# Patient Record
Sex: FEMALE | Race: WHITE | Employment: FULL TIME | URBAN - METROPOLITAN AREA
[De-identification: names, ages, dates, MRNs, and addresses within clinical notes are randomized per-mention and may not be internally consistent; named-entity substitution may affect disease eponyms.]

---

## 2017-01-16 ENCOUNTER — OFFICE VISIT (OUTPATIENT)
Dept: ENDOCRINOLOGY | Age: 52
End: 2017-01-16

## 2017-01-16 VITALS
HEIGHT: 70 IN | OXYGEN SATURATION: 97 % | DIASTOLIC BLOOD PRESSURE: 60 MMHG | SYSTOLIC BLOOD PRESSURE: 101 MMHG | BODY MASS INDEX: 23.91 KG/M2 | HEART RATE: 83 BPM | RESPIRATION RATE: 16 BRPM | WEIGHT: 167 LBS

## 2017-01-16 DIAGNOSIS — E03.4 HYPOTHYROIDISM DUE TO ACQUIRED ATROPHY OF THYROID: Primary | ICD-10-CM

## 2017-01-16 DIAGNOSIS — R73.03 PREDIABETES: ICD-10-CM

## 2017-01-16 NOTE — PROGRESS NOTES
Endocrinology Visit    CC: hypothyroidism    History of present illness:  Patient is a pleasant 46 y.o. female here for f/u of hypothyroidism and prediabetes. She was previously followed by endocrinologist Dr Jona Lee, last visit was in August 2016. In the interim, she switched from Murray 60mg daily to Levoxyl 88 mcg daily at the end of September. She also reports developing hot flashes over the holidays. Labs done by her PCP last week were notable for [facility] of 49.9, LH of 21.2, estrogen of 73.5, prolactin of 14.5, CMP all within normal limits, and negative FARTUN screen. Reports a recent diagnosis of papillary thyroid cancer in her brother. She has not had a formal thyroid US although other radiology techs have practiced on her and said she had nodules. She was diagnosed with hypothyroidism over 15 years ago. She was initially treated with levothyroxine but later switched to Murray thyroid because it was more natural. She currently takes brand name Levoxyl (levothyroxine) 88 mcg daily. She  takes this correctly on an empty stomach in the morning. Since switching back to levothyroxine she reports feeling much better - hair loss seems to have resolved and her energy level is better. She lost about 10 lbs last year, was down to 157 lbs, then regained some weight over the holidays. She denies racing heart, tremor, palpitations, heat or cold intolerance, constipation, diarrhea or energy changes. Last thyroid labs were done in March 2016: TSH was 1.7. Levels have not been checked since she switched thyroid medications.      She is s/p DIANNA. Reports hot flashes that started two months ago, worse at night. Improved after starting black cohosh. Tried estrogen cream and vaginal pill but was unable to tolerate both. Previous evaluations were not consistent with menopause - [facility] checked in August 2016 was nonelevated, however most recent [facility] as noted above was elevated in the menopause range.  She also has a history of one mildly elevated prolactin level of 28 but repeat level checked by Dr Reva Thorpe was normal at 6.     Weight is increased as above. She also reports more swelling in lower legs and feet, worse after standing at work all day. Somewhat improved with compression socks. She also has a history of prediabetes. Most recent labs showed A1c at 5.6% in July 2016. She does have a family history of diabetes. She has tried to reduce her bread intake. Only drinks water, eliminated sodas. Feels she ate too many sweets over the holidays. ROS: see HPI for pertinent positives and negatives, otherwise a 10 system review is negative    Problem list:  Patient Active Problem List   Diagnosis Code    Hypothyroidism E03.9    Allergic rhinitis J30.9    Microscopic hematuria R31.29    Dyslipidemia E78.5    Dysuria R30.0       Medical history:  Past Medical History   Diagnosis Date    Asthma      hx of asthma r/t allergies/emotional response    Hx of mammogram 10/2/12    Nephrolithiasis      right kidney stone    Other ill-defined conditions(799.89)      echocardiogram,12/2010,  has murmur       Past surgical history:  Past Surgical History   Procedure Laterality Date    Pr abdomen surgery proc unlisted       laparoscopy to check for endometriosis    Hx hysterectomy  2007     laparoscopic hysterecomy, rectocele repair, umbilical hernia repair, sling    Hx bunionectomy  2001     ,right bunion    Hx heent       tonsillectomy age 8 year    Hx urological  2007     urethral sling    Hx hernia repair       umbilical       Medications:    Current Outpatient Prescriptions:     LEVOXYL 88 mcg tablet, Take 1 Tab by mouth Daily (before breakfast). BRAND MEDICALLY NECESSARY, Disp: 30 Tab, Rfl: 10    Biotin 2,500 mcg cap, Take 1,500 mcg by mouth., Disp: , Rfl:     Juanita Garcia,Min-FA-Herbal No.157 400 mcg tab, Take  by mouth., Disp: , Rfl:     albuterol (PROVENTIL HFA, VENTOLIN HFA, PROAIR HFA) 90 mcg/actuation inhaler, Take  by inhalation. , Disp: , Rfl:     cholecalciferol, vitamin D3, 2,000 unit tab, Take 4,000 Units by mouth daily. , Disp: , Rfl:     magnesium oxide (MAG-OX) 400 mg tablet, Take 400 mg by mouth daily. , Disp: , Rfl:     VITAMIN E, DL,TOCOPHERYL ACET, (VITAMIN E ACETATE) 400 unit cap capsule, Take  by mouth daily. , Disp: , Rfl:     Allergies: Allergies   Allergen Reactions    Pcn [Penicillins] Nausea Only     Orally only,can take intravenously       Social History:  Social History     Social History    Marital status:      Spouse name:     Number of children: 3    Years of education: N/A     Occupational History    Mammographer-Kahlil Gonzalez     Social History Main Topics    Smoking status: Former Smoker     Packs/day: 1.00     Years: 10.00     Types: Cigarettes     Quit date: 7/13/1989    Smokeless tobacco: Never Used    Alcohol use 1.0 oz/week     2 Glasses of wine per week      Comment: occasional    Drug use: No    Sexual activity: Not Currently     Other Topics Concern    Exercise No     runs regularly     Social History Narrative    One son, two daughters       Family History:  Family History   Problem Relation Age of Onset    Thyroid Disease Father     Cancer Father      CML    Thyroid Disease Brother     Heart Disease Maternal Grandfather     Heart Disease Paternal Grandfather        Physical Exam:  Visit Vitals    /60    Pulse 83    Resp 16    Ht 5' 9.5\" (1.765 m)    Wt 167 lb (75.8 kg)    SpO2 97%    BMI 24.31 kg/m2     Gen: NAD  Heent: mucous membranes moist, no lid lag, stare or exophthalmos. No scleral or conjunctival injection. Extra ocular muscles intact . Thyroid: moves well with swallowing, normal size, normal texture, no masses appreciated  Heme/lymph: no cervical, supraclavicular or submandibular lymphadenopathy is appreciated.   Pulmonary: clear to auscultation bilaterally, no wheezes/rhonchi or rales  Cardiovascular: regular rate and rhythm, no murmurs, rubs or gallops, good distal pulses  Extremities: no clubbing, cyanosis or edema. No onocholysis   Neuro: no tremor of outstretched hands, reflexes are normal with normal relaxation phase. Normal gait, normal concentration  Skin: normal texture, warm and dry   Psyche: normal affect with good insight into her medical conditions    Pertinent lab review:    Lab Results   Component Value Date/Time    TSH 1.700 03/11/2016 08:01 AM    T4, Free 0.99 11/15/2012 01:01 PM     Lab Results   Component Value Date/Time    Hemoglobin A1c 5.8 03/11/2016 08:01 AM    Hemoglobin A1c, External 5.9 06/04/2015 10:41 AM     Labs transcribed in Dr Eli Lopez previous note:    7/2016 health screening labs  Fasting glucose 90, A1c 5.6  Total chol 192, HDL 39.5, ,   TSH 1.49 in 6/2016 8/19/2016 labs  Prolactin 28  DHEA-S - low at 28  Testosterone - low at < 3  ALT 37    8/31/2016 labs  Prolactin 6, LH 7.2, FSH 9.5, estradiol 294 - all within normal range    Assessment and plan:  Patient is a pleasant 46y.o. year old here for hypothyroidism and prediabetes. 1. Hypothyroidism:  Etiology unknown, suspect due to Hashimoto's - check thyroid antibodies today. She clinically appears euthyroid by exam on current dose of levothyroxine 88 mcg daily. She is taking her thyroid medication correctly. To further assess thyroid hormone economy, we will check a TSH, Free T4, and Free T3 today. Will adjust medication if needed to achieve TSH~ 1-2. Sx have improved after switching from [place] to levothyroxine - continue to monitor and consider checking ferritin at next visit if fatigue persists. Given her newly discovered family history of PTC, obtain thyroid US as well. 2. Prediabetes: A1c 5.3 yo 5.8% last year, will update today. Also discussed dietary carbohydrate reduction at length.       I spent 40 minutes with the patient today and > 50% of the time was spent counseling the patient about hypothyroidism: its etiology, clinical manifestations, diagnosis, and management. She will return in 3 months time. Thank you for the opportunity to partake in this patients care.   Isabel Ogden MD  [facility]  [facility]

## 2017-01-16 NOTE — PATIENT INSTRUCTIONS
I recommend researching a low-carbohydrate diet as this way of eating can help improve your blood sugars and can also help maintain a normal weight. Here are some resources you can use to educate yourself on the diet:  1. Diet Doctor Website:   [email or web address]   [email or web address]  2. A Low Carbohydrate, Ketogenic Diet Manual by Genet Dunne  3. New Atkins for a New You by Genet Dunne and Cyndy Vazquez  4. [email or web address]/blog/7-steps-to-healthy-low-carb-living/    ==================================================================     AIM FOR LESS THAN 20-30 GRAMS OF NET CARBS PER MEAL  Net carbs = total carbs (g) - fiber (g)  Read food labels! Avoid food with added sugar or anything with more than 5g sugar per serving. Focus on eating mostly protein (meat, poultry, fish, shellfish, eggs), healthy fats (avocados, nuts, cheese, olive or coconut oil) and non-starchy vegetables (greens, carrots, tomatoes, bell peppers, broccoli, brussels sprouts, green beans, etc). If you fill yourself up with these foods, you won't even want the carbs. Minimize your fruit intake as much as possible, no more than one serving per day.  When you do eat fruit, choose lower sugar options like fresh berries.     BREAKFAST: whole eggs, veggies, omelet, plain yogurt, watson, sausage   LUNCH: salad with meat/poultry, veggies, cheese, nuts; low carb wrap with veggies and meat  DINNER: any type of meat/poultry or seafood (without breading), non-starchy vegetables     GOOD LOW-CARB SNACK OPTIONS: string cheese, Babybel cheese, carrots and celery with Ranch dressing, nuts (almonds, pistachios, walnuts, etc), meat (snack size salami, ham, or turkey)    BEVERAGES: water, water, water  Other options: unsweet tea (okay to add a pack of Splenda if needed), sparkling water without calories (ex: [place], Andrew, etc), coffee (unsweeted creamer is fine)  Alcohol: (always in moderation) - lower carb options include red or white wine and champagne (the drier, the better); light beers like Michelob Ultra; some liquors (just avoid the sweet mixers like juices and sodas)

## 2017-01-16 NOTE — MR AVS SNAPSHOT
Visit Information Date & Time Provider Department Dept. Phone Encounter #  
 1/16/2017  2:50 PM Zeny Mae, [address] [place] [address] Diabetes and Endocrinology [phone] [id number] Follow-up Instructions Return in about 3 months (around 4/16/2017). Upcoming Health Maintenance Date Due Hepatitis C Screening 1965 Pneumococcal 19-64 Medium Risk (1 of 1 - PPSV23) 3/30/1982 PAP AKA CERVICAL CYTOLOGY 3/30/1984 FOBT Q 1 YEAR AGE 50-75 3/30/2013 INFLUENZA AGE 9 TO ADULT 8/1/2016 BREAST CANCER SCRN MAMMOGRAM 3/1/2018 DTaP/Tdap/Td series (2 - Td) 11/28/2026 Allergies as of 1/16/2017  Review Complete On: 1/16/2017 By: Zeny Mae MD  
  
 Severity Noted Reaction Type Reactions Pcn [Penicillins] Low 07/13/2011   Side Effect Nausea Only Orally only,can take intravenously Current Immunizations  Reviewed on 11/28/2016 Name Date Td 6/1/2011 Tdap 11/28/2016 Not reviewed this visit You Were Diagnosed With   
  
 Codes Comments Hypothyroidism due to acquired atrophy of thyroid    -  Primary ICD-10-CM: E03.4 ICD-9-CM: 244.8, 246.8 Prediabetes     ICD-10-CM: R73.03 
ICD-9-CM: 790.29 Vitals BP Pulse Resp Height(growth percentile) Weight(growth percentile) SpO2  
 101/60 83 16 5' 9.5\" (1.765 m) 167 lb (75.8 kg) 97% BMI OB Status Smoking Status 24.31 kg/m2 Hysterectomy Former Smoker Vitals History BMI and BSA Data Body Mass Index Body Surface Area  
 24.31 kg/m 2 1.93 m 2 Preferred Pharmacy Pharmacy Name Phone [id number] [place] [phone] [facility] [phone] Your Updated Medication List  
  
   
This list is accurate as of: 1/16/17  3:37 PM.  Always use your most recent med list.  
  
  
  
  
 albuterol 90 mcg/actuation inhaler Commonly known as:  PROVENTIL HFA, VENTOLIN HFA, PROAIR HFA Take  by inhalation. Biotin 2,500 mcg Cap Take 1,500 mcg by mouth. cholecalciferol (vitamin D3) 2,000 unit Tab Take 4,000 Units by mouth daily. LEVOXYL 88 mcg tablet Generic drug:  levothyroxine Take 1 Tab by mouth Daily (before breakfast). BRAND MEDICALLY NECESSARY  
  
 magnesium oxide 400 mg tablet Commonly known as:  MAG-OX Take 400 mg by mouth daily. Mv,Ca,Min-FA-Herbal No.157 400 mcg Tab Take  by mouth.  
  
 vitamin E acetate 400 unit Cap capsule Take  by mouth daily. We Performed the Following HEMOGLOBIN A1C WITH EAG [34648 CPT(R)] T3, FREE [id number] CPT(R)] THYROID ANTIBODY PANEL [93743 CPT(R)] TSH AND FREE T4 [10709 CPT(R)] Follow-up Instructions Return in about 3 months (around 4/16/2017). To-Do List   
 Around 01/16/2017 Imaging:  US THYROID/PARATHYROID/SOFT TISS Patient Instructions I recommend researching a low-carbohydrate diet as this way of eating can help improve your blood sugars and can also help maintain a normal weight. Here are some resources you can use to educate yourself on the diet: 1. Diet Doctor Website: 
 Home Comfort [email or web address] 
 https://Atlassian.org/ 2. A Low Carbohydrate, Ketogenic Diet Manual by Nick Dao 3. New Atkins for a New You by Nick Dao and Kevin Smith 4. http://luma-id.Outright/blog/7-steps-to-healthy-low-carb-living/ 
 
================================================================== 
  
AIM FOR LESS THAN 20-30 GRAMS OF NET CARBS PER MEAL Net carbs = total carbs (g) - fiber (g) Read food labels! Avoid food with added sugar or anything with more than 5g sugar per serving. Focus on eating mostly protein (meat, poultry, fish, shellfish, eggs), healthy fats (avocados, nuts, cheese, olive or coconut oil) and non-starchy vegetables (greens, carrots, tomatoes, bell peppers, broccoli, brussels sprouts, green beans, etc).  If you fill yourself up with these foods, you won't even want the carbs. Minimize your fruit intake as much as possible, no more than one serving per day. When you do eat fruit, choose lower sugar options like fresh berries. 
  
BREAKFAST: whole eggs, veggies, omelet, plain yogurt, watson, sausage LUNCH: salad with meat/poultry, veggies, cheese, nuts; low carb wrap with veggies and meat DINNER: any type of meat/poultry or seafood (without breading), non-starchy vegetables 
  
GOOD LOW-CARB SNACK OPTIONS: string cheese, Babybel cheese, carrots and celery with Ranch dressing, nuts (almonds, pistachios, walnuts, etc), meat (snack size salami, ham, or turkey) BEVERAGES: water, water, water Other options: unsweet tea (okay to add a pack of Splenda if needed), sparkling water without calories (ex: [place], Gian Snyder, etc), coffee (unsweeted creamer is fine) Alcohol: (always in moderation) - lower carb options include red or white wine and champagne (the drier, the better); light beers like Michelob Ultra; some liquors (just avoid the sweet mixers like juices and sodas) Introducing [facility]! Dear Dejuan Duarte: Thank you for requesting a [employer] account. Our records indicate that you already have an active [employer] account. You can access your account anytime at [email or web address] Did you know that you can access your hospital and ER discharge instructions at any time in [employer]? You can also review all of your test results from your hospital stay or ER visit. Additional Information If you have questions, please visit the Frequently Asked Questions section of the [employer] website at [email or web address] Remember, [employer] is NOT to be used for urgent needs. For medical emergencies, dial 911. Now available from your iPhone and Android! Please provide this summary of care documentation to your next provider. Your primary care clinician is listed as [facility].  If you have any questions after today's visit, please call [phone].

## 2017-01-20 ENCOUNTER — HOSPITAL ENCOUNTER (OUTPATIENT)
Dept: ULTRASOUND IMAGING | Age: 52
Discharge: HOME | End: 2017-01-20
Attending: INTERNAL MEDICINE
Payer: COMMERCIAL

## 2017-01-20 DIAGNOSIS — E03.4 HYPOTHYROIDISM DUE TO ACQUIRED ATROPHY OF THYROID: ICD-10-CM

## 2017-01-20 PROCEDURE — 76536 US EXAM OF HEAD AND NECK: CPT

## 2017-01-23 DIAGNOSIS — E04.1 THYROID NODULE: Primary | ICD-10-CM

## 2017-02-03 RX ORDER — BLACK COHOSH ROOT 540 MG
CAPSULE ORAL
COMMUNITY
End: 2018-02-06

## 2017-02-03 NOTE — PERIOP NOTES
[facility]  Preoperative Instructions        Surgery Date 2/9/17          Time of Arrival 0845    1. On the day of your surgery, please report to the Surgical Services Registration Desk and sign in at your designated time. The Surgery Center is located to the right of the Emergency Room. 2. You must have someone with you to drive you home. You should not drive a car for 24 hours following surgery. Please make arrangements for a friend or family member to stay with you for the first 24 hours after your surgery. 3. Do not have anything to eat or drink (including water, gum, mints, coffee, juice) after midnight 2/8/17              . This may not apply to medications prescribed by your physician. Please note special instructions, if applicable. If you are currently taking Plavix, Coumadin, or other blood-thinning agents, contact your surgeon for instructions. 4. We recommend you do not drink any alcoholic beverages for 24 hours before and after your surgery. 5. Have a list of all current medications, including vitamins, herbal supplements and any other over the counter medications. Stop all Aspirin and non-steroidal anti-inflammatory drugs (I.e. Advil, Aleve), as directed by your surgeon's office. Stop all vitamins and herbal supplements seven days prior to your surgery. 6. Wear comfortable clothes. Wear glasses instead of contacts. Do not bring any money or jewelry. Please bring picture ID, insurance card, and any prearranged co-payment or hospital payment. Do not wear make-up, particularly mascara the morning of your surgery. Do not wear nail polish, particularly if you are having foot /hand surgery. Wear your hair loose or down, no ponytails, buns, martha pins or clips. All body piercings must be removed.   Please shower with antibacterial soap for three consecutive days before and on the morning of surgery, but do not apply any lotions, powders or deodorants after the shower on the day of surgery. Please use a fresh towels after each shower. Please sleep in clean clothes and change bed linens the night before surgery. Please do not shave for 48 hours prior to surgery. Shaving of the face is acceptable. 7. You should understand that if you do not follow these instructions your surgery may be cancelled. If your physical condition changes (I.e. fever, cold or flu) please contact your surgeon as soon as possible. 8. It is important that you be on time. If a situation occurs where you may be late, please call [phone] (OR Holding Area). 9. If you have any questions and or problems, please call [phone] (Pre-admission Testing). 10. Your surgery time may be subject to change. You will receive a phone call the evening prior if your time changes. 11.  If having outpatient surgery, you must have someone to drive you here, stay with you during the duration of your stay, and to drive you home at time of discharge. Special Instructions:    MEDICATIONS TO TAKE THE MORNING OF SURGERY WITH A SIP OF WATER:may take Levoxyl      I understand a pre-operative phone call will be made to verify my surgery time. In the event that I am not available, I give permission for a message to be left on my answering service and/or with another person?   Yes @ [phone]         ___________________      __________   _________    (Signature of Patient)             (Witness)                (Date and Time)

## 2017-02-08 ENCOUNTER — ANESTHESIA EVENT (OUTPATIENT)
Dept: SURGERY | Age: 52
End: 2017-02-08
Payer: COMMERCIAL

## 2017-02-08 RX ORDER — CEFAZOLIN SODIUM IN 0.9 % NACL 2 G/100 ML
2 PLASTIC BAG, INJECTION (ML) INTRAVENOUS
Status: COMPLETED | OUTPATIENT
Start: 2017-02-08 | End: 2017-02-09

## 2017-02-09 ENCOUNTER — ANESTHESIA (OUTPATIENT)
Dept: SURGERY | Age: 52
End: 2017-02-09
Payer: COMMERCIAL

## 2017-02-09 ENCOUNTER — HOSPITAL ENCOUNTER (OUTPATIENT)
Age: 52
Setting detail: OUTPATIENT SURGERY
Discharge: HOME | End: 2017-02-09
Attending: ORTHOPAEDIC SURGERY | Admitting: ORTHOPAEDIC SURGERY
Payer: COMMERCIAL

## 2017-02-09 VITALS
HEIGHT: 69 IN | DIASTOLIC BLOOD PRESSURE: 68 MMHG | OXYGEN SATURATION: 100 % | HEART RATE: 61 BPM | WEIGHT: 163 LBS | SYSTOLIC BLOOD PRESSURE: 105 MMHG | TEMPERATURE: 97.5 F | RESPIRATION RATE: 14 BRPM | BODY MASS INDEX: 24.14 KG/M2

## 2017-02-09 PROCEDURE — 74011250636 HC RX REV CODE- 250/636: Performed by: ORTHOPAEDIC SURGERY

## 2017-02-09 PROCEDURE — 74011250636 HC RX REV CODE- 250/636

## 2017-02-09 PROCEDURE — 88304 TISSUE EXAM BY PATHOLOGIST: CPT | Performed by: ORTHOPAEDIC SURGERY

## 2017-02-09 PROCEDURE — 77030031139 HC SUT VCRL2 J&J -A: Performed by: ORTHOPAEDIC SURGERY

## 2017-02-09 PROCEDURE — 77030018836 HC SOL IRR NACL ICUM -A: Performed by: ORTHOPAEDIC SURGERY

## 2017-02-09 PROCEDURE — 77030020255 HC SOL INJ LR 1000ML BG: Performed by: ORTHOPAEDIC SURGERY

## 2017-02-09 PROCEDURE — 76030000000 HC AMB SURG OR TIME 0.5 TO 1: Performed by: ORTHOPAEDIC SURGERY

## 2017-02-09 PROCEDURE — 74011000250 HC RX REV CODE- 250: Performed by: ORTHOPAEDIC SURGERY

## 2017-02-09 PROCEDURE — 76210000040 HC AMBSU PH I REC FIRST 0.5 HR: Performed by: ORTHOPAEDIC SURGERY

## 2017-02-09 PROCEDURE — 74011000250 HC RX REV CODE- 250

## 2017-02-09 PROCEDURE — 76210000046 HC AMBSU PH II REC FIRST 0.5 HR: Performed by: ORTHOPAEDIC SURGERY

## 2017-02-09 PROCEDURE — 77030011640 HC PAD GRND REM COVD -A: Performed by: ORTHOPAEDIC SURGERY

## 2017-02-09 PROCEDURE — 77030002916 HC SUT ETHLN J&J -A: Performed by: ORTHOPAEDIC SURGERY

## 2017-02-09 PROCEDURE — 76060000061 HC AMB SURG ANES 0.5 TO 1 HR: Performed by: ORTHOPAEDIC SURGERY

## 2017-02-09 RX ORDER — SODIUM CHLORIDE 0.9 % (FLUSH) 0.9 %
5-10 SYRINGE (ML) INJECTION AS NEEDED
Status: DISCONTINUED | OUTPATIENT
Start: 2017-02-09 | End: 2017-02-09 | Stop reason: HOSPADM

## 2017-02-09 RX ORDER — LIDOCAINE HYDROCHLORIDE 10 MG/ML
20 INJECTION, SOLUTION EPIDURAL; INFILTRATION; INTRACAUDAL; PERINEURAL ONCE
Status: COMPLETED | OUTPATIENT
Start: 2017-02-09 | End: 2017-02-09

## 2017-02-09 RX ORDER — DIPHENHYDRAMINE HYDROCHLORIDE 50 MG/ML
12.5 INJECTION, SOLUTION INTRAMUSCULAR; INTRAVENOUS AS NEEDED
Status: DISCONTINUED | OUTPATIENT
Start: 2017-02-09 | End: 2017-02-09 | Stop reason: HOSPADM

## 2017-02-09 RX ORDER — CEFAZOLIN SODIUM IN 0.9 % NACL 2 G/100 ML
PLASTIC BAG, INJECTION (ML) INTRAVENOUS
Status: DISCONTINUED
Start: 2017-02-09 | End: 2017-02-09 | Stop reason: HOSPADM

## 2017-02-09 RX ORDER — LIDOCAINE HYDROCHLORIDE 10 MG/ML
0.1 INJECTION, SOLUTION EPIDURAL; INFILTRATION; INTRACAUDAL; PERINEURAL AS NEEDED
Status: DISCONTINUED | OUTPATIENT
Start: 2017-02-09 | End: 2017-02-09 | Stop reason: HOSPADM

## 2017-02-09 RX ORDER — HYDROMORPHONE HYDROCHLORIDE 1 MG/ML
0.2 INJECTION, SOLUTION INTRAMUSCULAR; INTRAVENOUS; SUBCUTANEOUS
Status: DISCONTINUED | OUTPATIENT
Start: 2017-02-09 | End: 2017-02-09 | Stop reason: HOSPADM

## 2017-02-09 RX ORDER — LIDOCAINE HYDROCHLORIDE 20 MG/ML
INJECTION, SOLUTION EPIDURAL; INFILTRATION; INTRACAUDAL; PERINEURAL AS NEEDED
Status: DISCONTINUED | OUTPATIENT
Start: 2017-02-09 | End: 2017-02-09 | Stop reason: HOSPADM

## 2017-02-09 RX ORDER — FENTANYL CITRATE 50 UG/ML
25 INJECTION, SOLUTION INTRAMUSCULAR; INTRAVENOUS
Status: DISCONTINUED | OUTPATIENT
Start: 2017-02-09 | End: 2017-02-09 | Stop reason: HOSPADM

## 2017-02-09 RX ORDER — PROPOFOL 10 MG/ML
INJECTION, EMULSION INTRAVENOUS
Status: DISCONTINUED | OUTPATIENT
Start: 2017-02-09 | End: 2017-02-09 | Stop reason: HOSPADM

## 2017-02-09 RX ORDER — FENTANYL CITRATE 50 UG/ML
INJECTION, SOLUTION INTRAMUSCULAR; INTRAVENOUS AS NEEDED
Status: DISCONTINUED | OUTPATIENT
Start: 2017-02-09 | End: 2017-02-09 | Stop reason: HOSPADM

## 2017-02-09 RX ORDER — SODIUM CHLORIDE, SODIUM LACTATE, POTASSIUM CHLORIDE, CALCIUM CHLORIDE 600; 310; 30; 20 MG/100ML; MG/100ML; MG/100ML; MG/100ML
25 INJECTION, SOLUTION INTRAVENOUS CONTINUOUS
Status: DISCONTINUED | OUTPATIENT
Start: 2017-02-09 | End: 2017-02-09 | Stop reason: HOSPADM

## 2017-02-09 RX ORDER — PROPOFOL 10 MG/ML
INJECTION, EMULSION INTRAVENOUS AS NEEDED
Status: DISCONTINUED | OUTPATIENT
Start: 2017-02-09 | End: 2017-02-09 | Stop reason: HOSPADM

## 2017-02-09 RX ORDER — OXYCODONE HYDROCHLORIDE 5 MG/1
5 TABLET ORAL
Qty: 40 TAB | Refills: 0 | Status: SHIPPED | OUTPATIENT
Start: 2017-02-09 | End: 2018-02-06

## 2017-02-09 RX ORDER — SODIUM CHLORIDE, SODIUM LACTATE, POTASSIUM CHLORIDE, CALCIUM CHLORIDE 600; 310; 30; 20 MG/100ML; MG/100ML; MG/100ML; MG/100ML
INJECTION, SOLUTION INTRAVENOUS
Status: DISCONTINUED | OUTPATIENT
Start: 2017-02-09 | End: 2017-02-09 | Stop reason: HOSPADM

## 2017-02-09 RX ORDER — SODIUM CHLORIDE 0.9 % (FLUSH) 0.9 %
5-10 SYRINGE (ML) INJECTION EVERY 8 HOURS
Status: DISCONTINUED | OUTPATIENT
Start: 2017-02-09 | End: 2017-02-09 | Stop reason: HOSPADM

## 2017-02-09 RX ORDER — MIDAZOLAM HYDROCHLORIDE 1 MG/ML
INJECTION, SOLUTION INTRAMUSCULAR; INTRAVENOUS AS NEEDED
Status: DISCONTINUED | OUTPATIENT
Start: 2017-02-09 | End: 2017-02-09 | Stop reason: HOSPADM

## 2017-02-09 RX ADMIN — PROPOFOL 140 MCG/KG/MIN: 10 INJECTION, EMULSION INTRAVENOUS at 11:20

## 2017-02-09 RX ADMIN — PROPOFOL 150 MG: 10 INJECTION, EMULSION INTRAVENOUS at 11:15

## 2017-02-09 RX ADMIN — MIDAZOLAM HYDROCHLORIDE 2 MG: 1 INJECTION, SOLUTION INTRAMUSCULAR; INTRAVENOUS at 11:10

## 2017-02-09 RX ADMIN — LIDOCAINE HYDROCHLORIDE 40 MG: 20 INJECTION, SOLUTION EPIDURAL; INFILTRATION; INTRACAUDAL; PERINEURAL at 11:15

## 2017-02-09 RX ADMIN — SODIUM CHLORIDE, SODIUM LACTATE, POTASSIUM CHLORIDE, CALCIUM CHLORIDE: 600; 310; 30; 20 INJECTION, SOLUTION INTRAVENOUS at 11:04

## 2017-02-09 RX ADMIN — FENTANYL CITRATE 50 MCG: 50 INJECTION, SOLUTION INTRAMUSCULAR; INTRAVENOUS at 11:15

## 2017-02-09 RX ADMIN — CEFAZOLIN 2 G: 10 INJECTION, POWDER, FOR SOLUTION INTRAVENOUS; PARENTERAL at 11:10

## 2017-02-09 NOTE — IP AVS SNAPSHOT
[address] [facility] 
[phone] Patient: Mp Cartwright MRN: [id number] ORV:9/81/[id number] You are allergic to the following Allergen Reactions Pcn (Penicillins) Nausea Only Orally only,can take intravenously Recent Documentation Height Weight BMI OB Status Smoking Status 1.753 m 73.9 kg 24.07 kg/m2 Hysterectomy Former Smoker Emergency Contacts Name Discharge Info Relation Home Work Mobile Jenny Fletcher DISCHARGE CAREGIVER [3] Child [2] [phone] Kb Cruz  Other Relative [6]   [phone] About your hospitalization You were admitted on:  February 9, 2017 You last received care in the:  [facility] PACU You were discharged on:  February 9, 2017 Unit phone number:  [phone] Why you were hospitalized Your primary diagnosis was:  Not on File Providers Seen During Your Hospitalizations Provider Role Specialty Primary office phone Alin Calderon MD Attending Provider Orthopedic Surgery [phone] Your Primary Care Physician (PCP) Primary Care Physician Office Phone Office Fax Brandi Hidden [phone] Follow-up Information Follow up With Details Comments Contact Info Jinny Mcghee MD   [place] [address] [address] [facility] 
[phone] Alin Calderon MD In 2 weeks For suture removal [address] [place] [address] [address] [facility] 
[phone] Current Discharge Medication List  
  
START taking these medications Dose & Instructions Dispensing Information Comments Morning Noon Evening Bedtime  
 oxyCODONE IR 5 mg immediate release tablet Commonly known as:  Florentino Fuelling Your next dose is: Today, Tomorrow Other:  _________ Dose:  5 mg Take 1 Tab by mouth every four (4) hours as needed for Pain. Max Daily Amount: 30 mg.  
 Quantity:  40 Tab Refills:  0 CONTINUE these medications which have CHANGED Dose & Instructions Dispensing Information Comments Morning Noon Evening Bedtime LEVOXYL 88 mcg tablet Generic drug:  levothyroxine What changed:  additional instructions Your next dose is: Today, Tomorrow Other:  _________ Dose:  88 mcg Take 1 Tab by mouth Daily (before breakfast). BRAND MEDICALLY NECESSARY Quantity:  30 Tab Refills:  10  
     
   
   
   
  
  
CONTINUE these medications which have NOT CHANGED Dose & Instructions Dispensing Information Comments Morning Noon Evening Bedtime Biotin 2,500 mcg Cap Your next dose is: Today, Tomorrow Other:  _________ Dose:  1500 mcg Take 1,500 mcg by mouth. Refills:  0  
     
   
   
   
  
 black cohosh 540 mg Cap Your next dose is: Today, Tomorrow Other:  _________ Take  by mouth. Refills:  0  
     
   
   
   
  
 cholecalciferol (vitamin D3) 2,000 unit Tab Your next dose is: Today, Tomorrow Other:  _________ Dose:  2000 Units Take 2,000 Units by mouth daily. Refills:  0  
     
   
   
   
  
 magnesium oxide 400 mg tablet Commonly known as:  MAG-OX Your next dose is: Today, Tomorrow Other:  _________ Dose:  400 mg Take 400 mg by mouth daily. Refills:  0 Mv,Ca,Min-FA-Herbal No.157 400 mcg Tab Your next dose is: Today, Tomorrow Other:  _________ Take  by mouth. Refills:  0  
     
   
   
   
  
 TURMERIC ROOT EXTRACT PO Your next dose is: Today, Tomorrow Other:  _________ Take  by mouth. Refills:  0  
     
   
   
   
  
 vitamin E acetate 400 unit Cap capsule Your next dose is: Today, Tomorrow Other:  _________ Take  by mouth daily. Refills:  0 Where to Get Your Medications Information on where to get these meds will be given to you by the nurse or doctor. ! Ask your nurse or doctor about these medications  
  oxyCODONE IR 5 mg immediate release tablet Discharge Instructions [facility] Melvina Alvarado MD 
Postoperative Instructions Left Lower Extremity: 
 ___Non Weight Bearing    _x__Postop Shoe 
 ___Heel touch weightbearing               ___CAM Boot 
 _x__Weightbearing as tolerated   ___Splint/Cast 
 
Dressing: 
 _x__Keep Dressing or Splint clean & dry 
 _x__Do Not remove dressing; Dressing will be changed at first postoperative visit. 
 ___Other:  
 
Destiny Guevara: ? You may shower 48 hours after your surgery. Do Not allow dressing or splint to get wet! Diet:  
? Advance diet as tolerated. You may experience nausea due to anesthesia, pain or pain medications. You should avoid spicy or heavy meals initially. Pain Management: 
? If you have received a block, the pain relief can last from 4-24 hours. This also means you may not have sensation or movement in your foot for the same amount of time. ? You will have pain after the block wears off. Anticipate this and start pain medications prior to the block wearing off. 
? Do Not drive while taking narcotic pain medications. Elevation: 
? Strict elevation at or just above the level of your heart for the first 14 days after surgery. Limit time that foot is in dependent position for trips to bathroom and kitchen as much as possible. Early control of swelling will improve future swelling. Ice:  
? __x___ You may ice your surgical extremity for no longer than 20 minutes at a time, 3-4 times per day. Do Not apply ice directly to the skin. ? _____ Do Not apply ice to surgical extremity. Aspirin therapy: None ? You may take NSAIDs (Ibuprofen, Advil, Motrin, or Aleve) as tolerated for pain Call our office at [phone] if the following occur: ? Severe swelling, profuse bleeding or severe pain which does not improve with pain medication. ? Fever greater than 101.0; fevers less than this are very common in the first few days after surgery and are unlikely to indicate infection. Follow up: 2 weeks, call to confirm appointment Additional Instructions: none [address]. THROMBOSIS AND PULMONARY EMBOLUS 
 
SURGICAL PATIENTS Surgical patients are the #1 risk for DVT and PE. WHAT IS DVT? WHAT IS PE? 
DVT is a serious condition where blood clots develop deep in the veins of the legs. PE occurs when a blood clot breaks loose from the wall of a vein and travels to the lungs blocking the pulmonary artery or one of its branches impairing blood flow from the heart, which could result in death. RISK FACTORS 
? Surgery lasting longer than 45 minutes ? History of inflammatory bowel disease 
? Oral contraceptive or hormone replacement therapy ? Immobilization ? Varicose veins / swollen legs ? Smoking  
? CHF / Acute MI / Irregular heart beat ? Family history of thrombosis ? General anesthesia greater than 2 hours ? Obesity ? Infection of less than one month ? Less than 1 month postpartum 
? COPD / Pneumonia ? Arthroscopic surgery ? Malignancy / cancer ? Spine surgery ? Blood abnormalities ? Stroke / Paralysis / Coma SIGNS AND SYMPTOMS OF DEEP VEIN TROMBOSIS Usually occurs in one leg, above or below the knee ? Swelling  one calf or thigh may be larger than the other ? Feeling increased warmth in the area of the leg that is swollen or painful ? Leg pain, which may increase when standing or walking ? Swelling along the vein of the leg ? When swollen areas is pressed with a finger, a depression may remain ? Tenderness of the leg that may be confined to one area ? Change in leg color (bluish or red) SIGNS AND SYMPTOMS OF PULMONARY EMBOLUS 
? Chest pain that gets worse with deep breath, coughing or chest movement ? Coughing up blood ? Sweating ? Shortness of breath or difficulty breathing ? Rapid heart beat ? Lightheadedness HOW TO REDUCE THE POSSIBLE RISK OF DVT ? Exercise  simple activities as rotating ankles and wrists, wiggling toes and fingers, tightening and relaxing muscles in calves and thighs promotes circulation while recovering from surgery. Please do these exercises every hour during waking hours ? STEVE hose  If you have been given white support hose while having surgery, wear them home. You may remove them for half and hour every 8-hour period and stop wearing them 48 hours after surgery or as prescribed by your physician. STEVE hose may be reused for air travel or extended car travel ? Take mediation as prescribed by your physician (Lovenox, Coumadin, Aspirin) ? Resume your normal activities as soon as your doctor advises you to do so. 
? Remember, when traveling, to [place] your legs frequently. Wear non skid shoes when STEVE hose are on. They are very slippery! PATIENTS WHO BELIEVE THEY MAY BE EXPERIENCING SIGNS AND SYMPTOMS OF DVT OR PE SHOULD SEEK MEDICAL HELP IMMEDIATELY 
 
 
 
 DO NOT TAKE TYLENOL/ACETAMINOPHEN WITH PERCOCET, [address], [place] OR V[place]OD[place]. TAKE NARCOTIC PAIN MEDICATIONS WITH FOOD Narcotics tend to be constipating, we suggest taking a stool softener such as Colace or Miralax (follow package instructions). DO NOT DRIVE WHILE TAKING NARCOTIC PAIN MEDICATIONS. DO NOT TAKE SLEEPING MEDICATIONS OR ANTIANXIETY MEDICATIONS WHILE TAKING NARCOTIC PAIN MEDICATIONS,  ESPECIALLY THE NIGHT OF ANESTHESIA. CPAP PATIENTS BE SURE TO WEAR MACHINE WHENEVER NAPPING OR SLEEPING. DISCHARGE SUMMARY from Nurse The following personal items collected during your admission are returned to you: Dental Appliance: Dental Appliances: None Vision: Visual Aid: Glasses (sister in law) Hearing Aid:   
Jewelry: Jewelry: None Clothing: Clothing: With patient Other Valuables: Other Valuables: Eyeglasses (given to family) Valuables sent to safe:   
 
 
PATIENT INSTRUCTIONS: 
 
 
F-face looks uneven A-arms unable to move or move even S-speech slurred or non-existent T-time-call 911 as soon as signs and symptoms begin-DO NOT go  
 Back to bed or wait to see if you get better-TIME IS BRAIN. If you have not received your influenza and/or pneumococcal vaccine, please follow up with your primary care physician. The discharge information has been reviewed with the patient and caregiver. The patient and caregiver verbalized understanding. Discharge Instructions Attachments/References OXYCODONE, SLOW RELEASE (BY MOUTH) (ENGLISH) Discharge Orders None Introducing [address] Dear [employer]: Thank you for requesting a [employer] account. Our records indicate that you already have an active Betterment account. You can access your account anytime at [email or web address] Did you know that you can access your hospital and ER discharge instructions at any time in [employer]? You can also review all of your test results from your hospital stay or ER visit. Additional Information If you have questions, please visit the Frequently Asked Questions section of the Betterment website at [email or web address] Remember, Betterment is NOT to be used for urgent needs. For medical emergencies, dial 911. Now available from your iPhone and Android! General Information Please provide this summary of care documentation to your next provider. Patient Signature:  ____________________________________________________________ Date:  ____________________________________________________________  
  
Leoncio Beatty Provider Signature:  ____________________________________________________________ Date:  ____________________________________________________________ More Information Oxycodone, Slow Release (By mouth) Oxycodone Hydrochloride (pb-f-DSD-done jessi-droe-KLOR-tati) Treats moderate to severe pain. This medicine is a narcotic pain reliever. Brand Name(s):OxyCONTIN There may be other brand names for this medicine. When This Medicine Should Not Be Used: This medicine is not right for everyone. Do not use it if you had an allergic reaction to oxycodone or other narcotic medicine, or if you have a stomach or bowel blockage. How to Use This Medicine:  
Long Acting Tablet · Take your medicine as directed. Your dose may need to be changed several times to find what works best for you. An overdose can be dangerous. Follow directions carefully so you do not get too much medicine at one time. · Swallow the extended-release tablet whole. Do not crush, break, or chew it. Do not take any tablet that is chipped or broken. · Do not soak, lick, or wet the tablet before you place it in your mouth. Take 1 tablet at a time with enough water to swallow it completely. · This medicine should come with a Medication Guide. Ask your pharmacist for a copy if you do not have one. · Missed dose: Take a dose as soon as you remember. If it is almost time for your next dose, wait until then and take a regular dose. Do not take extra medicine to make up for a missed dose. · Store the medicine in a closed container at room temperature, away from heat, moisture, and direct light. Store the medicine in a safe and secure place. Ask your pharmacist about the best way to dispose of medicine you do not use. Drugs and Foods to Avoid: Ask your doctor or pharmacist before using any other medicine, including over-the-counter medicines, vitamins, and herbal products. · Some foods and medicines can affect how oxycodone works. Tell your doctor if you are using any of the following: ¨ Carbamazepine, erythromycin, ketoconazole, phenytoin, rifampin, ritonavir ¨ Diuretic (water pill) ¨ Phenothiazine medicine (including chlorpromazine, perphenazine, promethazine, prochlorperazine, thioridazine) · Tell your doctor if you use anything else that makes you sleepy. Some examples are allergy medicine, narcotic pain medicine, and alcohol.  Tell your doctor if you are also using buprenorphine, butorphanol, nalbuphine, pentazocine, or a muscle relaxer. · Do not drink alcohol while you are using this medicine. Warnings While Using This Medicine: · Tell your doctor if you are pregnant or breastfeeding, or if you have kidney disease, liver disease, breathing problems or lung disease (such as asthma or COPD), low blood pressure, digestion problems, an enlarged prostate, gallbladder disease, pancreas disease, or trouble swallowing or urinating. Tell your doctor if you have a history of head injury, brain tumor, depression, seizures, or alcohol or drug addiction. · This medicine may cause the following problems: 
¨ High risk of overdose, which can lead to death ¨ Respiratory depression (serious breathing problem that can be life-threatening) · This medicine can be habit-forming. Do not use more than your prescribed dose. Call your doctor if you think your medicine is not working. · Do not stop using this medicine suddenly. Your doctor will need to slowly decrease your dose before you stop it completely. · This medicine may make you dizzy, drowsy, or faint. Do not drive or do anything else that could be dangerous until you know how this medicine affects you. Sit or lie down if you feel dizzy. Stand up carefully. · This medicine may cause constipation, especially with long-term use. Ask your doctor if you should use a laxative to prevent and treat constipation. · Keep all medicine out of the reach of children. Never share your medicine with anyone. Possible Side Effects While Using This Medicine:  
Call your doctor right away if you notice any of these side effects: · Allergic reaction: Itching or hives, swelling in your face or hands, swelling or tingling in your mouth or throat, chest tightness, trouble breathing · Blue lips, fingernails, or skin · Extreme dizziness or weakness, shallow breathing, slow or uneven heartbeat, sweating, cold or clammy skin, seizures · Lightheadedness, dizziness, fainting · Severe constipation, stomach pain, or vomiting · Trouble breathing or slow breathing If you notice these less serious side effects, talk with your doctor:  
· Headache · Mild constipation, nausea, or vomiting · Mild sleepiness or tiredness If you notice other side effects that you think are caused by this medicine, tell your doctor. Call your doctor for medical advice about side effects. You may report side effects to FDA at 6-770-BFB-8016 © 2016 [address] is for End User's use only and may not be sold, redistributed or otherwise used for commercial purposes. The above information is an  only. It is not intended as medical advice for individual conditions or treatments. Talk to your doctor, nurse or pharmacist before following any medical regimen to see if it is safe and effective for you.

## 2017-02-09 NOTE — BRIEF OP NOTE
BRIEF OPERATIVE NOTE    Date of Procedure: 2/9/2017   Preoperative Diagnosis: LEFT FOOT GANGLION CYST  Postoperative Diagnosis: LEFT FOOT GANGLION CYST    Procedure(s):  LEFT FOOT GANGLION CYST EXCISION  (LOCAL AND GENERAL)  Surgeon(s) and Role:     * Alin Blanco MD - Primary            Surgical Staff:  Circ-1: Omer Doss RN  Circ-Relief: Zandra Reza RN  Scrub Tech-1: Bill Anderson  Scrub Tech-2: Nikki Piper  Event Time In   Incision Start 1125   Incision Close 1146     Anesthesia: General   Estimated Blood Loss: < 10 cc  Specimens:   ID Type Source Tests Collected by Time Destination   1 : Left Foot Ganglion Cyst Preservative   Alin Blanco MD 2/9/2017 1126 Pathology      Findings: see full note   Complications: none  Implants: * No implants in log *

## 2017-02-09 NOTE — ANESTHESIA POSTPROCEDURE EVALUATION
Post-Anesthesia Evaluation and Assessment    Patient: Marlena Andrade MRN: [id number]  SSN: [id number]    YOB: 1965  Age: 46 y.o. Sex: female       Cardiovascular Function/Vital Signs  Visit Vitals    /68    Pulse 61    Temp 36.4 °C (97.5 °F)    Resp 14    Ht 5' 9\" (1.753 m)    Wt 73.9 kg (163 lb)    SpO2 100%    BMI 24.07 kg/m2       Patient is status post general, total IV anesthesia anesthesia for Procedure(s):  LEFT FOOT GANGLION CYST EXCISION  (LOCAL AND GENERAL). Nausea/Vomiting: None    Postoperative hydration reviewed and adequate. Pain:  Pain Scale 1: Numeric (0 - 10) (02/09/17 1222)  Pain Intensity 1: 0 (02/09/17 1222)   Managed    Neurological Status:   Neuro (WDL): Exceptions to WDL (02/09/17 1153)  Neuro  Neurologic State: Drowsy; Eyes open spontaneously (02/09/17 1153)  LUE Motor Response: Purposeful (02/09/17 1153)  LLE Motor Response: Purposeful (02/09/17 1153)  RUE Motor Response: Purposeful (02/09/17 1153)  RLE Motor Response: Purposeful (02/09/17 1153)   At baseline    Mental Status and Level of Consciousness: Arousable    Pulmonary Status:   O2 Device: Room air (02/09/17 1210)   Adequate oxygenation and airway patent    Complications related to anesthesia: None    Post-anesthesia assessment completed.  No concerns    Signed By: Nia Miranda MD     February 9, 2017

## 2017-02-09 NOTE — H&P
HPI   Chief complaint:  Left foot pain and mass     History of present illness:  52-year-old female with history of left foot pain and mass. She 1st reported it back in 2004. At that time she had a podiatrist aspirated it which caused her significant amount of pain and dysfunction. Eventually got better however and was doing well up until about 1 week ago. She had no new injuries. She reported that it acutely increased in size and increased in pain. She now has problems with shoe wear. She reports that she has always had the mass however since 2004. She reports her pain is currently 1/10, but it is 5/10 with shoe wear. It is worse with running. It is better when she is propped up. She has no fevers or chills. No skin changes. Review of systems:  Positive per HPI, otherwise negative. Smoking status:  Nonsmoker     Physical examination:  NAD, A&O x3  Trachea midline  NRRR  No audible wheezes  Abdomen NT/nondistended     Left Lower Extremity:  SILT DP/SP/MP/LP/Sural/Saphenous  +EHL/FHL/TA/GSC/PL/PB  Palpable DP  CR brisk  There is a palpable firm cystic structure overlying the cc joint region of the left foot. There are no skin changes present. No erythema or drainage. Imaging: Three weight-bearing views of the left foot demonstrates no fractures dislocations. Normal alignment. No degenerative changes. A/P:  Left foot ganglion cyst  - I have offered both  nonoperative and operative treatment for the patient. Nonoperative treatment would include doing nothing, activity modification, shoe modification, and aspiration. The patient has refused aspiration since she has had this in the past and it made her worse for a while and did not resolve her persist.  I also offered surgical excision of the ganglion cyst.  I believe that this is the best way to remove the cyst more permanently. Although I did discuss that ganglion cyst do have a tendency to recur.   The patient would like to pursue surgical excision of the cyst.  - - SURGICAL CONSENTING:  The patient was counseled on operative and non operative management. risks of surgical treatment include but are not limited to bleeding, infection, wound healing delay, nerve injury resulting in numbness or pain, need for revision surgery, blood clots and need for ampuation.   - The patient understands these risks and wishes to proceed. .  Current Meds   Vitamin E CAPS;; RPT  Vitamin D TABS;; RPT  Vitamin C TABS;; RPT  Vitamin B Complex TABS;; RPT  Levoxyl 88 MCG Oral Tablet;; RPT  Black Cohosh 40 MG Oral Capsule;; RPT  Turmeric Powder;; RPT. Allergies   Codeine Derivatives; Itching  Other  Penicillins; Nausea. Active Problems   Left hip pain   (M25.552)  Pain in knee joint  ; Left (M25.569)  Shoulder impingement, right   (M75.41)  Tendinopathy of right rotator cuff   (M67.911). PMH   Asthma (J45.909)  Bladder problem (N32.9)  Low blood pressure (I95.9)  Thyroid disease (E07.9). PSH   Hernia Repair  Oral Surgery Tooth Extraction  Tonsillectomy  Vaginal Hysterectomy. Family Hx   Arthritis: Mother,Sister (M19.90)  Cancer: Father (C80.1)  Heart disease: Other (I51.9). Personal Hx   Alcohol drinker (Z78.9); : 1  Former smoker (P72.431)  Living alone (Z60.2)  Number of children; : 3  Occupation; : Mammographer, bone density tech. Vital Signs    Recorded by [facility],Live on 18 Nov 2016 11:30 AM  Height: 5 ft 9.00 in, Weight: 160 lb 0.00 oz, BMI: 23.6 kg/m2,   BMI Calculated: 23.63 ,   BSA Calculated: 1.88. Signature   Electronically signed by : Rio Stewart MD; 01/23/2017 5:54 PM EST; Author    Date of Surgery Update:  Crow Schwartz was seen and examined. History and physical has been reviewed. The patient has been examined. There have been no significant clinical changes since the completion of the originally dated History and Physical.  Patient identified by surgeon; surgical site was confirmed by patient and surgeon.     Proceed with excision of painful ganglion cyst.    Signed By: Roberth Gore MD     February 9, 2017 10:57 AM

## 2017-02-09 NOTE — PERIOP NOTES
Suha Borjas  1965  [id number]    Situation:  Verbal report given from: LATOYA Vazquez RN and [address] [place] [address] CRNA  Procedure: Procedure(s):  LEFT FOOT GANGLION CYST EXCISION  (LOCAL AND GENERAL)    Background:    Preoperative diagnosis: LEFT FOOT GANGLION CYST    Postoperative diagnosis: LEFT FOOT GANGLION CYST    :  Dr. Toan Arteaga    Assistant(s): Circ-1: Thong Rodriguez RN  Circ-Relief: Janett Figueroa RN  Scrub Tech-1: Zonia Paitno  Scrub Tech-2: Nikki Piper    Specimens:   ID Type Source Tests Collected by Time Destination   1 : Left Foot Ganglion Cyst Preservative   Alin Arteaga MD 2/9/2017 1126 Pathology       Assessment:  Intra-procedure medications         Anesthesia gave intra-procedure sedation and medications, see anesthesia flow sheet     Intravenous fluids: LR@ KVO     Vital signs stable       Recommendation:    Permission to share finding with family or friend yes    [phone] D/C to home via w/c,accompanied to car per RN. No complaints of nausea/pain.

## 2017-02-09 NOTE — DISCHARGE INSTRUCTIONS
Madeline Reyna MD  Postoperative Instructions    Left Lower Extremity:   ___Non Weight Bearing    _x__Postop Shoe   ___Heel touch weightbearing               ___CAM Boot   _x__Weightbearing as tolerated   ___Splint/Cast    Dressing:   _x__Keep Dressing or Splint clean & dry   _x__Do Not remove dressing; Dressing will be changed at first postoperative visit.   ___Other:     Jhonny Shane:   You may shower 48 hours after your surgery. Do Not allow dressing or splint to get wet! Diet:    Advance diet as tolerated. You may experience nausea due to anesthesia, pain or pain medications. You should avoid spicy or heavy meals initially. Pain Management:   If you have received a block, the pain relief can last from 4-24 hours. This also means you may not have sensation or movement in your foot for the same amount of time.  You will have pain after the block wears off. Anticipate this and start pain medications prior to the block wearing off.  Do Not drive while taking narcotic pain medications. Elevation:   Strict elevation at or just above the level of your heart for the first 14 days after surgery. Limit time that foot is in dependent position for trips to bathroom and kitchen as much as possible. Early control of swelling will improve future swelling. Ice:    __x___ Francisco Javier Yap may ice your surgical extremity for no longer than 20 minutes at a time, 3-4 times per day. Do Not apply ice directly to the skin.  _____ Do Not apply ice to surgical extremity. Aspirin therapy: None   You may take NSAIDs (Ibuprofen, Advil, Motrin, or Aleve) as tolerated for pain    Call our office at [phone] if the following occur:   Severe swelling, profuse bleeding or severe pain which does not improve with pain medication.  Fever greater than 101.0; fevers less than this are very common in the first few days after surgery and are unlikely to indicate infection.     Follow up: 2 weeks, call to confirm appointment    Additional Instructions: none  [place] [address] [facility]  DEEP VEIN THROMBOSIS AND PULMONARY EMBOLUS    SURGICAL PATIENTS  Surgical patients are the #1 risk for DVT and PE. WHAT IS DVT? WHAT IS PE?  DVT is a serious condition where blood clots develop deep in the veins of the legs. PE occurs when a blood clot breaks loose from the wall of a vein and travels to the lungs blocking the pulmonary artery or one of its branches impairing blood flow from the heart, which could result in death.   RISK FACTORS   Surgery lasting longer than 45 minutes   History of inflammatory bowel disease   Oral contraceptive or hormone replacement therapy   Immobilization   Varicose veins / swollen legs   Smoking    CHF / Acute MI / Irregular heart beat   Family history of thrombosis   General anesthesia greater than 2 hours   Obesity   Infection of less than one month   Less than 1 month postpartum   COPD / Pneumonia   Arthroscopic surgery   Malignancy / cancer   Spine surgery   Blood abnormalities   Stroke / Paralysis / Coma    SIGNS AND SYMPTOMS OF DEEP VEIN TROMBOSIS  Usually occurs in one leg, above or below the knee   Swelling - one calf or thigh may be larger than the other   Feeling increased warmth in the area of the leg that is swollen or painful   Leg pain, which may increase when standing or walking   Swelling along the vein of the leg   When swollen areas is pressed with a finger, a depression may remain   Tenderness of the leg that may be confined to one area   Change in leg color (bluish or red)    SIGNS AND SYMPTOMS OF PULMONARY EMBOLUS   Chest pain that gets worse with deep breath, coughing or chest movement   Coughing up blood   Sweating   Shortness of breath or difficulty breathing   Rapid heart beat   Lightheadedness    HOW TO REDUCE THE POSSIBLE RISK OF DVT   Exercise - simple activities as rotating ankles and wrists, wiggling toes and fingers, tightening and relaxing muscles in calves and thighs promotes circulation while recovering from surgery. Please do these exercises every hour during waking hours   STEVE hose - If you have been given white support hose while having surgery, wear them home. You may remove them for half and hour every 8-hour period and stop wearing them 48 hours after surgery or as prescribed by your physician. STEVE hose may be reused for air travel or extended car travel   Take mediation as prescribed by your physician (Lovenox, Coumadin, Aspirin)   Resume your normal activities as soon as your doctor advises you to do so.  Remember, when traveling, to [place] your legs frequently. Wear non skid shoes when STEVE hose are on. They are very slippery! PATIENTS WHO BELIEVE THEY MAY BE EXPERIENCING SIGNS AND SYMPTOMS OF DVT OR PE SHOULD SEEK MEDICAL HELP IMMEDIATELY         DO NOT TAKE TYLENOL/ACETAMINOPHEN WITH PERCOCET, [address], [place] OR [place]. TAKE NARCOTIC PAIN MEDICATIONS WITH FOOD   Narcotics tend to be constipating, we suggest taking a stool softener such as Colace or Miralax (follow package instructions). DO NOT DRIVE WHILE TAKING NARCOTIC PAIN MEDICATIONS. DO NOT TAKE SLEEPING MEDICATIONS OR ANTIANXIETY MEDICATIONS WHILE TAKING NARCOTIC PAIN MEDICATIONS,  ESPECIALLY THE NIGHT OF ANESTHESIA. CPAP PATIENTS BE SURE TO WEAR MACHINE WHENEVER NAPPING OR SLEEPING. DISCHARGE SUMMARY from Nurse    The following personal items collected during your admission are returned to you:   Dental Appliance: Dental Appliances: None  Vision: Visual Aid: Glasses (sister in law)  Hearing Aid:    Jewelry: Jewelry: None  Clothing: Clothing: With patient  Other Valuables: Other Valuables: Eyeglasses (given to family)  Valuables sent to safe:        PATIENT INSTRUCTIONS:    After General Anesthesia or Intravenous Sedation, for 24 hours or while taking prescription Narcotics:        Someone should be with you for the next 24 hours.         For your own safety, a responsible adult must drive you home. · Limit your activities  · Recommended activity: Rest today, up with assistance today. Do not climb stairs or shower unattended for the next 24 hours. · Please start with a soft bland diet and advance as tolerated (no nausea) to regular diet. · If you have a sore throat you should try the following: fluids, warm salt water gargles, or throat lozenges. If it does not improve after several days please follow up with your primary physician. · Do not drive and operate hazardous machinery  · Do not make important personal or business decisions  · Do  not drink alcoholic beverages  · If you have not urinated within 8 hours after discharge, please contact your surgeon on call. Report the following to your surgeon:  · Excessive pain, swelling, redness or odor of or around the surgical area  · Temperature over 100.5  · Nausea and vomiting lasting longer than 4 hours or if unable to take medications  · Any signs of decreased circulation or nerve impairment to extremity: change in color, persistent  numbness, tingling, coldness or increase pain      · You will receive a Post Operative Call from one of the Recovery Room Nurses on the day after your surgery to check on you. It is very important for us to know how you are recovering after your surgery. If you have an issue or need to speak with someone, please call your surgeon, do not wait for the post operative call. · You may receive an e-mail or letter in the mail from [employer] regarding your experience with us in the Ambulatory Surgery Unit. Your feedback is valuable to us and we appreciate your participation in the survey. · If the above instructions are not adequate, please contact Emory Brizuela RN, Suzanne anesthesia Nurse Manager or our Anesthesiologist, at [phone]. If you are having problems after your surgery, call the physician at his office number.     · We wish you a speedy recovery ?        What to do at Home:      *  Please give a list of your current medications to your Primary Care Provider. *  Please update this list whenever your medications are discontinued, doses are      changed, or new medications (including over-the-counter products) are added. *  Please carry medication information at all times in case of emergency situations. These are general instructions for a healthy lifestyle:    No smoking/ No tobacco products/ Avoid exposure to second hand smoke    Surgeon General's Warning:  Quitting smoking now greatly reduces serious risk to your health. Obesity, smoking, and sedentary lifestyle greatly increases your risk for illness    A healthy diet, regular physical exercise & weight monitoring are important for maintaining a healthy lifestyle    You may be retaining fluid if you have a history of heart failure or if you experience any of the following symptoms:  Weight gain of 3 pounds or more overnight or 5 pounds in a week, increased swelling in our hands or feet or shortness of breath while lying flat in bed. Please call your doctor as soon as you notice any of these symptoms; do not wait until your next office visit. Recognize signs and symptoms of STROKE:    F-face looks uneven    A-arms unable to move or move even    S-speech slurred or non-existent    T-time-call 911 as soon as signs and symptoms begin-DO NOT go       Back to bed or wait to see if you get better-TIME IS BRAIN. If you have not received your influenza and/or pneumococcal vaccine, please follow up with your primary care physician. The discharge information has been reviewed with the patient and caregiver. The patient and caregiver verbalized understanding.

## 2017-02-09 NOTE — ANESTHESIA PREPROCEDURE EVALUATION
Anesthetic History     PONV     Comments: Emerges with anxiety and agitation/combative     Review of Systems / Medical History  Patient summary reviewed, nursing notes reviewed and pertinent labs reviewed    Pulmonary            Asthma        Neuro/Psych   Within defined limits           Cardiovascular  Within defined limits                Exercise tolerance: >4 METS     GI/Hepatic/Renal  Within defined limits              Endo/Other    Diabetes  Hypothyroidism       Other Findings              Physical Exam    Airway  Mallampati: II  TM Distance: 4 - 6 cm  Neck ROM: normal range of motion   Mouth opening: Normal     Cardiovascular  Regular rate and rhythm,  S1 and S2 normal,  no murmur, click, rub, or gallop             Dental  No notable dental hx       Pulmonary  Breath sounds clear to auscultation               Abdominal  GI exam deferred       Other Findings            Anesthetic Plan    ASA: 2  Anesthesia type: general and total IV anesthesia    Monitoring Plan: BIS      Induction: Intravenous  Anesthetic plan and risks discussed with: Patient

## 2017-02-13 NOTE — OP NOTES
Preoperative Diagnosis:  left lower extremity:  1. Ganglion Cyst, left foot    Postoperative Diagnosis:  Same    Procedure:  1. Excision of ganglion cyst, foot (56824)    Surgeon:   Donald Beltran MD    Anesthesia:  General  Local    EBL:  < 10 cc    Complications:  none    Specimen:  Ganglion Cyst    Implants:  none     HPI/Indication:  49-year-old female with history of ganglion cyst of the left foot. This is been present for years. She has failed conservative measures such as previous aspirations and shoe modification. She is undergoing significant dysfunction from the inability to wear normal shoes because of her pain. She is requesting surgical treatment to excise the ganglion cyst.  I discussed risks associated with the ganglion cyst excision which include,  but are not limited to wound healing problems, bleeding, infection, nerve injury. Patient understands these risks and wishes to proceed. Procedure: The patient arrived at the surgical center. Consent was obtained for the surgical procedure, as well as the Anesthetic. The correct surgical limb was identified and marked by the patient and myself. The patient was then brought to the Operating suite, where a time-out was performed which identified the patient's identity, the surgical site, and the surgical procedure. The patient was prepped and draped in standard, sterile fashion. Once completely draped, another time-out was performed identifying all pertinent information. The ganglion cyst was palpated. 1% plain lidocaine was injected in the subcutaneous tissue. An incision was then made centered over the ganglion cyst.  Care was taken to avoid cutaneous sensory nerves. The ganglion cyst was in countered and excised. Attempt was made at removing the cyst en bloc, but the close association with the extensor tendons made this impossible. The ganglion cyst was traced to its stalk and excised. The cyst was sent to pathology.     The wounds were irrigated and closed in a layered fashion. A sterile dressing of xeroform, 4x4s and sterile webril was applied. Drapes were then removed and a ACE wrap was applied.       Plan:  - Weightbearing: WBAT in post op shoe  - Keep dressing clean and dry  - Elevate extremity just above level of heart  - Pain control  - DVT ppx - ambulation  - Dispo: DC to home

## 2017-04-13 ENCOUNTER — HOSPITAL ENCOUNTER (OUTPATIENT)
Dept: MAMMOGRAPHY | Age: 52
Discharge: HOME | End: 2017-04-13
Attending: OBSTETRICS & GYNECOLOGY
Payer: COMMERCIAL

## 2017-04-13 DIAGNOSIS — Z12.31 VISIT FOR SCREENING MAMMOGRAM: ICD-10-CM

## 2017-04-13 PROCEDURE — 77067 SCR MAMMO BI INCL CAD: CPT

## 2017-04-25 ENCOUNTER — OFFICE VISIT (OUTPATIENT)
Dept: INTERNAL MEDICINE CLINIC | Age: 52
End: 2017-04-25

## 2017-04-25 VITALS
OXYGEN SATURATION: 98 % | TEMPERATURE: 97.9 F | SYSTOLIC BLOOD PRESSURE: 99 MMHG | HEART RATE: 67 BPM | BODY MASS INDEX: 24.38 KG/M2 | WEIGHT: 164.6 LBS | DIASTOLIC BLOOD PRESSURE: 70 MMHG | RESPIRATION RATE: 19 BRPM | HEIGHT: 69 IN

## 2017-04-25 DIAGNOSIS — R73.03 PREDIABETES: ICD-10-CM

## 2017-04-25 DIAGNOSIS — E78.5 DYSLIPIDEMIA: ICD-10-CM

## 2017-04-25 DIAGNOSIS — Z12.11 COLON CANCER SCREENING: ICD-10-CM

## 2017-04-25 DIAGNOSIS — Z11.59 NEED FOR HEPATITIS C SCREENING TEST: ICD-10-CM

## 2017-04-25 DIAGNOSIS — E03.9 ACQUIRED HYPOTHYROIDISM: Primary | ICD-10-CM

## 2017-05-18 ENCOUNTER — OFFICE VISIT (OUTPATIENT)
Dept: ENDOCRINOLOGY | Age: 52
End: 2017-05-18

## 2017-05-18 VITALS
OXYGEN SATURATION: 98 % | RESPIRATION RATE: 16 BRPM | DIASTOLIC BLOOD PRESSURE: 63 MMHG | SYSTOLIC BLOOD PRESSURE: 98 MMHG | BODY MASS INDEX: 24.65 KG/M2 | HEART RATE: 73 BPM | WEIGHT: 166.4 LBS | HEIGHT: 69 IN

## 2017-05-18 DIAGNOSIS — E03.9 ACQUIRED HYPOTHYROIDISM: Primary | ICD-10-CM

## 2017-05-18 DIAGNOSIS — E04.1 THYROID NODULE: ICD-10-CM

## 2017-05-18 DIAGNOSIS — R73.03 PREDIABETES: ICD-10-CM

## 2017-05-18 DIAGNOSIS — E78.1 HYPERTRIGLYCERIDEMIA: ICD-10-CM

## 2017-05-18 RX ORDER — METFORMIN HYDROCHLORIDE 500 MG/1
500 TABLET, EXTENDED RELEASE ORAL
Qty: 90 TAB | Refills: 3 | Status: SHIPPED | OUTPATIENT
Start: 2017-05-18 | End: 2017-08-09 | Stop reason: SDUPTHER

## 2017-05-18 RX ORDER — LEVOTHYROXINE SODIUM 88 UG/1
88 TABLET ORAL
Qty: 90 TAB | Refills: 3 | Status: SHIPPED | OUTPATIENT
Start: 2017-05-18 | End: 2017-09-05 | Stop reason: SDUPTHER

## 2017-05-18 NOTE — PROGRESS NOTES
Endocrinology Visit    CC: hypothyroidism    History of present illness:  Denise Celeste is a pleasant 45 y.o. female here for f/u of hypothyroidism and prediabetes. She was previously followed by endocrinologist Dr Pranav Mckeon. I saw her in initial consultation in January at which time TSH was slightly low on Levoxyl 88 mcg daily so I advised her to continue this dose but take 1/2 tablet one day/week. I also ordered a thyroid ultrasound which showed a small 1.3cm isoechoic nodule. Due to her family history of PTC, I recommended that she have an FNA but we discussed this further and she decided to wait and do f/u imaging instead. In the interim, she reports difficulty losing weight despite increasing her exercise and reducing the carbs in her diet. TGs on recent labs returned very elevated >400. She is surprised by this because previous values have been in the mid 100s-200 range (197 in 2016). She is not taking a statin or other cholesterol medications. Reports being unable to tolerate both fish oil and flaxseed oil in the past. She is newly post-menopausal. Hot flashes are improved since starting black cohosh. Tried estrogen cream and vaginal pill but was unable to tolerate both. She feels menopause is contributing to her difficulty losing weight. She also has a history of prediabetes. Most recent labs showed A1c at 5.8% in Jan, up from 5.6% in July 2016. She does have a family history of diabetes. She has tried to reduce her bread and sweets intake. Only drinks water, eliminated sodas. She was diagnosed with hypothyroidism over 15 years ago. She was initially treated with levothyroxine but later switched to [place] thyroid because it was more natural. She currently takes brand name Levoxyl (levothyroxine) 88 mcg daily. She  takes this correctly on an empty stomach in the morning. Since switching back to levothyroxine she reports feeling much better - hair loss seems to have resolved and her energy level is better. She lost about 10 lbs last year, was down to 157 lbs, then regained some weight over the holidays which she has been unable to lose. She denies racing heart, tremor, palpitations, heat or cold intolerance, constipation, diarrhea or energy changes. ROS: see HPI for pertinent positives and negatives, otherwise a 10 system review is negative    Problem list:  Patient Active Problem List   Diagnosis Code    Hypothyroidism E03.9    Allergic rhinitis J30.9    Microscopic hematuria R31.29    Dyslipidemia E78.5    Dysuria R30.0    Prediabetes R73.03    Thyroid nodule E04.1       Medical history:  Past Medical History:   Diagnosis Date    Adverse effect of anesthesia     anxiety with waking up    Asthma     hx of asthma r/t allergies/emotional response    Diabetes ([facility] [address]     pre diabetic/ no meds    Hx of mammogram 10/2/12    Nausea & vomiting     Nephrolithiasis     right kidney stone    Other ill-defined conditions     echocardiogram,12/2010,  has murmur    Thyroid disease     enlarged & inflamed thryoid/ hypo        Past surgical history:  Past Surgical History:   Procedure Laterality Date    ABDOMEN SURGERY PROC UNLISTED      laparoscopy to check for endometriosis    HX BUNIONECTOMY  2001    ,right bunion    HX HERNIA REPAIR      umbilical    HX HYSTERECTOMY  2007    laparoscopic hysterecomy, rectocele repair, umbilical hernia repair, sling    HX ORTHOPAEDIC Right 2001    foot-bunion with hardware    HX RECTOCELE REPAIR  2007    HX TONSILLECTOMY  age 8   [de-identified] UROLOGICAL  2007    urethral sling       Medications:    Current Outpatient Prescriptions:     TURMERIC ROOT EXTRACT PO, Take  by mouth., Disp: , Rfl:     black cohosh 540 mg cap, Take  by mouth., Disp: , Rfl:     LEVOXYL 88 mcg tablet, Take 1 Tab by mouth Daily (before breakfast). BRAND MEDICALLY NECESSARY (Patient taking differently: Take 88 mcg by mouth Daily (before breakfast).  BRAND MEDICALLY NECESSARY   Indications: Sundays- takes 1.5 pill), Disp: 30 Tab, Rfl: 10    Biotin 2,500 mcg cap, Take 1,500 mcg by mouth., Disp: , Rfl:     Mv,Ca,Min-FA-Herbal No.157 400 mcg tab, Take  by mouth., Disp: , Rfl:     cholecalciferol, vitamin D3, 2,000 unit tab, Take 2,000 Units by mouth daily. , Disp: , Rfl:     magnesium oxide (MAG-OX) 400 mg tablet, Take 400 mg by mouth daily. , Disp: , Rfl:     VITAMIN E, DL,TOCOPHERYL ACET, (VITAMIN E ACETATE) 400 unit cap capsule, Take  by mouth daily. , Disp: , Rfl:     multivits,Stress Formula-Zinc tablet, Take 1 Tab by mouth daily. , Disp: , Rfl:     oxyCODONE IR (ROXICODONE) 5 mg immediate release tablet, Take 1 Tab by mouth every four (4) hours as needed for Pain. Max Daily Amount: 30 mg., Disp: 40 Tab, Rfl: 0    Allergies:   Allergies   Allergen Reactions    Pcn [Penicillins] Nausea Only     Orally only,can take intravenously       Social History:  Social History     Social History    Marital status:      Spouse name:     Number of children: 3    Years of education: N/A     Occupational History    Mammographer-Kahlil Gonzalez     Social History Main Topics    Smoking status: Former Smoker     Packs/day: 1.00     Years: 10.00     Types: Cigarettes     Quit date: 7/13/1989    Smokeless tobacco: Never Used    Alcohol use 1.0 oz/week     2 Glasses of wine per week      Comment: occasional    Drug use: No    Sexual activity: Not Currently     Other Topics Concern    Exercise No     runs regularly     Social History Narrative    One son, two daughters       Family History:  Family History   Problem Relation Age of Onset    Thyroid Disease Father     Cancer Father      CML    Thyroid Disease Brother     Thyroid Cancer Brother     Heart Disease Maternal Grandfather     Heart Disease Paternal Grandfather        Physical Exam:  Visit Vitals    BP 98/63    Pulse 73    Resp 16    Ht 5' 9\" (1.753 m)    Wt 166 lb 6.4 oz (75.5 kg)    SpO2 98%    BMI 24.57 kg/m2     Gen: NAD  Heent: mucous membranes moist, no lid lag, stare or exophthalmos. No scleral or conjunctival injection. Extra ocular muscles intact . Thyroid: moves well with swallowing, normal size, normal texture, soft and mobile 1cm nodule palpated in left upper lobe  Heme/lymph: no cervical, supraclavicular or submandibular lymphadenopathy is appreciated. Pulmonary: clear to auscultation bilaterally, no wheezes/rhonchi or rales  Cardiovascular: regular rate and rhythm, no murmurs, rubs or gallops, good distal pulses  Extremities: no clubbing, cyanosis or edema. No onocholysis   Neuro: no tremor of outstretched hands, reflexes are normal with normal relaxation phase. Normal gait, normal concentration  Skin: normal texture, warm and dry   Psyche: normal affect with good insight into her medical conditions    Pertinent lab review: Pertinent lab results from [email or web address] are transcribed in HPI and scanned into the medical record. January 2017 labs:  A1c 5.8%  TSH 0.37  FT4 1.4  FT3 2.7  Thyroid antibodies ordered but not done - [email or web address] did a \"thyroid panel\" instead    Labs transcribed in Dr Adela Jensen previous note:    7/2016 health screening labs  Fasting glucose 90, A1c 5.6  Total chol 192, HDL 39.5, ,   TSH 1.49 in 6/2016 8/19/2016 labs  Prolactin 28  DHEA-S - low at 28  Testosterone - low at < 3  ALT 37    8/31/2016 labs  Prolactin 6, LH 7.2, FSH 9.5, estradiol 294 - all within normal range    Ultrasound Jan 2017  INDICATION: hypothyroidism with family history of PTC in her brother, please  evaluate for thyroid nodules      Grayscale and color Doppler ultrasound of the thyroid.     There is no prior study for direct comparison.     The right thyroid lobe measures 5.6 cm x 2.7 cm x 2.4 cm. The left thyroid lobe  measures 5.9 cm x 2.4 cm x 1.9 cm. The thyroid isthmus measures 6 mm AP  dimension. The thyroid gland inhomogeneous and hypervascular.  There is a 1.5 cm  x 7 mm x 1.2 cm nodule within the superior pole of the left thyroid lobe.      IMPRESSION: Mildly enlarged, inhomogeneous, hypervascular thyroid gland. An 0.5  cm x 7 mm x 1.2 cm nodule within the superior pole of the left thyroid lobe. Assessment and plan:  Patient is a pleasant 45y.o. year old here for hypothyroidism and prediabetes. 1. Acquired hypothyroidism: Etiology unknown, suspect due to Hashimoto's given the appearance of her gland on US. She clinically appears euthyroid by exam on current dose of levothyroxine 88 mcg daily, except 44 mcg daily 1d/wk. She is taking her thyroid medication correctly. To further assess thyroid hormone economy, we will check a TSH and Free T4. Will adjust medication if needed to achieve TSH~ 1-2. Sx have improved after switching from [place] to levothyroxine so have advised her to continue with T4 thyroid supplementation. 2. Thyroid nodule: she has an isoechoic 1.3cm left sided nodule without suspicious features. We discussed possible FNA but have decided to do f/u ultrasonography instead given its benign appearance. Plan to repeat US in Jan 2018. 3. Prediabetes: A1c 5.8% in Jan, will repeat today. We discussed adding low dose metformin to help improve glycemic control and aid with weight loss. 4. Hypertriglyceridemia: etiology unknown, TGs have increased from 197 to >400 in the span of a year. She says the recent sample was fasting and denies excessive dietary carbohydrate intake. If anything, she has been eating fewer carbs. Checking A1c and starting metformin above. If TGs remain elevated despite this, we discussed possibly starting fenofibrate. I spent 25 minutes with the patient today and > 50% of the time was spent counseling the patient about hypothyroidism: its etiology, clinical manifestations, diagnosis, and management. She will return in 4 months time. Thank you for the opportunity to partake in this patients care.   Jovanna Yost MD  [facility]  [address] [place] [address]

## 2017-05-24 ENCOUNTER — OFFICE VISIT (OUTPATIENT)
Dept: INTERNAL MEDICINE CLINIC | Age: 52
End: 2017-05-24

## 2017-05-24 VITALS
HEART RATE: 70 BPM | SYSTOLIC BLOOD PRESSURE: 80 MMHG | TEMPERATURE: 98.2 F | OXYGEN SATURATION: 97 % | HEIGHT: 69 IN | DIASTOLIC BLOOD PRESSURE: 60 MMHG | BODY MASS INDEX: 24.79 KG/M2 | RESPIRATION RATE: 19 BRPM | WEIGHT: 167.4 LBS

## 2017-05-24 DIAGNOSIS — E78.1 HYPERTRIGLYCERIDEMIA: ICD-10-CM

## 2017-05-24 DIAGNOSIS — E03.9 ACQUIRED HYPOTHYROIDISM: ICD-10-CM

## 2017-05-24 DIAGNOSIS — Z00.00 WELL ADULT EXAM: Primary | ICD-10-CM

## 2017-05-24 NOTE — PATIENT INSTRUCTIONS

## 2017-05-24 NOTE — MR AVS SNAPSHOT
Visit Information Date & Time Provider Department Dept. Phone Encounter #  
 5/24/2017  3:00 PM [facility] [phone] [id number] Follow-up Instructions Return in about 6 months (around 11/24/2017) for Follow up. Your Appointments 9/25/2017  2:30 PM  
ROUTINE CARE with MD Kodi Christiansen [facility] Endocr[facility]) Appt Note: f/u diabetes cp0.00  
 [address]  [address]  
[address] [facility] [address] [address] Upcoming Health Maintenance Date Due Hepatitis C Screening 1965 FOBT Q 1 YEAR AGE 50-75 3/30/2013 INFLUENZA AGE 9 TO ADULT 8/1/2017 BREAST CANCER SCRN MAMMOGRAM 4/13/2019 PAP AKA CERVICAL CYTOLOGY 4/25/2022 DTaP/Tdap/Td series (2 - Td) 11/28/2026 Allergies as of 5/24/2017  Review Complete On: 5/24/2017 By: Daniela Miller MD  
  
 Severity Noted Reaction Type Reactions Pcn [Penicillins] Low 07/13/2011   Side Effect Nausea Only Orally only,can take intravenously Current Immunizations  Reviewed on 11/28/2016 Name Date Td 6/1/2011 Tdap 11/28/2016 Not reviewed this visit You Were Diagnosed With   
  
 Codes Comments Well adult exam    -  Primary ICD-10-CM: Z00.00 ICD-9-CM: V70.0 Hypertriglyceridemia     ICD-10-CM: E78.1 ICD-9-CM: 272.1 Acquired hypothyroidism     ICD-10-CM: E03.9 ICD-9-CM: 627. 9 Vitals BP Pulse Temp Resp Height(growth percentile) Weight(growth percentile) (!) 80/60 (BP 1 Location: Right arm, BP Patient Position: Sitting) 70 98.2 °F (36.8 °C) (Oral) 19 5' 9\" (1.753 m) 167 lb 6.4 oz (75.9 kg) SpO2 BMI OB Status Smoking Status 97% 24.72 kg/m2 Hysterectomy Former Smoker Vitals History BMI and BSA Data Body Mass Index Body Surface Area 24.72 kg/m 2 1.92 m 2 Preferred Pharmacy Pharmacy Name Phone [facility] [phone] Your Updated Medication List  
  
   
This list is accurate as of: 5/24/17  3:46 PM.  Always use your most recent med list.  
  
  
  
  
 Biotin 2,500 mcg Cap Take 1,500 mcg by mouth. black cohosh 540 mg Cap Take  by mouth. cholecalciferol (vitamin D3) 2,000 unit Tab Take 2,000 Units by mouth daily. LEVOXYL 88 mcg tablet Generic drug:  levothyroxine Take 1 Tab by mouth Daily (before breakfast). BRAND MEDICALLY NECESSARY  
  
 magnesium oxide 400 mg tablet Commonly known as:  MAG-OX Take 400 mg by mouth daily. metFORMIN  mg tablet Commonly known as:  GLUCOPHAGE XR Take 1 Tab by mouth daily (with dinner). multivits,Stress Formula-Zinc tablet Take 1 Tab by mouth daily. Mv,Ca,Min-FA-Herbal No.157 400 mcg Tab Take  by mouth. oxyCODONE IR 5 mg immediate release tablet Commonly known as:  Thressa Lacie Take 1 Tab by mouth every four (4) hours as needed for Pain. Max Daily Amount: 30 mg.  
  
 TURMERIC ROOT EXTRACT PO Take  by mouth.  
  
 vitamin E acetate 400 unit Cap capsule Take  by mouth daily. Follow-up Instructions Return in about 6 months (around 11/24/2017) for Follow up. Patient Instructions High Cholesterol: Care Instructions Your Care Instructions Cholesterol is a type of fat in your blood. It is needed for many body functions, such as making new cells. Cholesterol is made by your body. It also comes from food you eat. High cholesterol means that you have too much of the fat in your blood. This raises your risk of a heart attack and stroke. LDL and HDL are part of your total cholesterol. LDL is the \"bad\" cholesterol. High LDL can raise your risk for heart disease, heart attack, and stroke. HDL is the \"good\" cholesterol. It helps clear bad cholesterol from the body.  High HDL is linked with a lower risk of heart disease, heart attack, and stroke. Your cholesterol levels help your doctor find out your risk for having a heart attack or stroke. You and your doctor can talk about whether you need to lower your risk and what treatment is best for you. A heart-healthy lifestyle along with medicines can help lower your cholesterol and your risk. The way you choose to lower your risk will depend on how high your risk is for heart attack and stroke. It will also depend on how you feel about taking medicines. Follow-up care is a key part of your treatment and safety. Be sure to make and go to all appointments, and call your doctor if you are having problems. It's also a good idea to know your test results and keep a list of the medicines you take. How can you care for yourself at home? · Eat a variety of foods every day. Good choices include fruits, vegetables, whole grains (like oatmeal), dried beans and peas, nuts and seeds, soy products (like tofu), and fat-free or low-fat dairy products. · Replace butter, margarine, and hydrogenated or partially hydrogenated oils with olive and canola oils. (Canola oil margarine without trans fat is fine.) · Replace red meat with fish, poultry, and soy protein (like tofu). · Limit processed and packaged foods like chips, crackers, and cookies. · Bake, broil, or steam foods. Don't orozco them. · Be physically active. Get at least 30 minutes of exercise on most days of the week. Walking is a good choice. You also may want to do other activities, such as running, swimming, cycling, or playing tennis or team sports. · Stay at a healthy weight or lose weight by making the changes in eating and physical activity listed above. Losing just a small amount of weight, even 5 to 10 pounds, can reduce your risk for having a heart attack or stroke. · Do not smoke. When should you call for help? Watch closely for changes in your health, and be sure to contact your doctor if: · You need help making lifestyle changes. · You have questions about your medicine. Where can you learn more? Go to http://jessica-faustina.info/. Enter X324 in the search box to learn more about \"High Cholesterol: Care Instructions. \" Current as of: January 27, 2016 Content Version: 11.2 © 7985-4616 [employer]. Care instructions adapted under license by [employer] (which disclaims liability or warranty for this information). If you have questions about a medical condition or this instruction, always ask your healthcare professional. [address] any warranty or liability for your use of this information. Introducing [facility]! Dear Adilson Price: Thank you for requesting a [employer] account. Our records indicate that you already have an active [employer] account. You can access your account anytime at [email or web address] Did you know that you can access your hospital and ER discharge instructions at any time in [employer]? You can also review all of your test results from your hospital stay or ER visit. Additional Information If you have questions, please visit the Frequently Asked Questions section of the [employer] website at [email or web address] Remember, [employer] is NOT to be used for urgent needs. For medical emergencies, dial 911. Now available from your iPhone and Android! Please provide this summary of care documentation to your next provider. Your primary care clinician is listed as Chico Moy. If you have any questions after today's visit, please call [phone].

## 2017-05-24 NOTE — PROGRESS NOTES
Comprehensive Physical Examination    Shabbir Kaur is a 45 y.o. female. she presents for a comprehensive physical examination. Cardiovascular Review  The patient has hyperlipidemia. She reports taking medications as instructed, no medication side effects noted, no swelling of ankles, no palpitations, no muscle aches or pain. Diet and Lifestyle: generally follows a low fat low cholesterol diet, generally follows a low sodium diet, exercises sporadically. Lab review: no lab studies available for review at time of visit. Pressure is noted to be low--she remains on thyroid medication. Tolerating this. She has an upcoming appointment with endocrine. Patient Active Problem List    Diagnosis Date Noted    Hypertriglyceridemia 05/18/2017    Thyroid nodule 01/23/2017    Prediabetes 01/16/2017    Dysuria 11/05/2015    Dyslipidemia 03/23/2013    Hypothyroidism 03/12/2013    Allergic rhinitis 03/12/2013    Microscopic hematuria 03/12/2013     Current Outpatient Prescriptions   Medication Sig Dispense Refill    metFORMIN ER (GLUCOPHAGE XR) 500 mg tablet Take 1 Tab by mouth daily (with dinner). 90 Tab 3    LEVOXYL 88 mcg tablet Take 1 Tab by mouth Daily (before breakfast). BRAND MEDICALLY NECESSARY 90 Tab 3    multivits,Stress Formula-Zinc tablet Take 1 Tab by mouth daily.  TURMERIC ROOT EXTRACT PO Take  by mouth.  black cohosh 540 mg cap Take  by mouth.  Biotin 2,500 mcg cap Take 1,500 mcg by mouth.  Mv,Ca,Min-FA-Herbal No.157 400 mcg tab Take  by mouth.  cholecalciferol, vitamin D3, 2,000 unit tab Take 2,000 Units by mouth daily.  magnesium oxide (MAG-OX) 400 mg tablet Take 400 mg by mouth daily.  VITAMIN E, DL,TOCOPHERYL ACET, (VITAMIN E ACETATE) 400 unit cap capsule Take  by mouth daily.  oxyCODONE IR (ROXICODONE) 5 mg immediate release tablet Take 1 Tab by mouth every four (4) hours as needed for Pain.  Max Daily Amount: 30 mg. 40 Tab 0     Allergies Allergen Reactions    Pcn [Penicillins] Nausea Only     Orally only,can take intravenously     Past Medical History:   Diagnosis Date    Adverse effect of anesthesia     anxiety with waking up    Asthma     hx of asthma r/t allergies/emotional response    Diabetes ([address]     pre diabetic/ no meds    Hx of mammogram 10/2/12    Nausea & vomiting     Nephrolithiasis     right kidney stone    Other ill-defined conditions     echocardiogram,12/2010,  has murmur    Thyroid disease     enlarged & inflamed thryoid/ hypo      Past Surgical History:   Procedure Laterality Date    ABDOMEN SURGERY PROC UNLISTED      laparoscopy to check for endometriosis    HX BUNIONECTOMY  2001    ,right bunion    HX HERNIA REPAIR      umbilical    HX HYSTERECTOMY  2007    laparoscopic hysterecomy, rectocele repair, umbilical hernia repair, sling    HX ORTHOPAEDIC Right 2001    foot-bunion with hardware    HX RECTOCELE REPAIR  2007    HX TONSILLECTOMY  age 8    HX UROLOGICAL  2007    urethral sling     Family History   Problem Relation Age of Onset    Thyroid Disease Father     Cancer Father      CML    Thyroid Disease Brother     Thyroid Cancer Brother     Heart Disease Maternal Grandfather     Heart Disease Paternal Grandfather      Social History   Substance Use Topics    Smoking status: Former Smoker     Packs/day: 1.00     Years: 10.00     Types: Cigarettes     Quit date: 7/13/1989    Smokeless tobacco: Never Used    Alcohol use 1.0 oz/week     2 Glasses of wine per week      Comment: occasional        Health Maintenance   Topic Date Due    Hepatitis C Screening  1965    FOBT Q 1 YEAR AGE 50-75  03/30/2013    INFLUENZA AGE 9 TO ADULT  08/01/2017    BREAST CANCER SCRN MAMMOGRAM  04/13/2019    PAP AKA CERVICAL CYTOLOGY  04/25/2022    DTaP/Tdap/Td series (2 - Td) 11/28/2026         Review of Systems   Constitutional: Negative. Respiratory: Negative. Cardiovascular: Negative.     Gastrointestinal: Negative. Visit Vitals    BP (!) 80/60 (BP 1 Location: Right arm, BP Patient Position: Sitting)    Pulse 70    Temp 98.2 °F (36.8 °C) (Oral)    Resp 19    Ht 5' 9\" (1.753 m)    Wt 167 lb 6.4 oz (75.9 kg)    SpO2 97%    BMI 24.72 kg/m2       Physical Exam   Constitutional: She is oriented to person, place, and time and well-developed, well-nourished, and in no distress. No distress. Eyes: Pupils are equal, round, and reactive to light. Cardiovascular: Normal rate and regular rhythm. Pulmonary/Chest: Effort normal and breath sounds normal. No respiratory distress. Abdominal: Soft. Bowel sounds are normal.   Musculoskeletal: She exhibits no edema. Neurological: She is alert and oriented to person, place, and time. ASSESSMENT/PLAN  Noa Molina was seen today for complete physical.    Diagnoses and all orders for this visit:    Well adult exam    Hypertriglyceridemia    Acquired hypothyroidism        Follow-up Disposition:  Return in about 6 months (around 11/24/2017) for Follow up. Repeat lipids in 3 months.      Continue metformin

## 2017-08-02 ENCOUNTER — PATIENT MESSAGE (OUTPATIENT)
Dept: ENDOCRINOLOGY | Age: 52
End: 2017-08-02

## 2017-08-09 NOTE — TELEPHONE ENCOUNTER
From: Monse Saini  To: Rogelio Jett MD  Sent: 8/2/2017 3:26 PM EDT  Subject: Prescription Question    Dr Agata Samuels,     The Metformin continues to make my hair dry and brittle, plus makes my mouth really dry. Would there be a similar medication I could try or maybe brand instead of generic?     Thanks,  Shanice Blankenship

## 2017-09-01 RX ORDER — METFORMIN HYDROCHLORIDE 500 MG/1
500 TABLET ORAL 2 TIMES DAILY WITH MEALS
Qty: 60 TAB | Refills: 3 | Status: SHIPPED | OUTPATIENT
Start: 2017-09-01 | End: 2017-10-31 | Stop reason: SDUPTHER

## 2017-09-05 RX ORDER — LEVOTHYROXINE SODIUM 88 UG/1
88 TABLET ORAL
Qty: 90 TAB | Refills: 1 | Status: SHIPPED | OUTPATIENT
Start: 2017-09-05 | End: 2018-05-30 | Stop reason: SDUPTHER

## 2017-09-28 ENCOUNTER — OFFICE VISIT (OUTPATIENT)
Dept: ENDOCRINOLOGY | Age: 52
End: 2017-09-28

## 2017-09-28 VITALS
DIASTOLIC BLOOD PRESSURE: 62 MMHG | OXYGEN SATURATION: 98 % | SYSTOLIC BLOOD PRESSURE: 93 MMHG | RESPIRATION RATE: 16 BRPM | WEIGHT: 164 LBS | HEART RATE: 76 BPM | BODY MASS INDEX: 24.29 KG/M2 | HEIGHT: 69 IN

## 2017-09-28 DIAGNOSIS — E78.1 HYPERTRIGLYCERIDEMIA: ICD-10-CM

## 2017-09-28 DIAGNOSIS — E04.1 THYROID NODULE: ICD-10-CM

## 2017-09-28 DIAGNOSIS — E03.9 ACQUIRED HYPOTHYROIDISM: Primary | ICD-10-CM

## 2017-09-28 DIAGNOSIS — R73.03 PREDIABETES: ICD-10-CM

## 2017-09-28 DIAGNOSIS — G57.93 NEUROPATHIC PAIN OF BOTH LEGS: ICD-10-CM

## 2017-09-28 RX ORDER — INSULIN PUMP SYRINGE, 3 ML
EACH MISCELLANEOUS
Qty: 1 KIT | Refills: 0 | Status: SHIPPED | OUTPATIENT
Start: 2017-09-28 | End: 2018-02-06

## 2017-09-28 NOTE — PROGRESS NOTES
Endocrinology Visit    CC: hypothyroidism    History of present illness:  Fabi Jalloh is a pleasant 45 y.o. female here for f/u of hypothyroidism and prediabetes. She was previously followed by endocrinologist Dr Maximiliano Ham. I saw her in initial consultation in January at which time TSH was slightly low on Levoxyl 88 mcg daily so I advised her to continue this dose but take 1/2 tablet one day/week. I also ordered a thyroid ultrasound which showed a small 1.3cm isoechoic nodule. Due to her family history of PTC, I recommended that she have an FNA but we discussed this further and she decided to wait and do f/u imaging instead. At her last f/u visit in May, I started her on metformin due to h/o prediabetes and recent development of hypertriglyceridemia (TGs on recent labs returned very elevated >400, however repeat was 232 on 5/20/17). In the interim, she reports not being able to tolerate the metformin XR, but is doing well with regular metformin 500mg daily. Feels this has helped decrease the neuropathy symptoms and swelling in her feet. She wonders why she is developing neuropathy, denies lower back pain or sciatica. She is interested in checking her blood sugars to gather more data about the correlation between her symptoms and her BG. She has lost 3 lbs by increasing her exercise and reducing the carbs in her diet. She is not taking a statin or other cholesterol medications. Reports being unable to tolerate both fish oil and flaxseed oil in the past.     She is newly post-menopausal. Hot flashes are improved since starting black cohosh. Tried estrogen cream and vaginal pill but was unable to tolerate both. She feels menopause is contributing to her difficulty losing weight. She also has a history of prediabetes. Most recent labs showed A1c at 5.5%, down from 5.8% in Jan. She does have a family history of diabetes. She has tried to reduce her bread and sweets intake. Only drinks water, eliminated sodas.     She was diagnosed with hypothyroidism over 15 years ago. She was initially treated with levothyroxine but later switched to [place] thyroid because it was more natural. She currently takes brand name Levoxyl (levothyroxine) 88 mcg daily, except she takes 44 mcg daily 1d/wk. She  takes this correctly on an empty stomach in the morning. Since switching back to levothyroxine she reports feeling much better - hair loss seems to have resolved and her energy level is better. She denies racing heart, tremor, palpitations, heat or cold intolerance, constipation, diarrhea or energy changes. Had some swelling in her neck which she feels is resolved now.     ROS: see HPI for pertinent positives and negatives, otherwise a 10 system review is negative    Problem list:  Patient Active Problem List   Diagnosis Code    Hypothyroidism E03.9    Allergic rhinitis J30.9    Microscopic hematuria R31.29    Dyslipidemia E78.5    Dysuria R30.0    Prediabetes R73.03    Thyroid nodule E04.1    Hypertriglyceridemia E78.1    Neuropathic pain of both legs G57.91, G57.92       Medical history:  Past Medical History:   Diagnosis Date    Adverse effect of anesthesia     anxiety with waking up    Asthma     hx of asthma r/t allergies/emotional response    Diabetes [address] [address]     pre diabetic/ no meds    Hx of mammogram 10/2/12    Nausea & vomiting     Nephrolithiasis     right kidney stone    Other ill-defined conditions     echocardiogram,12/2010,  has murmur    Thyroid disease     enlarged & inflamed thryoid/ hypo        Past surgical history:  Past Surgical History:   Procedure Laterality Date    ABDOMEN SURGERY PROC UNLISTED      laparoscopy to check for endometriosis    HX BUNIONECTOMY  2001    ,right bunion    HX HERNIA REPAIR      umbilical    HX HYSTERECTOMY  2007    laparoscopic hysterecomy, rectocele repair, umbilical hernia repair, sling    HX ORTHOPAEDIC Right 2001    foot-bunion with hardware    HX RECTOCELE REPAIR  2007    HX TONSILLECTOMY  age 8    HX UROLOGICAL  2007    urethral sling       Medications:    Current Outpatient Prescriptions:     Blood-Glucose Meter monitoring kit, Check BG 2-3 times/day, Disp: 1 Kit, Rfl: 0    glucose blood VI test strips (BLOOD GLUCOSE TEST) strip, Check BG 2-3 times/day, Disp: 100 Strip, Rfl: 11    LEVOXYL 88 mcg tablet, Take 1 Tab by mouth Daily (before breakfast). BRAND MEDICALLY NECESSARY, Disp: 90 Tab, Rfl: 1    metFORMIN (GLUCOPHAGE) 500 mg tablet, Take 1 Tab by mouth two (2) times daily (with meals). , Disp: 60 Tab, Rfl: 3    multivits,Stress Formula-Zinc tablet, Take 1 Tab by mouth daily. , Disp: , Rfl:     TURMERIC ROOT EXTRACT PO, Take  by mouth., Disp: , Rfl:     black cohosh 540 mg cap, Take  by mouth., Disp: , Rfl:     Biotin 2,500 mcg cap, Take 1,500 mcg by mouth., Disp: , Rfl:     cholecalciferol, vitamin D3, 2,000 unit tab, Take 2,000 Units by mouth daily. , Disp: , Rfl:     magnesium oxide (MAG-OX) 400 mg tablet, Take 400 mg by mouth daily. , Disp: , Rfl:     VITAMIN E, DL,TOCOPHERYL ACET, (VITAMIN E ACETATE) 400 unit cap capsule, Take  by mouth daily. , Disp: , Rfl:     oxyCODONE IR (ROXICODONE) 5 mg immediate release tablet, Take 1 Tab by mouth every four (4) hours as needed for Pain. Max Daily Amount: 30 mg., Disp: 40 Tab, Rfl: 0    Mv,Ca,Min-FA-Herbal No.157 400 mcg tab, Take  by mouth., Disp: , Rfl:     Allergies:   Allergies   Allergen Reactions    Pcn [Penicillins] Nausea Only     Orally only,can take intravenously       Social History:  Social History     Social History    Marital status:      Spouse name:     Number of children: 3    Years of education: N/A     Occupational History    Mammographer-Kahlil Gonzalez     Social History Main Topics    Smoking status: Former Smoker     Packs/day: 1.00     Years: 10.00     Types: Cigarettes     Quit date: 7/13/1989    Smokeless tobacco: Never Used    Alcohol use 1.0 oz/week     2 Glasses of wine per week      Comment: occasional    Drug use: No    Sexual activity: Not Currently     Other Topics Concern    Exercise No     runs regularly     Social History Narrative    One son, two daughters       Family History:  Family History   Problem Relation Age of Onset    Thyroid Disease Father     Cancer Father      CML    Thyroid Disease Brother     Thyroid Cancer Brother     Heart Disease Maternal Grandfather     Heart Disease Paternal Grandfather        Physical Exam:  Visit Vitals    BP 93/62    Pulse 76    Resp 16    Ht 5' 9\" (1.753 m)    Wt 164 lb (74.4 kg)    SpO2 98%    BMI 24.22 kg/m2     Gen: NAD  Heent: mucous membranes moist, no lid lag, stare or exophthalmos. No scleral or conjunctival injection. Extra ocular muscles intact . Thyroid: moves well with swallowing, normal size, normal texture, soft and mobile 1cm nodule palpated in left upper lobe  Pulmonary: clear to auscultation bilaterally, no wheezes/rhonchi or rales  Cardiovascular: regular rate and rhythm, no murmurs, rubs or gallops, good distal pulses  Extremities: no clubbing, cyanosis or edema. No onocholysis   Neuro: no tremor of outstretched hands, reflexes are normal with normal relaxation phase. Normal gait, normal concentration  Skin: normal texture, warm and dry   Psyche: normal affect with good insight into her medical conditions    Diabetic foot exam:   Left: Filament test normal sensation with micro filament   Pulse DP: 2+ (normal)   Deformities: None  Right: Filament test normal sensation with micro filament   Pulse DP: 2+ (normal)   Deformities: None    Pertinent lab review: Pertinent lab results from [facility] are transcribed in HPI and scanned into the medical record.     Lipids 5/19/17: total chol 202, HDL 41, ,       January 2017  May 2017  A1c   5.8%   5.5%  TSH   0.37   0.43  FT4   1.4   1.1  FT3   2.7    Thyroid antibodies ordered but not done - [facility] did a \"thyroid panel\" instead    Labs transcribed in Dr Irene Mejias note:    7/2016 health screening labs  Fasting glucose 90, A1c 5.6  Total chol 192, HDL 39.5, ,   TSH 1.49 in 6/2016 8/19/2016 labs  Prolactin 28  DHEA-S - low at 28  Testosterone - low at < 3  ALT 37    8/31/2016 labs  Prolactin 6, LH 7.2, FSH 9.5, estradiol 294 - all within normal range    Ultrasound Jan 2017  INDICATION: hypothyroidism with family history of PTC in her brother, please  evaluate for thyroid nodules      Grayscale and color Doppler ultrasound of the thyroid.     There is no prior study for direct comparison.     The right thyroid lobe measures 5.6 cm x 2.7 cm x 2.4 cm. The left thyroid lobe  measures 5.9 cm x 2.4 cm x 1.9 cm. The thyroid isthmus measures 6 mm AP  dimension. The thyroid gland inhomogeneous and hypervascular. There is a 1.5 cm  x 7 mm x 1.2 cm nodule within the superior pole of the left thyroid lobe.      IMPRESSION: Mildly enlarged, inhomogeneous, hypervascular thyroid gland. An 0.5  cm x 7 mm x 1.2 cm nodule within the superior pole of the left thyroid lobe. Assessment and plan:  Patient is a pleasant 45 y.o. female here for hypothyroidism and prediabetes. She is also experiencing symptoms of neuropathy in her feet. 1. Acquired hypothyroidism: Etiology unknown, suspect due to Hashimoto's given the appearance of her gland on US. She clinically appears euthyroid by exam on current dose of levothyroxine 88 mcg daily, except 44 mcg daily 1d/wk. She is taking her thyroid medication correctly. To further assess thyroid hormone economy, we will check a TSH and Free T4. Will adjust medication if needed to achieve TSH~ 1-2. Sx have improved after switching from [place] to levothyroxine so have advised her to continue with T4 thyroid supplementation. 2. Prediabetes: A1c was 5.8% in January and decreased to 5.5% prior to initiating metformin therapy. Pt feels symptomatically improved on metformin so will continue this at low dose.  Recheck A1c today and sent in Rx for glucometer and test strips. 3. Neuropathic pain of both legs: unknown etiology. I doubt this is related to her glycemic control as A1c had decreased to 5.5% before starting metformin therapy. Will have pt start regular glucose checks to see if her symptoms correlate with higher BG readings. Also check B12 and folate today. Consider referral to neurology if these tests are unrevealing. Offered to prescribe a medication for neuropathic pain but pt would rather avoid additional Rx at this time. 4. Thyroid nodule: she has an isoechoic 1.3cm left sided nodule without suspicious features. We discussed possible FNA but have decided to do f/u ultrasonography instead given its benign appearance. Plan to repeat US in early 2018. 5. Hypertriglyceridemia: started metformin and advised a low-carb diet + exercise. Her PCP will be rechecking fasting lipids soon. If TGs remain elevated despite metformin and dietary changes, we discussed possibly starting fenofibrate. I spent 25 minutes with the patient today and > 50% of the time was spent counseling the patient about hypothyroidism: its etiology, clinical manifestations, diagnosis, and management. She will return in 4 months time. Thank you for the opportunity to partake in this patients care.   Samantha Vo MD  [facility] Endocrinology  [address] [facility]

## 2017-10-09 ENCOUNTER — TELEPHONE (OUTPATIENT)
Dept: INTERNAL MEDICINE CLINIC | Age: 52
End: 2017-10-09

## 2017-10-09 NOTE — TELEPHONE ENCOUNTER
Patient calls states she had her biometric screening done by Ramona Bailey and states her fasting triglycerides came back at 365, pt requests a medication called in if applicable until she can be scheduled to come in, please advise

## 2017-10-11 ENCOUNTER — OFFICE VISIT (OUTPATIENT)
Dept: INTERNAL MEDICINE CLINIC | Age: 52
End: 2017-10-11

## 2017-10-11 VITALS
TEMPERATURE: 97.7 F | SYSTOLIC BLOOD PRESSURE: 110 MMHG | DIASTOLIC BLOOD PRESSURE: 78 MMHG | OXYGEN SATURATION: 96 % | HEART RATE: 76 BPM | WEIGHT: 162.8 LBS | BODY MASS INDEX: 24.11 KG/M2 | HEIGHT: 69 IN | RESPIRATION RATE: 20 BRPM

## 2017-10-11 DIAGNOSIS — Z23 ENCOUNTER FOR IMMUNIZATION: ICD-10-CM

## 2017-10-11 DIAGNOSIS — E78.1 HYPERTRIGLYCERIDEMIA: Primary | ICD-10-CM

## 2017-10-11 NOTE — MR AVS SNAPSHOT
Visit Information Date & Time Provider Department Dept. Phone Encounter #  
 10/11/2017 11:45 AM Susana[facility] [phone] [id number] Your Appointments 2/6/2018  8:30 AM  
ROUTINE CARE with MD Kodi Dsouza [facility]) Appt Note: f/u diabetes cp0.00  
 [address] [place] [address]  
[address] [facility] [address] [phone] Upcoming Health Maintenance Date Due FOBT Q 1 YEAR AGE 50-75 3/30/2013 INFLUENZA AGE 9 TO ADULT 8/1/2017 BREAST CANCER SCRN MAMMOGRAM 4/13/2019 PAP AKA CERVICAL CYTOLOGY 4/25/2022 DTaP/Tdap/Td series (2 - Td) 11/28/2026 Allergies as of 10/11/2017  Review Complete On: 10/11/2017 By: Leyla Parker Severity Noted Reaction Type Reactions Pcn [Penicillins] Low 07/13/2011   Side Effect Nausea Only Orally only,can take intravenously Current Immunizations  Reviewed on 11/28/2016 Name Date Td 6/1/2011 Tdap 11/28/2016 Not reviewed this visit You Were Diagnosed With   
  
 Codes Comments Hypertriglyceridemia    -  Primary ICD-10-CM: E78.1 ICD-9-CM: 272.1 Vitals BP Pulse Temp Resp Height(growth percentile) Weight(growth percentile) 110/78 (BP 1 Location: Right arm, BP Patient Position: Sitting) 76 97.7 °F (36.5 °C) (Oral) 20 5' 9\" (1.753 m) 162 lb 12.8 oz (73.8 kg) SpO2 BMI OB Status Smoking Status 96% 24.04 kg/m2 Hysterectomy Former Smoker Vitals History BMI and BSA Data Body Mass Index Body Surface Area 24.04 kg/m 2 1.9 m 2 Preferred Pharmacy Pharmacy Name Phone [facility] [address] [phone] Your Updated Medication List  
  
   
This list is accurate as of: 10/11/17 12:49 PM.  Always use your most recent med list.  
  
  
  
  
 Biotin 2,500 mcg Cap Take 1,500 mcg by mouth. black cohosh 540 mg Cap Take  by mouth. Blood-Glucose Meter monitoring kit Check BG 2-3 times/day  
  
 cholecalciferol (vitamin D3) 2,000 unit Tab Take 2,000 Units by mouth daily. glucose blood VI test strips strip Commonly known as:  blood glucose test  
Check BG 2-3 times/day LEVOXYL 88 mcg tablet Generic drug:  levothyroxine Take 1 Tab by mouth Daily (before breakfast). BRAND MEDICALLY NECESSARY  
  
 magnesium oxide 400 mg tablet Commonly known as:  MAG-OX Take 400 mg by mouth daily. metFORMIN 500 mg tablet Commonly known as:  GLUCOPHAGE Take 1 Tab by mouth two (2) times daily (with meals). multivits,Stress Formula-Zinc tablet Take 1 Tab by mouth daily. Mv,Ca,Min-FA-Herbal No.157 400 mcg Tab Take  by mouth. oxyCODONE IR 5 mg immediate release tablet Commonly known as:  [place] Take 1 Tab by mouth every four (4) hours as needed for Pain. Max Daily Amount: 30 mg.  
  
 TURMERIC ROOT EXTRACT PO Take  by mouth.  
  
 vitamin E acetate 400 unit Cap capsule Take  by mouth daily. We Performed the Following LIPID PANEL [11868 CPT(R)] METABOLIC PANEL, COMPREHENSIVE [97743 CPT(R)] Patient Instructions Please decrease your dairy! We will check labs in 3 months. Introducing [facility] & HEALTH SERVICES! Dear Chuck Dobson: Thank you for requesting a Kymab account. Our records indicate that you already have an active Kymab account. You can access your account anytime at [email or web address]. IceWEB/[facility] Did you know that you can access your hospital and ER discharge instructions at any time in Kymab? You can also review all of your test results from your hospital stay or ER visit. Additional Information If you have questions, please visit the Frequently Asked Questions section of the Kymab website at [email or web address]. [email or web address]/[facility] [employer]/. Remember, [employer] is NOT to be used for urgent needs. For medical emergencies, dial 911. Now available from your iPhone and Android! Please provide this summary of care documentation to your next provider. Your primary care clinician is listed as Ariadna Bhatti. If you have any questions after today's visit, please call [phone].

## 2017-10-19 NOTE — PROGRESS NOTES
Follow Up Visit    Beryle Spar is a 45 y.o. female. she presents for Cholesterol Problem      The patient comes to discuss her elevated triglyceride level. This has been elevated in the past and she has noted this on several past lab tests. However, most recently, she had a lipid panel done for a wellness evaluation at her job. On this test, she noted her triglyceride level to be well past 250. She is concerned regarding this. On discussion with the patient, she reports drinking large amounts of milk (2%) on a regular basis. She also indulges in cheese often. Other than these increased intakes of dairy foods, she eats a healthy diet of lean meats and vegetables. She notes her ETOH intake to be minimal.     Patient Active Problem List   Diagnosis Code    Hypothyroidism E03.9    Allergic rhinitis J30.9    Microscopic hematuria R31.29    Dyslipidemia E78.5    Dysuria R30.0    Prediabetes R73.03    Thyroid nodule E04.1    Hypertriglyceridemia E78.1    Neuropathic pain of both legs G57.91, G57.92         Prior to Admission medications    Medication Sig Start Date End Date Taking? Authorizing Provider   LEVOXYL 88 mcg tablet Take 1 Tab by mouth Daily (before breakfast). BRAND MEDICALLY NECESSARY 9/5/17  Yes Елена Cruz MD   metFORMIN (GLUCOPHAGE) 500 mg tablet Take 1 Tab by mouth two (2) times daily (with meals). 9/1/17  Yes Shalini Ram MD   multivits,Stress Formula-Zinc tablet Take 1 Tab by mouth daily. Yes Historical Provider   TURMERIC ROOT EXTRACT PO Take  by mouth. Yes Historical Provider   black cohosh 540 mg cap Take  by mouth. Yes Historical Provider   Biotin 2,500 mcg cap Take 1,500 mcg by mouth. Yes Moraima Apodaca MD   cholecalciferol, vitamin D3, 2,000 unit tab Take 2,000 Units by mouth daily. Yes Historical Provider   magnesium oxide (MAG-OX) 400 mg tablet Take 400 mg by mouth daily.    Yes Historical Provider   VITAMIN E, DL,TOCOPHERYL ACET, (VITAMIN E ACETATE) 400 unit cap capsule Take  by mouth daily. Yes Historical Provider   Blood-Glucose Meter monitoring kit Check BG 2-3 times/day 9/28/17   Aislinn Pickering MD   glucose blood VI test strips (BLOOD GLUCOSE TEST) strip Check BG 2-3 times/day 9/28/17   Aislinn Pickering MD   oxyCODONE IR (ROXICODONE) 5 mg immediate release tablet Take 1 Tab by mouth every four (4) hours as needed for Pain. Max Daily Amount: 30 mg. 2/9/17   Alin Post MD   Mv,Ca,Min-FA-Herbal No.157 400 mcg tab Take  by mouth. Phys Other, MD         Health Maintenance   Topic Date Due    FOBT Q 1 YEAR AGE 50-75  03/30/2013    BREAST CANCER SCRN MAMMOGRAM  04/13/2019    PAP AKA CERVICAL CYTOLOGY  04/25/2022    DTaP/Tdap/Td series (2 - Td) 11/28/2026    Hepatitis C Screening  Completed    INFLUENZA AGE 9 TO ADULT  Completed       Review of Systems   Constitutional: Negative. Respiratory: Negative. Cardiovascular: Negative. Gastrointestinal: Negative. Visit Vitals    /78 (BP 1 Location: Right arm, BP Patient Position: Sitting)    Pulse 76    Temp 97.7 °F (36.5 °C) (Oral)    Resp 20    Ht 5' 9\" (1.753 m)    Wt 162 lb 12.8 oz (73.8 kg)    SpO2 96%    BMI 24.04 kg/m2       Physical Exam   Constitutional: She appears well-developed and well-nourished. Cardiovascular: Normal rate and regular rhythm. No murmur heard. Pulmonary/Chest: Effort normal and breath sounds normal.         ASSESSMENT/PLAN    Diagnoses and all orders for this visit:    1. Hypertriglyceridemia - The patient will dramatically reduce her dairy intake and we will recheck her lipid panel for triglyceride. Discussed with the patient at length. -     LIPID PANEL  -     METABOLIC PANEL, COMPREHENSIVE    2.  Encounter for immunization  -     INFLUENZA VIRUS VACCINE QUADRIVALENT, PRESERVATIVE FREE SYRINGE ([id number])  -     SD IMMUNIZ ADMIN,1 SINGLE/COMB VAC/TOXOID        Follow-up Disposition:  Return in about 3 months (around 1/11/2018) for Follow up.

## 2017-10-31 RX ORDER — METFORMIN HYDROCHLORIDE 500 MG/1
TABLET ORAL
Qty: 180 TAB | Refills: 3 | Status: SHIPPED | OUTPATIENT
Start: 2017-10-31 | End: 2018-01-19 | Stop reason: SDUPTHER

## 2017-11-15 ENCOUNTER — OFFICE VISIT (OUTPATIENT)
Dept: INTERNAL MEDICINE CLINIC | Age: 52
End: 2017-11-15

## 2017-11-15 VITALS
SYSTOLIC BLOOD PRESSURE: 120 MMHG | RESPIRATION RATE: 20 BRPM | TEMPERATURE: 97.7 F | OXYGEN SATURATION: 98 % | BODY MASS INDEX: 23.76 KG/M2 | HEART RATE: 78 BPM | HEIGHT: 69 IN | WEIGHT: 160.4 LBS | DIASTOLIC BLOOD PRESSURE: 80 MMHG

## 2017-11-15 DIAGNOSIS — E78.1 HYPERTRIGLYCERIDEMIA: Primary | ICD-10-CM

## 2017-11-15 DIAGNOSIS — G57.93 NEUROPATHIC PAIN OF BOTH LEGS: ICD-10-CM

## 2017-11-15 NOTE — PROGRESS NOTES
Follow Up Visit    Brandee Bagley is a 45 y.o. female. she presents for Cholesterol Problem    The patient comes to discuss her recent dietary changes. She notes that since our last visit, she has cut out most of her dairy consumption. She was previously drinking 2-3 half gallons of milk per week and eating cheese regularly. She has removed the milk and is eating much less cheese. She reports having an improvement in her skin dryness and also she has experienced relief of her neuropathy (nearly 80-90% better in her legs per the patient). She has also lost weight and reports having much more energy. Patient Active Problem List   Diagnosis Code    Hypothyroidism E03.9    Allergic rhinitis J30.9    Microscopic hematuria R31.29    Dyslipidemia E78.5    Dysuria R30.0    Prediabetes R73.03    Thyroid nodule E04.1    Hypertriglyceridemia E78.1    Neuropathic pain of both legs G57.91, G57.92         Prior to Admission medications    Medication Sig Start Date End Date Taking? Authorizing Provider   metFORMIN (GLUCOPHAGE) 500 mg tablet TAKE ONE TABLET BY MOUTH 2 TIMES A DAY WITH MEALS 10/31/17  Yes Simona Hays MD   Blood-Glucose Meter monitoring kit Check BG 2-3 times/day 9/28/17  Yes Simona Hays MD   glucose blood VI test strips (BLOOD GLUCOSE TEST) strip Check BG 2-3 times/day 9/28/17  Yes Simona Hays MD   LEVOXYL 88 mcg tablet Take 1 Tab by mouth Daily (before breakfast). BRAND MEDICALLY NECESSARY 9/5/17  Yes Diana Terry MD   multivits,Stress Formula-Zinc tablet Take 1 Tab by mouth daily. Yes Historical Provider   TURMERIC ROOT EXTRACT PO Take  by mouth. Yes Historical Provider   black cohosh 540 mg cap Take  by mouth. Yes Historical Provider   Biotin 2,500 mcg cap Take 1,500 mcg by mouth. Yes Moraima Apodaca MD   cholecalciferol, vitamin D3, 2,000 unit tab Take 2,000 Units by mouth daily.    Yes Historical Provider   magnesium oxide (MAG-OX) 400 mg tablet Take 400 mg by mouth daily. Yes Historical Provider   VITAMIN E, DL,TOCOPHERYL ACET, (VITAMIN E ACETATE) 400 unit cap capsule Take  by mouth daily. Yes Historical Provider   oxyCODONE IR (ROXICODONE) 5 mg immediate release tablet Take 1 Tab by mouth every four (4) hours as needed for Pain. Max Daily Amount: 30 mg. 2/9/17   Alin Arteaga MD   Mv,Ca,Min-FA-Herbal No.157 400 mcg tab Take  by mouth. Phys Other, MD         Health Maintenance   Topic Date Due    FOBT Q 1 YEAR AGE 50-75  03/30/2013    BREAST CANCER SCRN MAMMOGRAM  04/13/2019    PAP AKA CERVICAL CYTOLOGY  04/25/2022    DTaP/Tdap/Td series (2 - Td) 11/28/2026    Hepatitis C Screening  Completed    Influenza Age 5 to Adult  Completed       Review of Systems   Constitutional: Negative. Cardiovascular: Negative. Gastrointestinal: Negative. Psychiatric/Behavioral: Negative. Visit Vitals    /80 (BP 1 Location: Right arm, BP Patient Position: Sitting)    Pulse 78    Temp 97.7 °F (36.5 °C) (Oral)    Resp 20    Ht 5' 9\" (1.753 m)    Wt 160 lb 6.4 oz (72.8 kg)    SpO2 98%    BMI 23.69 kg/m2       Physical Exam   Constitutional: She is oriented to person, place, and time. No distress. Neurological: She is alert and oriented to person, place, and time. Skin: Skin is warm and dry. No rash noted. No erythema. ASSESSMENT/PLAN    Diagnoses and all orders for this visit:    1. Hypertriglyceridemia - Discussed with the patient. The positive effects are likely due to her decreased dairy intake (less sugar and dietary fat). Encouraged the patient to continue with her healthy habits and to monitor the continued improvement. Triglycerides will be rechecked in 2 months. 2. Neuropathic pain of both legs - This has improved. Likely for the above mentioned reasons. Continue as she has been doing.

## 2017-11-30 ENCOUNTER — OFFICE VISIT (OUTPATIENT)
Dept: PRIMARY CARE CLINIC | Age: 52
End: 2017-11-30

## 2017-11-30 VITALS
OXYGEN SATURATION: 98 % | HEIGHT: 69 IN | SYSTOLIC BLOOD PRESSURE: 108 MMHG | TEMPERATURE: 98 F | BODY MASS INDEX: 23.82 KG/M2 | HEART RATE: 69 BPM | RESPIRATION RATE: 18 BRPM | DIASTOLIC BLOOD PRESSURE: 72 MMHG | WEIGHT: 160.8 LBS

## 2017-11-30 DIAGNOSIS — R35.0 URINARY FREQUENCY: ICD-10-CM

## 2017-11-30 DIAGNOSIS — N39.0 URINARY TRACT INFECTION WITHOUT HEMATURIA, SITE UNSPECIFIED: Primary | ICD-10-CM

## 2017-11-30 LAB
BILIRUB UR QL STRIP: NEGATIVE
GLUCOSE UR-MCNC: NEGATIVE MG/DL
KETONES P FAST UR STRIP-MCNC: NEGATIVE MG/DL
PH UR STRIP: 6.5 [PH] (ref 4.6–8)
PROT UR QL STRIP: NEGATIVE
SP GR UR STRIP: 1.02 (ref 1–1.03)
UA UROBILINOGEN AMB POC: NORMAL (ref 0.2–1)
URINALYSIS CLARITY POC: CLEAR
URINALYSIS COLOR POC: YELLOW
URINE BLOOD POC: NEGATIVE
URINE LEUKOCYTES POC: NEGATIVE
URINE NITRITES POC: NEGATIVE

## 2017-11-30 RX ORDER — CIPROFLOXACIN 250 MG/1
250 TABLET, FILM COATED ORAL 2 TIMES DAILY
Qty: 10 TAB | Refills: 0 | Status: SHIPPED | OUTPATIENT
Start: 2017-11-30 | End: 2017-12-05

## 2017-11-30 NOTE — MR AVS SNAPSHOT
Visit Information Date & Time Provider Department Dept. Phone Encounter #  
 11/30/2017  6:15 PM Preethi LuzWesly [id number] Your Appointments 2/6/2018  8:30 AM  
ROUTINE CARE with MD Kodi Schuster Diabetes and Endocrinology [address] [facility]) Appt Note: f/u diabetes cp0.00  
 [address] [place] [address]  
[address] [facility] [phone] Upcoming Health Maintenance Date Due FOBT Q 1 YEAR AGE 50-75 3/30/2013 BREAST CANCER SCRN MAMMOGRAM 4/13/2019 PAP AKA CERVICAL CYTOLOGY 4/25/2022 DTaP/Tdap/Td series (2 - Td) 11/28/2026 Allergies as of 11/30/2017  Review Complete On: 11/30/2017 By: Preethi Luz MD  
  
 Severity Noted Reaction Type Reactions Pcn [Penicillins] Low 07/13/2011   Side Effect Nausea Only Orally only,can take intravenously Current Immunizations  Reviewed on 10/11/2017 Name Date Influenza Vaccine (Quad) PF 10/11/2017 Td 6/1/2011 Tdap 11/28/2016 Not reviewed this visit You Were Diagnosed With   
  
 Codes Comments Urinary tract infection without hematuria, site unspecified    -  Primary ICD-10-CM: N39.0 ICD-9-CM: 599.0 Urinary frequency     ICD-10-CM: R35.0 ICD-9-CM: 788.41 Vitals BP Pulse Temp Resp Height(growth percentile) Weight(growth percentile) 108/72 (BP 1 Location: Left arm, BP Patient Position: Sitting) 69 98 °F (36.7 °C) (Oral) 18 5' 9\" (1.753 m) 160 lb 12.8 oz (72.9 kg) SpO2 BMI OB Status Smoking Status 98% 23.75 kg/m2 Hysterectomy Former Smoker Vitals History BMI and BSA Data Body Mass Index Body Surface Area  
 23.75 kg/m 2 1.88 m 2 Preferred Pharmacy Pharmacy Name Phone [id number] [phone] Your Updated Medication List  
  
   
 This list is accurate as of: 11/30/17  6:46 PM.  Always use your most recent med list.  
  
  
  
  
 AZO BLADDER CONTROL 300 mg Cap Generic drug:  pumpkin seed extract-soy germ Take  by mouth. Biotin 2,500 mcg Cap Take 1,500 mcg by mouth. black cohosh 540 mg Cap Take  by mouth. Blood-Glucose Meter monitoring kit Check BG 2-3 times/day  
  
 cholecalciferol (vitamin D3) 2,000 unit Tab Take 2,000 Units by mouth daily. ciprofloxacin HCl 250 mg tablet Commonly known as:  CIPRO Take 1 Tab by mouth two (2) times a day for 5 days. glucose blood VI test strips strip Commonly known as:  blood glucose test  
Check BG 2-3 times/day LEVOXYL 88 mcg tablet Generic drug:  levothyroxine Take 1 Tab by mouth Daily (before breakfast). BRAND MEDICALLY NECESSARY  
  
 magnesium oxide 400 mg tablet Commonly known as:  MAG-OX Take 400 mg by mouth daily. metFORMIN 500 mg tablet Commonly known as:  GLUCOPHAGE  
TAKE ONE TABLET BY MOUTH 2 TIMES A DAY WITH MEALS  
  
 multivits,Stress Formula-Zinc tablet Take 1 Tab by mouth daily. Mv,Ca,Min-FA-Herbal No.157 400 mcg Tab Take  by mouth. oxyCODONE IR 5 mg immediate release tablet Commonly known as:  Alice Ramal Take 1 Tab by mouth every four (4) hours as needed for Pain. Max Daily Amount: 30 mg.  
  
 TURMERIC ROOT EXTRACT PO Take  by mouth.  
  
 vitamin E acetate 400 unit Cap capsule Take  by mouth daily. Prescriptions Sent to Pharmacy Refills  
 ciprofloxacin HCl (CIPRO) 250 mg tablet 0 Sig: Take 1 Tab by mouth two (2) times a day for 5 days. Class: Normal  
 Pharmacy: [id number] Ph #: [phone] Route: Oral  
  
We Performed the Following AMB POC URINALYSIS DIP STICK AUTO W/O MICRO [71733 CPT(R)] CULTURE, URINE [id number] CPT(R)] To-Do List   
 12/13/2017 5:45 PM  
 Appointment with [facility] PT ([phone])  
  
 12/22/2017 8:45 AM  
  Appointment with [facility] PT ([phone]) Patient Instructions Urinary Tract Infection in Women: Care Instructions Your Care Instructions A urinary tract infection, or UTI, is a general term for an infection anywhere between the kidneys and the urethra (where urine comes out). Most UTIs are bladder infections. They often cause pain or burning when you urinate. UTIs are caused by bacteria and can be cured with antibiotics. Be sure to complete your treatment so that the infection goes away. Follow-up care is a key part of your treatment and safety. Be sure to make and go to all appointments, and call your doctor if you are having problems. It's also a good idea to know your test results and keep a list of the medicines you take. How can you care for yourself at home? · Take your antibiotics as directed. Do not stop taking them just because you feel better. You need to take the full course of antibiotics. · Drink extra water and other fluids for the next day or two. This may help wash out the bacteria that are causing the infection. (If you have kidney, heart, or liver disease and have to limit fluids, talk with your doctor before you increase your fluid intake.) · Avoid drinks that are carbonated or have caffeine. They can irritate the bladder. · Urinate often. Try to empty your bladder each time. · To relieve pain, take a hot bath or lay a heating pad set on low over your lower belly or genital area. Never go to sleep with a heating pad in place. To prevent UTIs · Drink plenty of water each day. This helps you urinate often, which clears bacteria from your system. (If you have kidney, heart, or liver disease and have to limit fluids, talk with your doctor before you increase your fluid intake.) · Urinate when you need to. · Urinate right after you have sex. · Change sanitary pads often. · Avoid douches, bubble baths, feminine hygiene sprays, and other feminine hygiene products that have deodorants. · After going to the bathroom, wipe from front to back. When should you call for help? Call your doctor now or seek immediate medical care if: 
? · Symptoms such as fever, chills, nausea, or vomiting get worse or appear for the first time. ? · You have new pain in your back just below your rib cage. This is called flank pain. ? · There is new blood or pus in your urine. ? · You have any problems with your antibiotic medicine. ? Watch closely for changes in your health, and be sure to contact your doctor if: 
? · You are not getting better after taking an antibiotic for 2 days. ? · Your symptoms go away but then come back. Where can you learn more? Go to http://jessica-faustina.info/. Enter Q548 in the search box to learn more about \"Urinary Tract Infection in Women: Care Instructions. \" Current as of: May 12, 2017 Content Version: 11.4 © 8182-5643 [employer]. Care instructions adapted under license by [employer] (which disclaims liability or warranty for this information). If you have questions about a medical condition or this instruction, always ask your healthcare professional. [address] any warranty or liability for your use of this information. Introducing hospitals & HEALTH SERVICES! Dear Tristin Guardian: Thank you for requesting a Flowdock account. Our records indicate that you already have an active Flowdock account. You can access your account anytime at [email or web address] Linki/[employer]. Did you know that you can access your hospital and ER discharge instructions at any time in Flowdock? You can also review all of your test results from your hospital stay or ER visit. Additional Information If you have questions, please visit the Frequently Asked Questions section of the ROBLOX website at [email or web address] Remember, ROBLOX is NOT to be used for urgent needs. For medical emergencies, dial 911. Now available from your iPhone and Android! Please provide this summary of care documentation to your next provider. Your primary care clinician is listed as Mark Saleh. If you have any questions after today's visit, please call [phone].

## 2017-11-30 NOTE — PATIENT INSTRUCTIONS

## 2017-11-30 NOTE — PROGRESS NOTES
Quita Panchal is a 45 y.o. female who presents for dysuria, urgency and frequency for 4 days. She has tried azo which has not helped. She denies nausea or vomiting or fever. Past Medical History:   Diagnosis Date    Adverse effect of anesthesia     anxiety with waking up    Asthma     hx of asthma r/t allergies/emotional response    Diabetes ([address]     pre diabetic/ no meds    Hx of mammogram 10/2/12    Nausea & vomiting     Nephrolithiasis     right kidney stone    Other ill-defined conditions(799.89)     echocardiogram,12/2010,  has murmur    Thyroid disease     enlarged & inflamed thryoid/ hypo      Past Surgical History:   Procedure Laterality Date    ABDOMEN SURGERY PROC UNLISTED      laparoscopy to check for endometriosis    HX BUNIONECTOMY  2001    ,right bunion    HX HERNIA REPAIR      umbilical    HX HYSTERECTOMY  2007    laparoscopic hysterecomy, rectocele repair, umbilical hernia repair, sling    HX ORTHOPAEDIC Right 2001    foot-bunion with hardware    HX RECTOCELE REPAIR  2007    HX TONSILLECTOMY  age 8   [facility]  2007    urethral sling         Meds:   Current Outpatient Prescriptions   Medication Sig Dispense Refill    pumpkin seed extract-soy germ (AZO BLADDER CONTROL) 300 mg cap Take  by mouth.  metFORMIN (GLUCOPHAGE) 500 mg tablet TAKE ONE TABLET BY MOUTH 2 TIMES A DAY WITH MEALS 180 Tab 3    LEVOXYL 88 mcg tablet Take 1 Tab by mouth Daily (before breakfast). BRAND MEDICALLY NECESSARY 90 Tab 1    multivits,Stress Formula-Zinc tablet Take 1 Tab by mouth daily.  TURMERIC ROOT EXTRACT PO Take  by mouth.  black cohosh 540 mg cap Take  by mouth.  Biotin 2,500 mcg cap Take 1,500 mcg by mouth.  cholecalciferol, vitamin D3, 2,000 unit tab Take 2,000 Units by mouth daily.  magnesium oxide (MAG-OX) 400 mg tablet Take 400 mg by mouth daily.  VITAMIN E, DL,TOCOPHERYL ACET, (VITAMIN E ACETATE) 400 unit cap capsule Take  by mouth daily.       Blood-Glucose Meter monitoring kit Check BG 2-3 times/day 1 Kit 0    glucose blood VI test strips (BLOOD GLUCOSE TEST) strip Check BG 2-3 times/day 100 Strip 11    oxyCODONE IR (ROXICODONE) 5 mg immediate release tablet Take 1 Tab by mouth every four (4) hours as needed for Pain. Max Daily Amount: 30 mg. 40 Tab 0    Mv,Ca,Min-FA-Herbal No.157 400 mcg tab Take  by mouth. Allergies: Allergies   Allergen Reactions    Pcn [Penicillins] Nausea Only     Orally only,can take intravenously       Smoker:  History   Smoking Status    Former Smoker    Packs/day: 1.00    Years: 10.00    Types: Cigarettes    Quit date: 7/13/1989   Smokeless Tobacco    Never Used       ETOH:   History   Alcohol Use    1.0 oz/week    2 Glasses of wine per week     Comment: occasional       FH:   Family History   Problem Relation Age of Onset    Thyroid Disease Father     Cancer Father      CML    Thyroid Disease Brother     Thyroid Cancer Brother     Heart Disease Maternal Grandfather     Heart Disease Paternal Grandfather        ROS:  General/Constitutional:   No headache or fever     Cardiac:    No chest pain      Respiratory:   No cough or shortness of breath     Back: No pain  GI:   No nausea/vomiting or abdominal pain       :   Dysuria, urgency and frequency but no hematuria    Skin: No rash     Physical Exam:  Visit Vitals    /72 (BP 1 Location: Left arm, BP Patient Position: Sitting)    Pulse 69    Temp 98 °F (36.7 °C) (Oral)    Resp 18    Ht 5' 9\" (1.753 m)    Wt 160 lb 12.8 oz (72.9 kg)    SpO2 98%    BMI 23.75 kg/m2     Gen: NAD. Responds to all questions appropriately. Eye: Conjunctiva are clear. Lungs: No labored respirations. CTAB. CV: RRR; no m/r/g  Back: No CVA tenderness  Abdomen: +BS. Soft. Nontender. No rebound or guarding  Ext: Moving all ext equally.     Lab:  UA:  Results for orders placed or performed in visit on 11/30/17   AMB POC URINALYSIS DIP STICK AUTO W/O MICRO     Status: None   Result Value Ref Range Status    Color (UA POC) Yellow  Final    Clarity (UA POC) Clear  Final    Glucose (UA POC) Negative Negative Final    Bilirubin (UA POC) Negative Negative Final    Ketones (UA POC) Negative Negative Final    Specific gravity (UA POC) 1.020 1.001 - 1.035 Final    Blood (UA POC) Negative Negative Final    pH (UA POC) 6.5 4.6 - 8.0 Final    Protein (UA POC) Negative Negative Final    Urobilinogen (UA POC) 0.2 mg/dL 0.2 - 1 Final    Nitrites (UA POC) Negative Negative Final    Leukocyte esterase (UA POC) Negative Negative Final         Assessment:    ICD-10-CM ICD-9-CM    1. Urinary tract infection without hematuria, site unspecified N39.0 599.0 ciprofloxacin HCl (CIPRO) 250 mg tablet      CULTURE, URINE   2. Urinary frequency R35.0 788.41 AMB POC URINALYSIS DIP STICK AUTO W/O MICRO     UA negative, however will treat based on symptoms pending culture. ED warnings discussed. Plan:    General instruction:  1. Drink plenty of fluids. 2. You can use Uristat or Azostandard to help with symptoms. These medications can turn your urine an orange color. Symptoms should resolve within 24-48 hours after starting the antibiotic. 3. Follow-up if symptoms not improving in 2-3 days. 4. If you develop fever, abdomenal pain, back pain, or nausea and vomiting then return to clinic or go to the emergency room. This could indicate that you have a more serious infection or and infection in the kidneys. This note will not be viewable in [address]

## 2017-12-01 ENCOUNTER — TELEPHONE (OUTPATIENT)
Dept: INTERNAL MEDICINE CLINIC | Age: 52
End: 2017-12-01

## 2017-12-01 RX ORDER — PHENAZOPYRIDINE HYDROCHLORIDE 200 MG/1
200 TABLET, FILM COATED ORAL 2 TIMES DAILY
Qty: 10 TAB | Refills: 0 | Status: SHIPPED | OUTPATIENT
Start: 2017-12-01 | End: 2017-12-06

## 2017-12-01 NOTE — TELEPHONE ENCOUNTER
Patient went to [facility] clinic yesterday for UTI, she had episodes of shakiness and sweating last night, today went to work and had to leave due to extreme discomfort of her bladder, she has been taking her Cipro bid and waiting for culture results. Pt requests an Rx for the medication that numbs the urinary area until culture results come back.  Says AZO otc does not work for her , please advise

## 2017-12-01 NOTE — TELEPHONE ENCOUNTER
The patient went to the [facility] yesterday and is still having bladder pain. She is so sick she has left work. Can you call in something else?

## 2017-12-04 LAB
BACTERIA UR CULT: ABNORMAL
BACTERIA UR CULT: ABNORMAL

## 2017-12-04 NOTE — PROGRESS NOTES
Please call patient and inform that her urine culture grew enterococcus that is susceptible to cipro so she should finish the course.

## 2017-12-07 ENCOUNTER — OFFICE VISIT (OUTPATIENT)
Dept: INTERNAL MEDICINE CLINIC | Age: 52
End: 2017-12-07

## 2017-12-07 ENCOUNTER — HOSPITAL ENCOUNTER (OUTPATIENT)
Dept: ULTRASOUND IMAGING | Age: 52
Discharge: HOME | End: 2017-12-07
Attending: INTERNAL MEDICINE
Payer: COMMERCIAL

## 2017-12-07 ENCOUNTER — TELEPHONE (OUTPATIENT)
Dept: PRIMARY CARE CLINIC | Age: 52
End: 2017-12-07

## 2017-12-07 VITALS
HEART RATE: 77 BPM | SYSTOLIC BLOOD PRESSURE: 90 MMHG | OXYGEN SATURATION: 99 % | WEIGHT: 158 LBS | RESPIRATION RATE: 19 BRPM | HEIGHT: 69 IN | DIASTOLIC BLOOD PRESSURE: 68 MMHG | TEMPERATURE: 98.6 F | BODY MASS INDEX: 23.4 KG/M2

## 2017-12-07 DIAGNOSIS — R10.9 RIGHT FLANK PAIN: ICD-10-CM

## 2017-12-07 DIAGNOSIS — R30.0 DYSURIA: Primary | ICD-10-CM

## 2017-12-07 LAB
BILIRUB UR QL STRIP: NEGATIVE
GLUCOSE UR-MCNC: NEGATIVE MG/DL
KETONES P FAST UR STRIP-MCNC: NEGATIVE MG/DL
PH UR STRIP: 7 [PH] (ref 4.6–8)
PROT UR QL STRIP: NEGATIVE
SP GR UR STRIP: 1.02 (ref 1–1.03)
UA UROBILINOGEN AMB POC: NORMAL (ref 0.2–1)
URINALYSIS CLARITY POC: CLEAR
URINALYSIS COLOR POC: YELLOW
URINE BLOOD POC: NEGATIVE
URINE LEUKOCYTES POC: NEGATIVE
URINE NITRITES POC: NEGATIVE

## 2017-12-07 PROCEDURE — 76770 US EXAM ABDO BACK WALL COMP: CPT

## 2017-12-07 RX ORDER — NITROFURANTOIN 25; 75 MG/1; MG/1
100 CAPSULE ORAL 2 TIMES DAILY
Qty: 10 CAP | Refills: 0 | Status: SHIPPED | OUTPATIENT
Start: 2017-12-07 | End: 2017-12-12

## 2017-12-07 NOTE — PROGRESS NOTES
Acute Care Note    Danielle Cooper is 45 y.o. female. she presents for evaluation of Flank Pain and Urinary Frequency      The patient presents after having been evaluated by the [facility] clinic 7 days ago. She was noted at the time to have right flank pain and dysuria. She was evaluated with urinalysis which failed to show any abnormality. A culture was done at that time. She was empirically placed on ciprofloxacin twice daily for 5 days. She reports feeling somewhat better over the ensuing days. However the day after completing therapy she has began to feel poorly and then on the next day she had significant right flank pain. Her dysuria has not returned. Her culture from the previous from the previous visit was found to show 100,000 colony-forming units of enterococcus. This isolate was sensitive to Cipro. Today, she complains of continuing right flank pain. She no longer has dysuria. Prior to Admission medications    Medication Sig Start Date End Date Taking? Authorizing Provider   nitrofurantoin, macrocrystal-monohydrate, (MACROBID) 100 mg capsule Take 1 Cap by mouth two (2) times a day for 5 days. 12/7/17 12/12/17 Yes Cody Mondragon MD   pumpkin seed extract-soy germ (AZO BLADDER CONTROL) 300 mg cap Take  by mouth. Yes Historical Provider   metFORMIN (GLUCOPHAGE) 500 mg tablet TAKE ONE TABLET BY MOUTH 2 TIMES A DAY WITH MEALS 10/31/17  Yes Enzo Duron MD   LEVOXYL 88 mcg tablet Take 1 Tab by mouth Daily (before breakfast). BRAND MEDICALLY NECESSARY 9/5/17  Yes Marcos Burgos MD   multivits,Stress Formula-Zinc tablet Take 1 Tab by mouth daily. Yes Historical Provider   TURMERIC ROOT EXTRACT PO Take  by mouth. Yes Historical Provider   black cohosh 540 mg cap Take  by mouth. Yes Historical Provider   Biotin 2,500 mcg cap Take 1,500 mcg by mouth. Yes Moraima Apodaca MD   cholecalciferol, vitamin D3, 2,000 unit tab Take 2,000 Units by mouth daily.    Yes Historical Provider   magnesium oxide (MAG-OX) 400 mg tablet Take 400 mg by mouth daily. Yes Historical Provider   VITAMIN E, DL,TOCOPHERYL ACET, (VITAMIN E ACETATE) 400 unit cap capsule Take  by mouth daily. Yes Historical Provider   phenazopyridine (PYRIDIUM) 200 mg tablet Take 1 Tab by mouth two (2) times a day for 5 days. 12/1/17 12/6/17  Matt Lopez MD   Blood-Glucose Meter monitoring kit Check BG 2-3 times/day 9/28/17   Juaquin White MD   glucose blood VI test strips (BLOOD GLUCOSE TEST) strip Check BG 2-3 times/day 9/28/17   Juaquin White MD   oxyCODONE IR (ROXICODONE) 5 mg immediate release tablet Take 1 Tab by mouth every four (4) hours as needed for Pain. Max Daily Amount: 30 mg. 2/9/17   Alin Calderon MD   Mv,Ca,Min-FA-Herbal No.157 400 mcg tab Take  by mouth. Moraima Apodaca MD         Patient Active Problem List   Diagnosis Code    Hypothyroidism E03.9    Allergic rhinitis J30.9    Microscopic hematuria R31.29    Dyslipidemia E78.5    Dysuria R30.0    Prediabetes R73.03    Thyroid nodule E04.1    Hypertriglyceridemia E78.1    Neuropathic pain of both legs G57.91, G57.92         Review of Systems   Constitutional: Negative. Genitourinary: Positive for flank pain. Visit Vitals    BP 90/68 (BP 1 Location: Right arm, BP Patient Position: Sitting)    Pulse 77    Temp 98.6 °F (37 °C) (Oral)    Resp 19    Ht 5' 9\" (1.753 m)    Wt 158 lb (71.7 kg)    SpO2 99%    BMI 23.33 kg/m2       Physical Exam   Constitutional: No distress. Abdominal:   No tenderness to palpation at the CVA on the right. ASSESSMENT/PLAN  Diagnoses and all orders for this visit:    1. Dysuria  -     AMB POC URINALYSIS DIP STICK AUTO W/O MICRO  -     CULTURE, URINE    2. Right flank pain  -     nitrofurantoin, macrocrystal-monohydrate, (MACROBID) 100 mg capsule;  Take 1 Cap by mouth two (2) times a day for 5 days.  -     US RETROPERITONEUM COMP; Future         Advised the patient to call back or return to office if symptoms worsen/change/persist.   Discussed expected course/resolution/complications of diagnosis in detail with patient. Medication risks/benefits/costs/interactions/alternatives discussed with patient. The patient was given an after visit summary which includes diagnoses, current medications, & vitals. They expressed understanding with the diagnosis and plan.

## 2017-12-08 LAB — BACTERIA UR CULT: NORMAL

## 2017-12-08 NOTE — TELEPHONE ENCOUNTER
If patient still symptomatic she needs to be re-evaluated. Treatment with cipro for uncomplicated cystitis is for 3 days so she received more than that and her culture was sensitive. Please call and advise. Thanks!

## 2017-12-08 NOTE — TELEPHONE ENCOUNTER
Patient was called as follow up per Dr. Nereida Macdonald to see if she was symptomatic. She states she was having flank pain and has seen her PCP and is being treated with Macrobid while awaiting another urine culture. She verbalized understanding of reason for call.   Ashley Rainey LPN

## 2018-01-08 ENCOUNTER — TELEPHONE (OUTPATIENT)
Dept: INTERNAL MEDICINE CLINIC | Age: 53
End: 2018-01-08

## 2018-01-08 ENCOUNTER — DOCUMENTATION ONLY (OUTPATIENT)
Dept: INTERNAL MEDICINE CLINIC | Age: 53
End: 2018-01-08

## 2018-01-08 NOTE — PROGRESS NOTES
Request received from [facility] # [phone] fax # [phone] request for Urine CX , faxed per request

## 2018-01-19 RX ORDER — METFORMIN HYDROCHLORIDE 500 MG/1
TABLET ORAL
Qty: 180 TAB | Refills: 3 | Status: SHIPPED | OUTPATIENT
Start: 2018-01-19 | End: 2018-01-22 | Stop reason: SDUPTHER

## 2018-01-22 RX ORDER — METFORMIN HYDROCHLORIDE 500 MG/1
TABLET ORAL
Qty: 180 TAB | Refills: 1 | Status: SHIPPED | OUTPATIENT
Start: 2018-01-22 | End: 2018-08-24 | Stop reason: SDUPTHER

## 2018-02-03 LAB
ALBUMIN SERPL-MCNC: 4.2 G/DL (ref 3.5–5.5)
ALBUMIN/GLOB SERPL: 1.5 {RATIO} (ref 1.2–2.2)
ALP SERPL-CCNC: 54 IU/L (ref 39–117)
ALT SERPL-CCNC: 25 IU/L (ref 0–32)
AST SERPL-CCNC: 25 IU/L (ref 0–40)
BILIRUB SERPL-MCNC: 0.4 MG/DL (ref 0–1.2)
BUN SERPL-MCNC: 15 MG/DL (ref 6–24)
BUN/CREAT SERPL: 18 (ref 9–23)
CALCIUM SERPL-MCNC: 9.1 MG/DL (ref 8.7–10.2)
CHLORIDE SERPL-SCNC: 101 MMOL/L (ref 96–106)
CHOLEST SERPL-MCNC: 221 MG/DL (ref 100–199)
CO2 SERPL-SCNC: 22 MMOL/L (ref 18–29)
CREAT SERPL-MCNC: 0.85 MG/DL (ref 0.57–1)
EST. AVERAGE GLUCOSE BLD GHB EST-MCNC: 108 MG/DL
FOLATE SERPL-MCNC: >20 NG/ML
GFR SERPLBLD CREATININE-BSD FMLA CKD-EPI: 78 ML/MIN/1.73
GFR SERPLBLD CREATININE-BSD FMLA CKD-EPI: 90 ML/MIN/1.73
GLOBULIN SER CALC-MCNC: 2.8 G/DL (ref 1.5–4.5)
GLUCOSE SERPL-MCNC: 85 MG/DL (ref 65–99)
HBA1C MFR BLD: 5.4 % (ref 4.8–5.6)
HDLC SERPL-MCNC: 37 MG/DL
LDLC SERPL CALC-MCNC: 135 MG/DL (ref 0–99)
POTASSIUM SERPL-SCNC: 4.3 MMOL/L (ref 3.5–5.2)
PROT SERPL-MCNC: 7 G/DL (ref 6–8.5)
SODIUM SERPL-SCNC: 139 MMOL/L (ref 134–144)
T4 FREE SERPL-MCNC: 1.38 NG/DL (ref 0.82–1.77)
TRIGL SERPL-MCNC: 247 MG/DL (ref 0–149)
TSH SERPL DL<=0.005 MIU/L-ACNC: 1.11 UIU/ML (ref 0.45–4.5)
VIT B12 SERPL-MCNC: 851 PG/ML (ref 232–1245)
VLDLC SERPL CALC-MCNC: 49 MG/DL (ref 5–40)

## 2018-02-06 ENCOUNTER — OFFICE VISIT (OUTPATIENT)
Dept: ENDOCRINOLOGY | Age: 53
End: 2018-02-06

## 2018-02-06 VITALS — BODY MASS INDEX: 23.99 KG/M2 | WEIGHT: 162 LBS | HEIGHT: 69 IN | OXYGEN SATURATION: 98 % | RESPIRATION RATE: 16 BRPM

## 2018-02-06 DIAGNOSIS — R73.03 PREDIABETES: ICD-10-CM

## 2018-02-06 DIAGNOSIS — G57.93 NEUROPATHIC PAIN OF BOTH LEGS: ICD-10-CM

## 2018-02-06 DIAGNOSIS — E04.1 THYROID NODULE: ICD-10-CM

## 2018-02-06 DIAGNOSIS — E78.1 HYPERTRIGLYCERIDEMIA: ICD-10-CM

## 2018-02-06 DIAGNOSIS — E03.9 ACQUIRED HYPOTHYROIDISM: Primary | ICD-10-CM

## 2018-02-06 RX ORDER — POLYETHYLENE GLYCOL-3350, SODIUM CHLORIDE, POTASSIUM CHLORIDE AND SODIUM BICARBONATE 420; 11.2; 5.72; 1.48 G/438.4G; G/438.4G; G/438.4G; G/438.4G
POWDER, FOR SOLUTION ORAL
Refills: 0 | COMMUNITY
Start: 2017-12-15 | End: 2018-03-21 | Stop reason: ALTCHOICE

## 2018-02-06 RX ORDER — NITROFURANTOIN 25; 75 MG/1; MG/1
CAPSULE ORAL
Refills: 3 | COMMUNITY
Start: 2018-01-04 | End: 2018-09-12 | Stop reason: ALTCHOICE

## 2018-02-06 RX ORDER — CEPHALEXIN 250 MG/1
CAPSULE ORAL
Refills: 4 | COMMUNITY
Start: 2018-01-04 | End: 2018-09-12 | Stop reason: ALTCHOICE

## 2018-02-06 RX ORDER — ESTRADIOL 0.1 MG/G
CREAM VAGINAL
Refills: 4 | COMMUNITY
Start: 2018-01-06 | End: 2021-07-12

## 2018-02-06 NOTE — PATIENT INSTRUCTIONS
Cholesterol: try red yeast rice    Thyroid: take Levoxyl 88 mcg every day    Sugar/triglycerides: increase metformin to 500mg twice a day (with meals)

## 2018-02-06 NOTE — PROGRESS NOTES
Endocrinology Visit    CC: hypothyroidism    History of present illness:  Emiliano Junior is a pleasant 45 y.o. female here for f/u of hypothyroidism and prediabetes. She was previously followed by endocrinologist Dr Naldo Fowler. I saw her in initial consultation in January 2017 at which time TSH was slightly low on Levoxyl 88 mcg daily so I advised her to continue this dose but take 1/2 tablet one day/week. I also ordered a thyroid ultrasound which showed a small 1.3cm isoechoic nodule. Due to her family history of PTC, I recommended that she have an FNA but we discussed this further and she decided to wait and do f/u imaging instead. At her f/u visit in May, I started her on metformin due to h/o prediabetes and recent development of hypertriglyceridemia (TGs on recent labs returned very elevated >400, however repeat was 232 on 5/20/17). In the interim, she reports not being able to tolerate the metformin XR, but is doing well with regular metformin 500mg daily. Feels this has helped decrease the neuropathy symptoms and swelling in her feet. Most recent labs showed A1c at 5.4%, down from 5.8% in Jan. She does have a family history of diabetes. She has tried to reduce her bread and sweets intake. Only drinks water, eliminated sodas. She cut down on her milk and cheese intake over the past 6 months, feels this initially helped her lose weight but she regained some over the holidays. She is not taking a statin or other cholesterol medications. Reports being unable to tolerate both fish oil and flaxseed oil in the past.     She was diagnosed with hypothyroidism over 15 years ago. She was initially treated with levothyroxine but later switched to Saunemin thyroid because it was more natural. She currently takes brand name Levoxyl (levothyroxine) 88 mcg daily, except she takes 44 mcg daily 1d/wk. She  takes this correctly on an empty stomach in the morning.  Since switching back to levothyroxine she reports feeling much better - hair loss seems to have resolved and her energy level is better. She denies racing heart, tremor, palpitations, heat or cold intolerance, constipation, diarrhea or energy changes. Had some swelling in her neck which she feels is resolved now.     ROS: see HPI for pertinent positives and negatives, otherwise a 7 system review is negative    Problem list:  Patient Active Problem List   Diagnosis Code    Hypothyroidism E03.9    Allergic rhinitis J30.9    Microscopic hematuria R31.29    Dyslipidemia E78.5    Dysuria R30.0    Prediabetes R73.03    Thyroid nodule E04.1    Hypertriglyceridemia E78.1    Neuropathic pain of both legs G57.91, G57.92       Medical history:  Past Medical History:   Diagnosis Date    Adverse effect of anesthesia     anxiety with waking up    Asthma     hx of asthma r/t allergies/emotional response    Diabetes ([facility] [address]     pre diabetic/ no meds    Hx of mammogram 10/2/12    Nausea & vomiting     Nephrolithiasis     right kidney stone    Other ill-defined conditions(799.89)     echocardiogram,12/2010,  has murmur    Thyroid disease     enlarged & inflamed thryoid/ hypo        Past surgical history:  Past Surgical History:   Procedure Laterality Date    ABDOMEN SURGERY PROC UNLISTED      laparoscopy to check for endometriosis    HX BUNIONECTOMY  2001    ,right bunion    HX HERNIA REPAIR      umbilical    HX HYSTERECTOMY  2007    laparoscopic hysterecomy, rectocele repair, umbilical hernia repair, sling    HX ORTHOPAEDIC Right 2001    foot-bunion with hardware    HX RECTOCELE REPAIR  2007    HX TONSILLECTOMY  age 8   [de-identified] UROLOGICAL  2007    urethral sling       Medications:    Current Outpatient Prescriptions:     GAVILYTE-N 420 gram solution, USE AS DIRECTED, Disp: , Rfl: 0    ESTRACE 0.01 % (0.1 mg/gram) vaginal cream, APPLY 1GM TO VAGINAL AREA EVERY DAY FOR 2 WKS AND THEN 2-3 X WEEKLY THEREAFTER, Disp: , Rfl: 4    nitrofurantoin, macrocrystal-monohydrate, (MACROBID) 100 mg capsule, TAKE 1 TABLET BY MOUTH EVERY DAY, TAKE ON EVEN NUMBERED MONTHS, Disp: , Rfl: 3    cephALEXin (KEFLEX) 250 mg capsule, TAKE 1 CAPSULE BY MOUTH EVERY DAY-TAKE ON ODD NUMBERED MONTHS, Disp: , Rfl: 4    metFORMIN (GLUCOPHAGE) 500 mg tablet, TAKE ONE TABLET BY MOUTH 2 TIMES A DAY WITH MEALS, Disp: 180 Tab, Rfl: 1    pumpkin seed extract-soy germ (AZO BLADDER CONTROL) 300 mg cap, Take  by mouth., Disp: , Rfl:     Blood-Glucose Meter monitoring kit, Check BG 2-3 times/day, Disp: 1 Kit, Rfl: 0    glucose blood VI test strips (BLOOD GLUCOSE TEST) strip, Check BG 2-3 times/day, Disp: 100 Strip, Rfl: 11    LEVOXYL 88 mcg tablet, Take 1 Tab by mouth Daily (before breakfast). BRAND MEDICALLY NECESSARY, Disp: 90 Tab, Rfl: 1    multivits,Stress Formula-Zinc tablet, Take 1 Tab by mouth daily. , Disp: , Rfl:     oxyCODONE IR (ROXICODONE) 5 mg immediate release tablet, Take 1 Tab by mouth every four (4) hours as needed for Pain. Max Daily Amount: 30 mg., Disp: 40 Tab, Rfl: 0    TURMERIC ROOT EXTRACT PO, Take  by mouth., Disp: , Rfl:     black cohosh 540 mg cap, Take  by mouth., Disp: , Rfl:     Biotin 2,500 mcg cap, Take 1,500 mcg by mouth., Disp: , Rfl:     Mv,Ca,Min-FA-Herbal No.157 400 mcg tab, Take  by mouth., Disp: , Rfl:     cholecalciferol, vitamin D3, 2,000 unit tab, Take 2,000 Units by mouth daily. , Disp: , Rfl:     magnesium oxide (MAG-OX) 400 mg tablet, Take 400 mg by mouth daily. , Disp: , Rfl:     VITAMIN E, DL,TOCOPHERYL ACET, (VITAMIN E ACETATE) 400 unit cap capsule, Take  by mouth daily. , Disp: , Rfl:     Allergies:   Allergies   Allergen Reactions    Pcn [Penicillins] Nausea Only     Orally only,can take intravenously       Social History:  Social History     Social History    Marital status:      Spouse name:     Number of children: 3    Years of education: N/A     Occupational History    Mammographer-Kahlil Gonzalez     Social History Main Topics    Smoking status: Former Smoker     Packs/day: 1.00     Years: 10.00     Types: Cigarettes     Quit date: 7/13/1989    Smokeless tobacco: Never Used    Alcohol use 1.0 oz/week     2 Glasses of wine per week      Comment: occasional    Drug use: No    Sexual activity: Not Currently     Other Topics Concern    Exercise No     runs regularly     Social History Narrative    One son, two daughters       Family History:  Family History   Problem Relation Age of Onset    Thyroid Disease Father     Cancer Father      CML    Thyroid Disease Brother     Thyroid Cancer Brother     Heart Disease Maternal Grandfather     Heart Disease Paternal Grandfather        Physical Exam:  Visit Vitals    Ht 5' 9\" (1.753 m)    Wt 162 lb (73.5 kg)    BMI 23.92 kg/m2     Gen: NAD  Heent: mucous membranes moist, no lid lag, stare or exophthalmos. No scleral or conjunctival injection. Extra ocular muscles intact . Thyroid: moves well with swallowing, normal size, normal texture, soft and mobile 1cm nodule palpated in left upper lobe  Pulmonary: clear to auscultation bilaterally, no wheezes/rhonchi or rales  Cardiovascular: regular rate and rhythm, no murmurs, rubs or gallops, good distal pulses  Extremities: no clubbing, cyanosis or edema. No onocholysis   Neuro: no tremor of outstretched hands, reflexes are normal with normal relaxation phase. Normal gait, normal concentration  Skin: normal texture, warm and dry   Psyche: normal affect with good insight into her medical conditions      Pertinent lab review: Pertinent lab results from [facility] are transcribed and scanned into the medical record.     Lipids 5/19/17: total chol 202, HDL 41, ,     Lab Results   Component Value Date/Time    Cholesterol, total 221 02/02/2018 08:28 AM    HDL Cholesterol 37 02/02/2018 08:28 AM    LDL, calculated 135 02/02/2018 08:28 AM    VLDL, calculated 49 02/02/2018 08:28 AM    Triglyceride 247 02/02/2018 08:28 AM         January 2017  May 2017  A1c 5. 8%   5.5%  TSH   0.37   0.43  FT4   1.4   1.1  FT3   2.7    Component      Latest Ref Rng & Units 2/2/2018           8:27 AM   TSH      0.450 - 4.500 uIU/mL 1.110   T4, Free      0.82 - 1.77 ng/dL 1.38       Ultrasound Jan 2017  INDICATION: hypothyroidism with family history of PTC in her brother, please  evaluate for thyroid nodules      Grayscale and color Doppler ultrasound of the thyroid.     There is no prior study for direct comparison.     The right thyroid lobe measures 5.6 cm x 2.7 cm x 2.4 cm. The left thyroid lobe  measures 5.9 cm x 2.4 cm x 1.9 cm. The thyroid isthmus measures 6 mm AP  dimension. The thyroid gland inhomogeneous and hypervascular. There is a 1.5 cm  x 7 mm x 1.2 cm nodule within the superior pole of the left thyroid lobe.      IMPRESSION: Mildly enlarged, inhomogeneous, hypervascular thyroid gland. An 0.5  cm x 7 mm x 1.2 cm nodule within the superior pole of the left thyroid lobe. Assessment and plan:  Edin Jacinto is a pleasant 45 y.o. female here for hypothyroidism, HLD, and prediabetes. 1. Acquired hypothyroidism: Etiology unknown, suspect due to Hashimoto's given the appearance of her gland on US. She clinically appears euthyroid by exam on current dose of levothyroxine 88 mcg daily, except 44 mcg daily 1d/wk. Since TSH has increased slightly, will have her resume the 88 mcg daily. Repeat TSH/FT4 with labs in 6 months. 2. Prediabetes: A1c was 5.8% in January 2017, decreased to 5.4% on low-dose metformin therapy. Pt feels symptomatically improved on metformin - will increase to 500mg BID in an effort to help reduce TGs further. Recheck A1c before next visit in 6 months. 3. Hypertriglyceridemia: increase metformin and start red yeast rice since LGL has increased. Also advised a low-carb diet + exercise. If TGs remain elevated despite these therapies, we discussed possibly starting fenofibrate. Repeat fasting lipids in 6 months.       4. Thyroid nodule: she has an isoechoic 1.3cm left sided nodule without suspicious features. We discussed possible FNA but have decided to do f/u ultrasonography instead given its benign appearance. Clinical neck exam is stable. Plan to repeat US next year. 5. Neuropathic pain of both legs: unknown etiology, but improved since starting metformin. B12 and folate are normal. Continue to monitor. I spent 25 minutes with the patient today and > 50% of the time was spent counseling the patient about hypothyroidism: its etiology, clinical manifestations, diagnosis, and management. She will return in 6 months time. Thank you for the opportunity to partake in this patients care.   Betzy Damian MD  [facility]  [facility]

## 2018-02-06 NOTE — MR AVS SNAPSHOT
[address] 
[phone] Patient: Kelli Pop MRN: [id number] SJM:7/99/1112 Visit Information Date & Time Provider Department Dept. Phone Encounter #  
 2/6/2018  8:30 AM Lena Chance, [facility] [phone] [id number] Upcoming Health Maintenance Date Due FOBT Q 1 YEAR AGE 50-75 3/30/2013 BREAST CANCER SCRN MAMMOGRAM 4/13/2019 PAP AKA CERVICAL CYTOLOGY 4/25/2022 DTaP/Tdap/Td series (2 - Td) 11/28/2026 Allergies as of 2/6/2018  Review Complete On: 2/6/2018 By: Clarisa Lopes Severity Noted Reaction Type Reactions Pcn [Penicillins] Low 07/13/2011   Side Effect Nausea Only Orally only,can take intravenously Current Immunizations  Reviewed on 10/11/2017 Name Date Influenza Vaccine (Quad) PF 10/11/2017 Td 6/1/2011 Tdap 11/28/2016 Not reviewed this visit You Were Diagnosed With   
  
 Codes Comments Acquired hypothyroidism    -  Primary ICD-10-CM: E03.9 ICD-9-CM: 244.9 Hypertriglyceridemia     ICD-10-CM: E78.1 ICD-9-CM: 272.1 Prediabetes     ICD-10-CM: R73.03 
ICD-9-CM: 790.29 Vitals Resp Height(growth percentile) Weight(growth percentile) SpO2 BMI OB Status 16 5' 9\" (1.753 m) 162 lb (73.5 kg) 98% 23.92 kg/m2 Hysterectomy Smoking Status Former Smoker Vitals History BMI and BSA Data Body Mass Index Body Surface Area  
 23.92 kg/m 2 1.89 m 2 Preferred Pharmacy Pharmacy Name Phone 4 [facility], [address] [place] [address] , [address] [phone] Your Updated Medication List  
  
   
This list is accurate as of: 2/6/18  9:04 AM.  Always use your most recent med list.  
  
  
  
  
 AZO BLADDER CONTROL 300 mg Cap Generic drug:  pumpkin seed extract-soy germ Take  by mouth. Biotin 2,500 mcg Cap Take 1,500 mcg by mouth. cephALEXin 250 mg capsule Commonly known as:  Lolledilia Hayward TAKE 1 CAPSULE BY MOUTH EVERY DAY-TAKE ON ODD NUMBERED MONTHS  
  
 cholecalciferol (vitamin D3) 2,000 unit Tab Take 2,000 Units by mouth daily. ESTRACE 0.01 % (0.1 mg/gram) vaginal cream  
Generic drug:  estradiol APPLY 1GM TO VAGINAL AREA EVERY DAY FOR 2 WKS AND THEN 2-3 X WEEKLY THEREAFTER  
  
 GAVILYTE-N 420 gram solution Generic drug:  peg-electrolyte soln USE AS DIRECTED  
  
 glucose blood VI test strips strip Commonly known as:  blood glucose test  
Check BG 2-3 times/day LEVOXYL 88 mcg tablet Generic drug:  levothyroxine Take 1 Tab by mouth Daily (before breakfast). BRAND MEDICALLY NECESSARY  
  
 magnesium oxide 400 mg tablet Commonly known as:  MAG-OX Take 400 mg by mouth daily. metFORMIN 500 mg tablet Commonly known as:  GLUCOPHAGE  
TAKE ONE TABLET BY MOUTH 2 TIMES A DAY WITH MEALS  
  
 multivits,Stress Formula-Zinc tablet Take 1 Tab by mouth daily. Mv,Ca,Min-FA-Herbal No.157 400 mcg Tab Take  by mouth. nitrofurantoin (macrocrystal-monohydrate) 100 mg capsule Commonly known as:  MACROBID  
TAKE 1 TABLET BY MOUTH EVERY DAY, TAKE ON EVEN NUMBERED MONTHS TURMERIC ROOT EXTRACT PO Take  by mouth.  
  
 vitamin E acetate 400 unit Cap capsule Take  by mouth daily. To-Do List   
 02/21/2018 5:45 PM  
  Appointment with MASSAGE-ROGERIO at [address] OP PT ([phone])  
  
 03/21/2018 5:45 PM  
  Appointment with [facility] at hospitals OP PT ([phone]) Around 08/06/2018 Lab:  HEMOGLOBIN A1C WITH EAG Around 08/06/2018 Lab:  LIPID PANEL Around 08/06/2018 Lab:  TSH AND FREE T4 Patient Instructions Cholesterol: try red yeast rice Thyroid: take Levoxyl 88 mcg every day Sugar/triglycerides: increase metformin to 500mg twice a day (with meals) Introducing [facility] & HEALTH SERVICES! Dear Cindy Mendoza: Thank you for requesting a MocoSpace account. Our records indicate that you already have an active MocoSpace account. You can access your account anytime at [email or web address] Social Did you know that you can access your hospital and ER discharge instructions at any time in MocoSpace? You can also review all of your test results from your hospital stay or ER visit. Additional Information If you have questions, please visit the Frequently Asked Questions section of the MocoSpace website at [email or web address] Remember, MocoSpace is NOT to be used for urgent needs. For medical emergencies, dial 911. Now available from your iPhone and Android! Please provide this summary of care documentation to your next provider. Your primary care clinician is listed as Darrick Plummer. If you have any questions after today's visit, please call [phone].

## 2018-02-23 RX ORDER — CETIRIZINE HCL 10 MG
10 TABLET ORAL DAILY
COMMUNITY
End: 2018-08-07 | Stop reason: SDUPTHER

## 2018-02-26 ENCOUNTER — ANESTHESIA EVENT (OUTPATIENT)
Dept: ENDOSCOPY | Age: 53
End: 2018-02-26
Payer: COMMERCIAL

## 2018-02-26 ENCOUNTER — HOSPITAL ENCOUNTER (OUTPATIENT)
Age: 53
Setting detail: OUTPATIENT SURGERY
Discharge: HOME | End: 2018-02-26
Attending: INTERNAL MEDICINE | Admitting: INTERNAL MEDICINE
Payer: COMMERCIAL

## 2018-02-26 ENCOUNTER — ANESTHESIA (OUTPATIENT)
Dept: ENDOSCOPY | Age: 53
End: 2018-02-26
Payer: COMMERCIAL

## 2018-02-26 VITALS
DIASTOLIC BLOOD PRESSURE: 63 MMHG | HEART RATE: 64 BPM | OXYGEN SATURATION: 100 % | RESPIRATION RATE: 14 BRPM | BODY MASS INDEX: 23.29 KG/M2 | HEIGHT: 69 IN | SYSTOLIC BLOOD PRESSURE: 117 MMHG | WEIGHT: 157.25 LBS | TEMPERATURE: 97.6 F

## 2018-02-26 PROCEDURE — 74011250636 HC RX REV CODE- 250/636: Performed by: INTERNAL MEDICINE

## 2018-02-26 PROCEDURE — 76060000031 HC ANESTHESIA FIRST 0.5 HR: Performed by: INTERNAL MEDICINE

## 2018-02-26 PROCEDURE — 74011000250 HC RX REV CODE- 250

## 2018-02-26 PROCEDURE — 74011250636 HC RX REV CODE- 250/636

## 2018-02-26 PROCEDURE — 76040000019: Performed by: INTERNAL MEDICINE

## 2018-02-26 RX ORDER — LIDOCAINE HYDROCHLORIDE 20 MG/ML
INJECTION, SOLUTION EPIDURAL; INFILTRATION; INTRACAUDAL; PERINEURAL AS NEEDED
Status: DISCONTINUED | OUTPATIENT
Start: 2018-02-26 | End: 2018-02-26 | Stop reason: HOSPADM

## 2018-02-26 RX ORDER — SODIUM CHLORIDE 0.9 % (FLUSH) 0.9 %
5-10 SYRINGE (ML) INJECTION EVERY 8 HOURS
Status: DISCONTINUED | OUTPATIENT
Start: 2018-02-26 | End: 2018-02-26 | Stop reason: HOSPADM

## 2018-02-26 RX ORDER — MIDAZOLAM HYDROCHLORIDE 1 MG/ML
.25-5 INJECTION, SOLUTION INTRAMUSCULAR; INTRAVENOUS
Status: ACTIVE | OUTPATIENT
Start: 2018-02-26 | End: 2018-02-26

## 2018-02-26 RX ORDER — PHENYLEPHRINE HCL IN 0.9% NACL 0.4MG/10ML
SYRINGE (ML) INTRAVENOUS AS NEEDED
Status: DISCONTINUED | OUTPATIENT
Start: 2018-02-26 | End: 2018-02-26 | Stop reason: HOSPADM

## 2018-02-26 RX ORDER — SODIUM CHLORIDE, SODIUM LACTATE, POTASSIUM CHLORIDE, CALCIUM CHLORIDE 600; 310; 30; 20 MG/100ML; MG/100ML; MG/100ML; MG/100ML
150 INJECTION, SOLUTION INTRAVENOUS CONTINUOUS
Status: DISCONTINUED | OUTPATIENT
Start: 2018-02-26 | End: 2018-02-26 | Stop reason: HOSPADM

## 2018-02-26 RX ORDER — DEXTROMETHORPHAN/PSEUDOEPHED 2.5-7.5/.8
1.2 DROPS ORAL
Status: DISCONTINUED | OUTPATIENT
Start: 2018-02-26 | End: 2018-02-26 | Stop reason: HOSPADM

## 2018-02-26 RX ORDER — SODIUM CHLORIDE 0.9 % (FLUSH) 0.9 %
5-10 SYRINGE (ML) INJECTION AS NEEDED
Status: ACTIVE | OUTPATIENT
Start: 2018-02-26 | End: 2018-02-26

## 2018-02-26 RX ORDER — FLUMAZENIL 0.1 MG/ML
0.2 INJECTION INTRAVENOUS
Status: ACTIVE | OUTPATIENT
Start: 2018-02-26 | End: 2018-02-26

## 2018-02-26 RX ORDER — EPINEPHRINE 0.1 MG/ML
1 INJECTION INTRACARDIAC; INTRAVENOUS
Status: ACTIVE | OUTPATIENT
Start: 2018-02-26 | End: 2018-02-26

## 2018-02-26 RX ORDER — ATROPINE SULFATE 0.1 MG/ML
0.5 INJECTION INTRAVENOUS
Status: ACTIVE | OUTPATIENT
Start: 2018-02-26 | End: 2018-02-26

## 2018-02-26 RX ORDER — NALOXONE HYDROCHLORIDE 0.4 MG/ML
0.4 INJECTION, SOLUTION INTRAMUSCULAR; INTRAVENOUS; SUBCUTANEOUS
Status: ACTIVE | OUTPATIENT
Start: 2018-02-26 | End: 2018-02-26

## 2018-02-26 RX ORDER — PROPOFOL 10 MG/ML
INJECTION, EMULSION INTRAVENOUS AS NEEDED
Status: DISCONTINUED | OUTPATIENT
Start: 2018-02-26 | End: 2018-02-26 | Stop reason: HOSPADM

## 2018-02-26 RX ORDER — FENTANYL CITRATE 50 UG/ML
25 INJECTION, SOLUTION INTRAMUSCULAR; INTRAVENOUS
Status: ACTIVE | OUTPATIENT
Start: 2018-02-26 | End: 2018-02-26

## 2018-02-26 RX ORDER — SODIUM CHLORIDE 9 MG/ML
75 INJECTION, SOLUTION INTRAVENOUS CONTINUOUS
Status: DISPENSED | OUTPATIENT
Start: 2018-02-26 | End: 2018-02-26

## 2018-02-26 RX ADMIN — SODIUM CHLORIDE, SODIUM LACTATE, POTASSIUM CHLORIDE, AND CALCIUM CHLORIDE 150 ML/HR: 600; 310; 30; 20 INJECTION, SOLUTION INTRAVENOUS at 07:28

## 2018-02-26 RX ADMIN — LIDOCAINE HYDROCHLORIDE 50 MG: 20 INJECTION, SOLUTION EPIDURAL; INFILTRATION; INTRACAUDAL; PERINEURAL at 07:51

## 2018-02-26 RX ADMIN — SODIUM CHLORIDE: 900 INJECTION, SOLUTION INTRAVENOUS at 07:30

## 2018-02-26 RX ADMIN — PROPOFOL 50 MG: 10 INJECTION, EMULSION INTRAVENOUS at 07:58

## 2018-02-26 RX ADMIN — PROPOFOL 100 MG: 10 INJECTION, EMULSION INTRAVENOUS at 07:51

## 2018-02-26 RX ADMIN — PROPOFOL 50 MG: 10 INJECTION, EMULSION INTRAVENOUS at 07:54

## 2018-02-26 RX ADMIN — Medication 80 MCG: at 08:07

## 2018-02-26 RX ADMIN — PROPOFOL 20 MG: 10 INJECTION, EMULSION INTRAVENOUS at 08:01

## 2018-02-26 RX ADMIN — Medication 80 MCG: at 08:06

## 2018-02-26 NOTE — IP AVS SNAPSHOT
[address] [facility] [address]. 
[phone] Patient: Ami Beck MRN: [id number] ECS:7/54/9969 About your hospitalization You were admitted on:  February 26, 2018 You last received care in the:  [facility] You were discharged on:  February 26, 2018 Why you were hospitalized Your primary diagnosis was:  Not on File Follow-up Information None Your Scheduled Appointments Friday March 09, 2018  7:30 AM EST  
MASSAGE 58 with MASSAGE-ROGERIO  
[facility] ([address]) [address] [facility] [address] [address]  
[phone] Wednesday March 21, 2018  5:45 PM EDT  
MASSAGE 58 with MASSAGE-ROGERIO  
[facility] ([address]) [address] [facility] [address] [address]  
[phone] Discharge Orders None A check nazanin indicates which time of day the medication should be taken. My Medications CONTINUE taking these medications Instructions Each Dose to Equal  
 Morning Noon Evening Bedtime Biotin 2,500 mcg Cap Your last dose was: Your next dose is: Take 5,000 mcg by mouth daily. 5000 mcg  
    
   
   
   
  
 cephALEXin 250 mg capsule Commonly known as:  Bebe Ally Your last dose was: Your next dose is: TAKE 1 CAPSULE BY MOUTH EVERY DAY-TAKE ON ODD NUMBERED MONTHS  
     
   
   
   
  
 cholecalciferol (vitamin D3) 2,000 unit Tab Your last dose was: Your next dose is: Take 2,000 Units by mouth daily. 2000 Units ESTRACE 0.01 % (0.1 mg/gram) vaginal cream  
Generic drug:  estradiol Your last dose was: Your next dose is:    
   
   
 APPLY 1GM TO VAGINAL AREA EVERY DAY FOR 2 WKS AND THEN 2-3 X WEEKLY THEREAFTER  
     
   
   
   
  
 GAVILYTE-N 420 gram solution Generic drug:  peg-electrolyte soln Your last dose was: Your next dose is: USE AS DIRECTED  
     
   
   
   
  
 glucose blood VI test strips strip Commonly known as:  blood glucose test  
   
Your last dose was: Your next dose is:    
   
   
 Check BG 2-3 times/day LEVOXYL 88 mcg tablet Generic drug:  levothyroxine Your last dose was: Your next dose is: Take 1 Tab by mouth Daily (before breakfast). BRAND MEDICALLY NECESSARY 88 mcg  
    
   
   
   
  
 magnesium oxide 400 mg tablet Commonly known as:  MAG-OX Your last dose was: Your next dose is: Take 400 mg by mouth daily. 400 mg  
    
   
   
   
  
 metFORMIN 500 mg tablet Commonly known as:  GLUCOPHAGE Your last dose was: Your next dose is: TAKE ONE TABLET BY MOUTH 2 TIMES A DAY WITH MEALS  
     
   
   
   
  
 nitrofurantoin (macrocrystal-monohydrate) 100 mg capsule Commonly known as:  MACROBID Your last dose was: Your next dose is: TAKE 1 TABLET BY MOUTH EVERY DAY, TAKE ON EVEN NUMBERED MONTHS TURMERIC ROOT EXTRACT PO Your last dose was: Your next dose is: Take  by mouth.  
     
   
   
   
  
 vitamin E acetate 400 unit Cap capsule Your last dose was: Your next dose is: Take  by mouth daily. zinc 50 mg Tab tablet Your last dose was: Your next dose is: Take  by mouth daily. ZyrTEC 10 mg tablet Generic drug:  cetirizine Your last dose was: Your next dose is: Take 10 mg by mouth daily. 10 mg Discharge Instructions Buffy Clark 
[id number] 
1965 COLON DISCHARGE INSTRUCTIONS Discomfort: Redness at IV site- apply warm compress to area; if redness or soreness persist- contact your physician There may be a slight amount of blood passed from the rectum Gaseous discomfort- walking, belching will help relieve any discomfort You may not operate a vehicle for 12 hours You may not engage in an occupation involving machinery or appliances for rest of today You may not drink alcoholic beverages for at least 12 hours Avoid making any critical decisions for at least 24 hour DIET: 
 High fiber diet.  however -  remember your colon is empty and a heavy meal will produce gas. Avoid these foods:  vegetables, fried / greasy foods, carbonated drinks for today MEDICATION: 
 
 
  
ACTIVITY: 
You may not resume your normal daily activities until tomorrow AM; it is recommended that you spend the remainder of the day resting -  avoid any strenuous activity. CALL M.D. ANY SIGN OF: Increasing pain, nausea, vomiting Abdominal distension (swelling) New increased bleeding (oral or rectal) Fever (chills) Pain in chest area Bloody discharge from nose or mouth Shortness of breath IMPRESSION: 
-Scattered sigmoid diverticulosis 
-No masses, polyps, or inflammation are identified Follow-up Instructions: 
 Call Dr. Gabriel Madrigal if questions arise regarding your procedure Telephone # [phone] Repeat colonoscopy in 5 years Kassie Richardson MD 
 
mygola Activation Thank you for requesting access to mygola. Please follow the instructions below to securely access and download your online medical record. mygola allows you to send messages to your doctor, view your test results, renew your prescriptions, schedule appointments, and more. How Do I Sign Up? 1. In your internet browser, go to [email or web address] 
2. Click on the First Time User? Click Here link in the Sign In box. You will be redirect to the New Member Sign Up page. 3. Enter your mygola Access Code exactly as it appears below.  You will not need to use this code after youve completed the sign-up process. If you do not sign up before the expiration date, you must request a new code. Rawbots Access Code: Activation code not generated Current Rawbots Status: Active (This is the date your Rawbots access code will ) 4. Enter the last four digits of your Social Security Number (xxxx) and Date of Birth (mm/dd/yyyy) as indicated and click Submit. You will be taken to the next sign-up page. 5. Create a Rawbots ID. This will be your Rawbots login ID and cannot be changed, so think of one that is secure and easy to remember. 6. Create a Rawbots password. You can change your password at any time. 7. Enter your Password Reset Question and Answer. This can be used at a later time if you forget your password. 8. Enter your e-mail address. You will receive e-mail notification when new information is available in [address] 9. Click Sign Up. You can now view and download portions of your medical record. 10. Click the Download Summary menu link to download a portable copy of your medical information. Additional Information If you have questions, please visit the Frequently Asked Questions section of the Rawbots website at [email or web address][email or web address]". [email or web address]/. Remember, Rawbots is NOT to be used for urgent needs. For medical emergencies, dial 911. Introducing \[facility]\"" & [facility] SERVICES! Dear Eddie Valdez: Thank you for requesting a Rawbots account. Our records indicate that you already have an active Rawbots account. You can access your account anytime at [email or web address], [email or web address]". YourStreet/"GenieMD, LLC" Did you know that you can access your hospital and ER discharge instructions at any time in Rawbots? You can also review all of your test results from your hospital stay or ER visit. Additional Information If you have questions, please visit the Frequently Asked Questions section of the Locappy website at [email or web address] Remember, Locappy is NOT to be used for urgent needs. For medical emergencies, dial 911. Now available from your iPhone and Android! Providers Seen During Your Hospitalization Provider Specialty Primary office phone Ivy Wallace MD Gastroenterology [phone] Your Primary Care Physician (PCP) Primary Care Physician Office Phone Office Fax Blake Tessa [phone] You are allergic to the following Allergen Reactions Pcn (Penicillins) Nausea Only Orally only,can take intravenously Recent Documentation Height Weight BMI OB Status Smoking Status 1.753 m 71.3 kg 23.22 kg/m2 Hysterectomy Former Smoker Emergency Contacts Name Discharge Info Relation Home Work Mobile Jenny Fletcher DISCHARGE CAREGIVER [3] Child [2] [phone] Kb Cruz  Other Relative [6]   [phone] Patient Belongings The following personal items are in your possession at time of discharge: 
  Dental Appliances: None  Visual Aid: Glasses Please provide this summary of care documentation to your next provider. Signatures-by signing, you are acknowledging that this After Visit Summary has been reviewed with you and you have received a copy. Patient Signature:  ____________________________________________________________ Date:  ____________________________________________________________  
  
Phyliss Ghee Provider Signature:  ____________________________________________________________ Date:  ____________________________________________________________

## 2018-02-26 NOTE — PROCEDURES
NAME:  Saran Hinton   :   1965   MRN:   [id number]     Date/Time:  2018 8:15 AM    Colonoscopy Operative Report    Procedure Type:   Colonoscopy --screening     Indications:     Family history of coloretal adenoma  (screening only)  Pre-operative Diagnosis: see indication above  Post-operative Diagnosis:  See findings below  :  Kassandra Church MD  Referring Provider: -Leoncio Mrieles MD    Exam:  Airway: clear, no airway problems anticipated  Heart: RRR, without gallops or rubs  Lungs: clear bilaterally without wheezes, crackles, or rhonchi  Abdomen: soft, nontender, nondistended, bowel sounds present  Mental Status: awake, alert and oriented to person, place and time    Sedation:  MAC anesthesia Propofol 220mg IV  Procedure Details:  After informed consent was obtained with all risks and benefits of procedure explained and preoperative exam completed, the patient was taken to the endoscopy suite and placed in the left lateral decubitus position. Upon sequential sedation as per above, a digital rectal exam was performed demonstrating no hemorrhoids. The Olympus videocolonoscope  was inserted in the rectum and carefully advanced to the cecum, which was identified by the ileocecal valve and appendiceal orifice. The quality of preparation was adequate. The colonoscope was slowly withdrawn with careful evaluation between folds. Retroflexion in the rectum was completed demonstrating no hemorrhoids. Findings:     -Scattered sigmoid diverticulosis  -No masses, polyps, or inflammation are identified    Specimen Removed:  None. Complications: None. EBL:  None. Impression:    -Scattered sigmoid diverticulosis  -No masses, polyps, or inflammation are identified    Recommendations: --Repeat colonoscopy in 5 years. High fiber diet. Resume normal medication(s). Discharge Disposition:  Home in the company of a  when able to ambulate.     Kassandra Church MD

## 2018-02-26 NOTE — DISCHARGE INSTRUCTIONS
Home Moon  [id number]  1965    COLON DISCHARGE INSTRUCTIONS  Discomfort:  Redness at IV site- apply warm compress to area; if redness or soreness persist- contact your physician  There may be a slight amount of blood passed from the rectum  Gaseous discomfort- walking, belching will help relieve any discomfort  You may not operate a vehicle for 12 hours  You may not engage in an occupation involving machinery or appliances for rest of today  You may not drink alcoholic beverages for at least 12 hours  Avoid making any critical decisions for at least 24 hour  DIET:   High fiber diet. - however -  remember your colon is empty and a heavy meal will produce gas. Avoid these foods:  vegetables, fried / greasy foods, carbonated drinks for today  MEDICATION:         ACTIVITY:  You may not resume your normal daily activities until tomorrow AM; it is recommended that you spend the remainder of the day resting -  avoid any strenuous activity. CALL M.D. ANY SIGN OF:   Increasing pain, nausea, vomiting  Abdominal distension (swelling)  New increased bleeding (oral or rectal)  Fever (chills)  Pain in chest area  Bloody discharge from nose or mouth  Shortness of breath    IMPRESSION:  -Scattered sigmoid diverticulosis  -No masses, polyps, or inflammation are identified    Follow-up Instructions:   Call Dr. Dukes if questions arise regarding your procedure  Telephone # [phone]  Repeat colonoscopy in 5 years    Donovan Quintana MD    [address] [address]    Thank you for requesting access to Slated. Please follow the instructions below to securely access and download your online medical record. Slated allows you to send messages to your doctor, view your test results, renew your prescriptions, schedule appointments, and more. How Do I Sign Up? 1. In your internet browser, go to [email or web address]  2. Click on the First Time User? Click Here link in the Sign In box.  You will be redirect to the New Member Sign Up page.  3. Enter your MYOSt Access Code exactly as it appears below. You will not need to use this code after youve completed the sign-up process. If you do not sign up before the expiration date, you must request a new code. MyChart Access Code: Activation code not generated  Current SyMynd Status: Active (This is the date your MYOSt access code will )    4. Enter the last four digits of your Social Security Number (xxxx) and Date of Birth (mm/dd/yyyy) as indicated and click Submit. You will be taken to the next sign-up page. 5. Create a MYOSt ID. This will be your SyMynd login ID and cannot be changed, so think of one that is secure and easy to remember. 6. Create a MYOSt password. You can change your password at any time. 7. Enter your Password Reset Question and Answer. This can be used at a later time if you forget your password. 8. Enter your e-mail address. You will receive e-mail notification when new information is available in [address] 9. Click Sign Up. You can now view and download portions of your medical record. 10. Click the Download Summary menu link to download a portable copy of your medical information. Additional Information    If you have questions, please visit the Frequently Asked Questions section of the SyMynd website at [email or web address] Remember, SyMynd is NOT to be used for urgent needs. For medical emergencies, dial 911.

## 2018-02-26 NOTE — ANESTHESIA PREPROCEDURE EVALUATION
Anesthetic History     PONV          Review of Systems / Medical History  Patient summary reviewed, nursing notes reviewed and pertinent labs reviewed    Pulmonary            Asthma        Neuro/Psych   Within defined limits           Cardiovascular  Within defined limits                Exercise tolerance: >4 METS     GI/Hepatic/Renal  Within defined limits              Endo/Other    Diabetes  Hypothyroidism       Other Findings                   Anesthetic Plan    ASA: 2  Anesthesia type: MAC            Anesthetic plan and risks discussed with: Patient

## 2018-02-26 NOTE — H&P
Gastroenterology Outpatient History and Physical    Patient: Emiliano Junior    Physician: Mickie Nina MD    Chief Complaint: Fam hx colon polyps  History of Present Illness: 48yo F with Fam hx colon polyps in need of CRC screening    History:  Past Medical History:   Diagnosis Date    Adverse effect of anesthesia     anxiety with waking up    Asthma     hx of asthma r/t allergies/emotional response    Diabetes ([address]     pre diabetic/ no meds    Hx of mammogram 10/2/12    Ill-defined condition     low blood pressure    Nausea & vomiting     Nephrolithiasis     right kidney stone    Other ill-defined conditions(799.89)     echocardiogram,12/2010,  has murmur    Thyroid disease     enlarged & inflamed thryoid/ hypo       Past Surgical History:   Procedure Laterality Date    ABDOMEN SURGERY PROC UNLISTED      laparoscopy to check for endometriosis    HX BUNIONECTOMY  2001    ,right bunion    HX HERNIA REPAIR      umbilical    HX HYSTERECTOMY  2007    laparoscopic hysterecomy, rectocele repair, umbilical hernia repair, sling    HX ORTHOPAEDIC Right 2001    foot-bunion with hardware    HX RECTOCELE REPAIR  2007    HX TONSILLECTOMY  age 8   [address] UROLOGICAL  2007    urethral sling      Social History     Social History    Marital status:      Spouse name:     Number of children: 3    Years of education: N/A     Occupational History    Mammographer-Kahlil Gonzalez     Social History Main Topics    Smoking status: Former Smoker     Years: 10.00     Types: Cigarettes     Quit date: 7/13/1989    Smokeless tobacco: Never Used      Comment: used to smoke 2-3 cigarettes a day    Alcohol use No    Drug use: No    Sexual activity: Not Currently     Other Topics Concern    Exercise No     runs regularly     Social History Narrative    One son, two daughters      Family History   Problem Relation Age of Onset    Thyroid Disease Father     Cancer Father      CML    Colon Polyps Father  Thyroid Disease Brother     Thyroid Cancer Brother     Heart Disease Maternal Grandfather     Heart Disease Paternal Grandfather       Patient Active Problem List   Diagnosis Code    Hypothyroidism E03.9    Allergic rhinitis J30.9    Microscopic hematuria R31.29    Dyslipidemia E78.5    Dysuria R30.0    Prediabetes R73.03    Thyroid nodule E04.1    Hypertriglyceridemia E78.1    Neuropathic pain of both legs G57.91, G57.92       Allergies: Allergies   Allergen Reactions    Pcn [Penicillins] Nausea Only     Orally only,can take intravenously     Medications:   Prior to Admission medications    Medication Sig Start Date End Date Taking? Authorizing Provider   cetirizine (ZYRTEC) 10 mg tablet Take 10 mg by mouth daily. Yes Historical Provider   zinc 50 mg tab tablet Take  by mouth daily. Yes Historical Provider   ESTRACE 0.01 % (0.1 mg/gram) vaginal cream APPLY 1GM TO VAGINAL AREA EVERY DAY FOR 2 WKS AND THEN 2-3 X WEEKLY THEREAFTER 1/6/18  Yes Historical Provider   cephALEXin (KEFLEX) 250 mg capsule TAKE 1 CAPSULE BY MOUTH EVERY DAY-TAKE ON ODD NUMBERED MONTHS 1/4/18  Yes Historical Provider   metFORMIN (GLUCOPHAGE) 500 mg tablet TAKE ONE TABLET BY MOUTH 2 TIMES A DAY WITH MEALS 1/22/18  Yes Johanna Amor MD   glucose blood VI test strips (BLOOD GLUCOSE TEST) strip Check BG 2-3 times/day 9/28/17  Yes Johanna Amor MD   LEVOXYL 88 mcg tablet Take 1 Tab by mouth Daily (before breakfast). BRAND MEDICALLY NECESSARY 9/5/17  Yes Gina Reagan MD   TURMERIC ROOT EXTRACT PO Take  by mouth. Yes Historical Provider   Biotin 2,500 mcg cap Take 5,000 mcg by mouth daily. Yes Moraima Apodaca MD   cholecalciferol, vitamin D3, 2,000 unit tab Take 2,000 Units by mouth daily. Yes Historical Provider   magnesium oxide (MAG-OX) 400 mg tablet Take 400 mg by mouth daily. Yes Historical Provider   VITAMIN E, DL,TOCOPHERYL ACET, (VITAMIN E ACETATE) 400 unit cap capsule Take  by mouth daily.    Yes Historical Provider   GAVILYTE-N 420 gram solution USE AS DIRECTED 12/15/17   Historical Provider   nitrofurantoin, macrocrystal-monohydrate, (MACROBID) 100 mg capsule TAKE 1 TABLET BY MOUTH EVERY DAY, TAKE ON EVEN NUMBERED MONTHS 1/4/18   Historical Provider     Physical Exam:   Vital Signs: Blood pressure 108/69, pulse 72, temperature 98.6 °F (37 °C), resp. rate (!) 72, height 5' 9\" (1.753 m), weight 71.3 kg (157 lb 4 oz), SpO2 100 %.   General: well developed, well nourished   HEENT: unremarkable   Heart: regular rhythm no mumur    Lungs: clear   Abdominal:  benign   Neurological: unremarkable   Extremities: no edema     Findings/Diagnosis: Fam hx colon polyps  Plan of Care/Planned Procedure: Colonoscopy with conscious/deep sedation    Signed:  Jason Morton MD 2/26/2018

## 2018-02-26 NOTE — PERIOP NOTES
Anesthesia reports 220mg Propofol, 50mg Lidocaine 80 mcg Neosynephrine and 450mL NS given during procedure. Received report from anesthesia staff on vital signs and status of patient.

## 2018-02-26 NOTE — ANESTHESIA POSTPROCEDURE EVALUATION
Post-Anesthesia Evaluation and Assessment    Patient: Buffy Clark MRN: [id number]  SSN: [id number]    YOB: 1965  Age: 45 y.o. Sex: female       Cardiovascular Function/Vital Signs  Visit Vitals    BP 98/70    Pulse 62    Temp 36.4 °C (97.6 °F)    Resp 9    Ht 5' 9\" (1.753 m)    Wt 71.3 kg (157 lb 4 oz)    SpO2 100%    BMI 23.22 kg/m2       Patient is status post MAC anesthesia for Procedure(s):  COLONOSCOPY. Nausea/Vomiting: None    Postoperative hydration reviewed and adequate. Pain:  Pain Scale 1: Visual (02/26/18 0820)  Pain Intensity 1: 0 (02/26/18 0820)   Managed    Neurological Status: At baseline    Mental Status and Level of Consciousness: Arousable    Pulmonary Status:   O2 Device: Room air (02/26/18 0820)   Adequate oxygenation and airway patent    Complications related to anesthesia: None    Post-anesthesia assessment completed.  No concerns    Signed By: Stanley Johnson MD     February 26, 2018

## 2018-03-19 ENCOUNTER — TELEPHONE (OUTPATIENT)
Dept: URGENT CARE | Age: 53
End: 2018-03-19

## 2018-03-19 ENCOUNTER — HOSPITAL ENCOUNTER (EMERGENCY)
Age: 53
Discharge: HOME | End: 2018-03-19
Attending: EMERGENCY MEDICINE
Payer: COMMERCIAL

## 2018-03-19 ENCOUNTER — OFFICE VISIT (OUTPATIENT)
Dept: URGENT CARE | Age: 53
End: 2018-03-19

## 2018-03-19 VITALS
DIASTOLIC BLOOD PRESSURE: 79 MMHG | BODY MASS INDEX: 24.05 KG/M2 | WEIGHT: 162.4 LBS | SYSTOLIC BLOOD PRESSURE: 120 MMHG | RESPIRATION RATE: 16 BRPM | HEART RATE: 103 BPM | TEMPERATURE: 99.8 F | HEIGHT: 69 IN | OXYGEN SATURATION: 98 %

## 2018-03-19 VITALS
BODY MASS INDEX: 23.96 KG/M2 | WEIGHT: 161.8 LBS | SYSTOLIC BLOOD PRESSURE: 122 MMHG | TEMPERATURE: 100.4 F | HEIGHT: 69 IN | OXYGEN SATURATION: 98 % | HEART RATE: 107 BPM | RESPIRATION RATE: 18 BRPM | DIASTOLIC BLOOD PRESSURE: 70 MMHG

## 2018-03-19 DIAGNOSIS — R50.9 ACUTE FEBRILE ILLNESS: Primary | ICD-10-CM

## 2018-03-19 DIAGNOSIS — J18.9 PNEUMONIA OF RIGHT LOWER LOBE DUE TO INFECTIOUS ORGANISM: ICD-10-CM

## 2018-03-19 DIAGNOSIS — J18.9 PNEUMONIA OF RIGHT LOWER LOBE DUE TO INFECTIOUS ORGANISM: Primary | ICD-10-CM

## 2018-03-19 LAB
ALBUMIN SERPL-MCNC: 3.8 G/DL (ref 3.5–5)
ALBUMIN/GLOB SERPL: 0.9 {RATIO} (ref 1.1–2.2)
ALP SERPL-CCNC: 72 U/L (ref 45–117)
ALT SERPL-CCNC: 42 U/L (ref 12–78)
ANION GAP SERPL CALC-SCNC: 10 MMOL/L (ref 5–15)
APPEARANCE UR: CLEAR
AST SERPL-CCNC: 30 U/L (ref 15–37)
BACTERIA URNS QL MICRO: NEGATIVE /HPF
BASOPHILS # BLD: 0 K/UL (ref 0–0.1)
BASOPHILS NFR BLD: 0 % (ref 0–1)
BILIRUB SERPL-MCNC: 0.5 MG/DL (ref 0.2–1)
BILIRUB UR QL: NEGATIVE
BUN SERPL-MCNC: 16 MG/DL (ref 6–20)
BUN/CREAT SERPL: 17 (ref 12–20)
CALCIUM SERPL-MCNC: 9.3 MG/DL (ref 8.5–10.1)
CHLORIDE SERPL-SCNC: 105 MMOL/L (ref 97–108)
CO2 SERPL-SCNC: 23 MMOL/L (ref 21–32)
COLOR UR: NORMAL
CREAT SERPL-MCNC: 0.94 MG/DL (ref 0.55–1.02)
DIFFERENTIAL METHOD BLD: ABNORMAL
EOSINOPHIL # BLD: 0 K/UL (ref 0–0.4)
EOSINOPHIL NFR BLD: 0 % (ref 0–7)
EPITH CASTS URNS QL MICRO: NORMAL /LPF
ERYTHROCYTE [DISTWIDTH] IN BLOOD BY AUTOMATED COUNT: 13.2 % (ref 11.5–14.5)
FLUAV+FLUBV AG NOSE QL IA.RAPID: NEGATIVE POS/NEG
FLUAV+FLUBV AG NOSE QL IA.RAPID: NEGATIVE POS/NEG
GLOBULIN SER CALC-MCNC: 4.3 G/DL (ref 2–4)
GLUCOSE SERPL-MCNC: 111 MG/DL (ref 65–100)
GLUCOSE UR STRIP.AUTO-MCNC: NEGATIVE MG/DL
HCT VFR BLD AUTO: 38 % (ref 35–47)
HGB BLD-MCNC: 12.9 G/DL (ref 11.5–16)
HGB UR QL STRIP: NEGATIVE
HYALINE CASTS URNS QL MICRO: NORMAL /LPF (ref 0–5)
IMM GRANULOCYTES # BLD: 0 K/UL (ref 0–0.04)
IMM GRANULOCYTES NFR BLD AUTO: 0 % (ref 0–0.5)
KETONES UR QL STRIP.AUTO: NEGATIVE MG/DL
LACTATE SERPL-SCNC: 1 MMOL/L (ref 0.4–2)
LEUKOCYTE ESTERASE UR QL STRIP.AUTO: NEGATIVE
LYMPHOCYTES # BLD: 1 K/UL (ref 0.8–3.5)
LYMPHOCYTES NFR BLD: 9 % (ref 12–49)
MCH RBC QN AUTO: 28.5 PG (ref 26–34)
MCHC RBC AUTO-ENTMCNC: 33.9 G/DL (ref 30–36.5)
MCV RBC AUTO: 83.9 FL (ref 80–99)
MONOCYTES # BLD: 0.7 K/UL (ref 0–1)
MONOCYTES NFR BLD: 7 % (ref 5–13)
NEUTS SEG # BLD: 8.9 K/UL (ref 1.8–8)
NEUTS SEG NFR BLD: 83 % (ref 32–75)
NITRITE UR QL STRIP.AUTO: NEGATIVE
NRBC # BLD: 0 K/UL (ref 0–0.01)
NRBC BLD-RTO: 0 PER 100 WBC
PH UR STRIP: 6 [PH] (ref 5–8)
PLATELET # BLD AUTO: 256 K/UL (ref 150–400)
PMV BLD AUTO: 9.8 FL (ref 8.9–12.9)
POTASSIUM SERPL-SCNC: 3.9 MMOL/L (ref 3.5–5.1)
PROT SERPL-MCNC: 8.1 G/DL (ref 6.4–8.2)
PROT UR STRIP-MCNC: NEGATIVE MG/DL
RBC # BLD AUTO: 4.53 M/UL (ref 3.8–5.2)
RBC #/AREA URNS HPF: NORMAL /HPF (ref 0–5)
SODIUM SERPL-SCNC: 138 MMOL/L (ref 136–145)
SP GR UR REFRACTOMETRY: 1.01 (ref 1–1.03)
UA: UC IF INDICATED,UAUC: NORMAL
UROBILINOGEN UR QL STRIP.AUTO: 0.2 EU/DL (ref 0.2–1)
VALID INTERNAL CONTROL?: YES
WBC # BLD AUTO: 10.7 K/UL (ref 3.6–11)
WBC URNS QL MICRO: NORMAL /HPF (ref 0–4)

## 2018-03-19 PROCEDURE — 83605 ASSAY OF LACTIC ACID: CPT | Performed by: EMERGENCY MEDICINE

## 2018-03-19 PROCEDURE — 85025 COMPLETE CBC W/AUTO DIFF WBC: CPT | Performed by: EMERGENCY MEDICINE

## 2018-03-19 PROCEDURE — 81001 URINALYSIS AUTO W/SCOPE: CPT | Performed by: EMERGENCY MEDICINE

## 2018-03-19 PROCEDURE — 36415 COLL VENOUS BLD VENIPUNCTURE: CPT | Performed by: EMERGENCY MEDICINE

## 2018-03-19 PROCEDURE — 87086 URINE CULTURE/COLONY COUNT: CPT | Performed by: EMERGENCY MEDICINE

## 2018-03-19 PROCEDURE — 80053 COMPREHEN METABOLIC PANEL: CPT | Performed by: EMERGENCY MEDICINE

## 2018-03-19 PROCEDURE — 87040 BLOOD CULTURE FOR BACTERIA: CPT | Performed by: EMERGENCY MEDICINE

## 2018-03-19 PROCEDURE — 99283 EMERGENCY DEPT VISIT LOW MDM: CPT

## 2018-03-19 PROCEDURE — 93005 ELECTROCARDIOGRAM TRACING: CPT

## 2018-03-19 RX ORDER — PREDNISONE 20 MG/1
40 TABLET ORAL DAILY
Qty: 10 TAB | Refills: 0 | Status: SHIPPED | OUTPATIENT
Start: 2018-03-19 | End: 2018-03-24

## 2018-03-19 RX ORDER — ALBUTEROL SULFATE 90 UG/1
2 AEROSOL, METERED RESPIRATORY (INHALATION)
Qty: 1 INHALER | Refills: 1 | Status: SHIPPED | OUTPATIENT
Start: 2018-03-19 | End: 2018-09-12 | Stop reason: ALTCHOICE

## 2018-03-19 RX ORDER — ACETAMINOPHEN 325 MG/1
650 TABLET ORAL
Qty: 2 TAB | Refills: 0 | Status: SHIPPED | COMMUNITY
Start: 2018-03-19 | End: 2018-03-19

## 2018-03-19 RX ORDER — IBUPROFEN 800 MG/1
800 TABLET ORAL
Qty: 30 TAB | Refills: 0 | Status: SHIPPED | OUTPATIENT
Start: 2018-03-19 | End: 2018-09-12 | Stop reason: ALTCHOICE

## 2018-03-19 RX ORDER — LEVOFLOXACIN 750 MG/1
750 TABLET ORAL DAILY
Qty: 7 TAB | Refills: 0 | Status: SHIPPED | OUTPATIENT
Start: 2018-03-19 | End: 2018-03-26

## 2018-03-19 RX ORDER — ACETAMINOPHEN 500 MG
1000 TABLET ORAL
Qty: 30 TAB | Refills: 0 | Status: SHIPPED | OUTPATIENT
Start: 2018-03-19 | End: 2018-09-12 | Stop reason: ALTCHOICE

## 2018-03-19 RX ORDER — CODEINE PHOSPHATE AND GUAIFENESIN 10; 100 MG/5ML; MG/5ML
5 SOLUTION ORAL
Qty: 120 ML | Refills: 0 | Status: SHIPPED | OUTPATIENT
Start: 2018-03-19 | End: 2018-09-12 | Stop reason: ALTCHOICE

## 2018-03-19 NOTE — PROGRESS NOTES
Patient is a 45 y.o. female presenting with cold symptoms. Cold Symptoms   The history is provided by the patient. This is a new problem. The current episode started yesterday. The problem occurs every few minutes. The problem has been gradually worsening. The cough is productive of sputum. There has been a fever of 100 - 100.9 F. The fever has been present for less than 1 day. Associated symptoms include chills. Pertinent negatives include no chest pain, no sweats, no ear congestion, no ear pain, no headaches, no rhinorrhea, no sore throat, no myalgias, no shortness of breath and no wheezing. She has tried inhalers for the symptoms. The treatment provided no relief. Her past medical history is significant for asthma.         Past Medical History:   Diagnosis Date    Adverse effect of anesthesia     anxiety with waking up    Asthma     hx of asthma r/t allergies/emotional response    Diabetes ([facility] [address]     pre diabetic/ no meds    Hx of mammogram 10/2/12    Ill-defined condition     low blood pressure    Nausea & vomiting     Nephrolithiasis     right kidney stone    Other ill-defined conditions(799.89)     echocardiogram,12/2010,  has murmur    Thyroid disease     enlarged & inflamed thryoid/ hypo         Past Surgical History:   Procedure Laterality Date    ABDOMEN SURGERY PROC UNLISTED      laparoscopy to check for endometriosis    COLONOSCOPY N/A 2/26/2018    COLONOSCOPY performed by Kaitlynn [facility]reras [facility] at [facility]  2/26/2018         HX BUNIONECTOMY  2001    ,right bunion    HX HERNIA REPAIR      umbilical    HX HYSTERECTOMY  2007    laparoscopic hysterecomy, rectocele repair, umbilical hernia repair, sling    HX ORTHOPAEDIC Right 2001    foot-bunion with hardware    HX RECTOCELE REPAIR  2007    HX TONSILLECTOMY  age 8    HX UROLOGICAL  2007    urethral sling         Family History   Problem Relation Age of Onset    Thyroid Disease Father     Cancer Father      CML    Colon Polyps Father     Thyroid Disease Brother     Thyroid Cancer Brother     Heart Disease Maternal Grandfather     Heart Disease Paternal Grandfather         Social History     Social History    Marital status:      Spouse name:     Number of children: 3    Years of education: N/A     Occupational History    Mammographer-Kahlil Gonzalez     Social History Main Topics    Smoking status: Former Smoker     Years: 10.00     Types: Cigarettes     Quit date: 7/13/1989    Smokeless tobacco: Never Used      Comment: used to smoke 2-3 cigarettes a day    Alcohol use No    Drug use: No    Sexual activity: Not Currently     Other Topics Concern    Exercise No     runs regularly     Social History Narrative    One son, two daughters                ALLERGIES: Pcn [penicillins]    Review of Systems   Constitutional: Positive for chills and fever. HENT: Negative for ear pain, rhinorrhea and sore throat. Respiratory: Negative for shortness of breath and wheezing. Cardiovascular: Negative for chest pain and palpitations. Musculoskeletal: Negative for myalgias. Skin: Negative for rash. Neurological: Negative for headaches. Hematological: Negative for adenopathy. Vitals:    03/19/18 1421   BP: 122/70   Pulse: (!) 107   Resp: 18   Temp: 100.4 °F (38 °C)   SpO2: 98%   Weight: 161 lb 12.8 oz (73.4 kg)   Height: 5' 9\" (1.753 m)       Physical Exam   Constitutional: She appears well-developed and well-nourished. No distress. HENT:   Right Ear: Tympanic membrane, external ear and ear canal normal.   Left Ear: Tympanic membrane, external ear and ear canal normal.   Nose: Nose normal. Right sinus exhibits no maxillary sinus tenderness and no frontal sinus tenderness. Left sinus exhibits no maxillary sinus tenderness and no frontal sinus tenderness.    Mouth/Throat: Oropharynx is clear and moist and mucous membranes are normal. No oropharyngeal exudate, posterior oropharyngeal edema, posterior oropharyngeal erythema or tonsillar abscesses. Cardiovascular: Normal rate, regular rhythm and normal heart sounds. Pulmonary/Chest: Effort normal and breath sounds normal. No respiratory distress. She has no wheezes. She has no rales. Lymphadenopathy:     She has no cervical adenopathy. Neurological: She is alert. Skin: She is not diaphoretic. Psychiatric: She has a normal mood and affect. Her behavior is normal. Judgment and thought content normal.   Nursing note and vitals reviewed. MDM    Procedures        ICD-10-CM ICD-9-CM    1. Pneumonia of right lower lobe due to infectious organism ([facility] [address] J18.1 486 AMB POC KANDICE INFLUENZA A/B TEST      acetaminophen (TYLENOL) 325 mg tablet      XR CHEST PA LAT     Medications Ordered Today   Medications    acetaminophen (TYLENOL) 325 mg tablet     Sig: Take 2 Tabs by mouth now for 1 dose. Dispense:  2 Tab     Refill:  0     Order Specific Question:   Expiration Date     Answer:   11/30/2018     Order Specific Question:   Lot#     Answer:   [id number]     Order Specific Question:        Answer:   Mckeon Stairs     Order Specific Question:   NDC#     Answer:   [id number]    levoFLOXacin (LEVAQUIN) 750 mg tablet     Sig: Take 1 Tab by mouth daily for 7 days. Dispense:  7 Tab     Refill:  0     Results for orders placed or performed in visit on 03/19/18   AMB POC KANDICE INFLUENZA A/B TEST   Result Value Ref Range    VALID INTERNAL CONTROL POC Yes     Influenza A Ag POC Negative Negative Pos/Neg    Influenza B Ag POC Negative Negative Pos/Neg     XR Results (most recent):    Results from Appointment encounter on 03/19/18   XR CHEST PA LAT   Narrative INDICATION:  cough     Exam: Chest 2 views. Comparison: None. Findings: Cardiomediastinal silhouette is normal. Pulmonary vasculature is not  engorged. There is airspace disease in the right infrahilar region. Right  hemidiaphragm minimally elevated.  Atelectasis left base without pneumothorax or  effusion. Bony thorax is intact. Impression Impression:  1. Right infrahilar airspace disease likely in the lower lobe. Given history  this may represent pneumonia. Recommend follow-up to ensure resolution          The patients condition was discussed with the patient and they understand. The patient is to follow up with primary care doctor in [address] [place] [address] ,If signs and symptoms become worse the pt is to go to the ER. The patient is to take medications as prescribed.

## 2018-03-19 NOTE — PATIENT INSTRUCTIONS
The patient is to follow up with the primary care doctor in 1-2 days, if signs and symptoms become worse the patient is to go to the ER. The patient is to take medications as prescribed. Medications Ordered Today   Medications    acetaminophen (TYLENOL) 325 mg tablet     Sig: Take 2 Tabs by mouth now for 1 dose. Dispense:  2 Tab     Refill:  0     Order Specific Question:   Expiration Date     Answer:   11/30/2018     Order Specific Question:   Lot#     Answer:   [id number]     Order Specific Question:        Answer:   Earnstine Light     Order Specific Question:   NDC#     Answer:   [id number]        Pneumonia: Care Instructions  Your Care Instructions    Pneumonia is an infection of the lungs. Most cases are caused by infections from bacteria or viruses. Pneumonia may be mild or very severe. If it is caused by bacteria, you will be treated with antibiotics. It may take a few weeks to a few months to recover fully from pneumonia, depending on how sick you were and whether your overall health is good. Follow-up care is a key part of your treatment and safety. Be sure to make and go to all appointments, and call your doctor if you are having problems. It's also a good idea to know your test results and keep a list of the medicines you take. How can you care for yourself at home? · Take your antibiotics exactly as directed. Do not stop taking the medicine just because you are feeling better. You need to take the full course of antibiotics. · Take your medicines exactly as prescribed. Call your doctor if you think you are having a problem with your medicine. · Get plenty of rest and sleep. You may feel weak and tired for a while, but your energy level will improve with time. · To prevent dehydration, drink plenty of fluids, enough so that your urine is light yellow or clear like water. Choose water and other caffeine-free clear liquids until you feel better.  If you have kidney, heart, or liver disease and have to limit fluids, talk with your doctor before you increase the amount of fluids you drink. · Take care of your cough so you can rest. A cough that brings up mucus from your lungs is common with pneumonia. It is one way your body gets rid of the infection. But if coughing keeps you from resting or causes severe fatigue and chest-wall pain, talk to your doctor. He or she may suggest that you take a medicine to reduce the cough. · Use a vaporizer or humidifier to add moisture to your bedroom. Follow the directions for cleaning the machine. · Do not smoke or allow others to smoke around you. Smoke will make your cough last longer. If you need help quitting, talk to your doctor about stop-smoking programs and medicines. These can increase your chances of quitting for good. · Take an over-the-counter pain medicine, such as acetaminophen (Tylenol), ibuprofen (Advil, Motrin), or naproxen (Aleve). Read and follow all instructions on the label. · Do not take two or more pain medicines at the same time unless the doctor told you to. Many pain medicines have acetaminophen, which is Tylenol. Too much acetaminophen (Tylenol) can be harmful. · If you were given a spirometer to measure how well your lungs are working, use it as instructed. This can help your doctor tell how your recovery is going. · To prevent pneumonia in the future, talk to your doctor about getting a flu vaccine (once a year) and a pneumococcal vaccine (one time only for most people). When should you call for help? Call 911 anytime you think you may need emergency care. For example, call if:  ? · You have severe trouble breathing. ?Call your doctor now or seek immediate medical care if:  ? · You cough up dark brown or bloody mucus (sputum). ? · You have new or worse trouble breathing. ? · You are dizzy or lightheaded, or you feel like you may faint. ? Watch closely for changes in your health, and be sure to contact your doctor if:  ? · You have a new or higher fever. ? · You are coughing more deeply or more often. ? · You are not getting better after 2 days (48 hours). ? · You do not get better as expected. Where can you learn more? Go to http://jessica-faustina.info/. Enter [id number] in the search box to learn more about \"Pneumonia: Care Instructions. \"  Current as of: May 12, 2017  Content Version: 11.4  © 2564-7294 [employer]. Care instructions adapted under license by [employer] (which disclaims liability or warranty for this information). If you have questions about a medical condition or this instruction, always ask your healthcare professional. [address] any warranty or liability for your use of this information.

## 2018-03-19 NOTE — ED TRIAGE NOTES
Pt reports having the flu 2/11, CXR negative for PNA. Pt c/o SOB, chills, and fever today, productive yellow cough. Seen at Urgent Care, CXR shows R lower lobe PNA, started on PO Levaquin today.

## 2018-03-19 NOTE — MR AVS SNAPSHOT
[address] [place] [address] 
[phone] Patient: Buffy Clark MRN: [id number] VNK:[id number] Visit Information Date & Time Provider Department Dept. Phone Encounter #  
 3/19/2018  2:15 PM [address] [facility] [phone] [id number] Your Appointments 8/7/2018  8:30 AM  
ROUTINE CARE with Tonie Woody MD  
[facility] [address] [place] [address]) Appt Note: f/u diabetes cp0.00  
 [address] [facility] [address]  
[address] [address] [facility] [address] [address] Upcoming Health Maintenance Date Due FOBT Q 1 YEAR AGE 50-75 3/30/2013 BREAST CANCER SCRN MAMMOGRAM 4/13/2019 PAP AKA CERVICAL CYTOLOGY 4/25/2022 DTaP/Tdap/Td series (2 - Td) 11/28/2026 Allergies as of 3/19/2018  Review Complete On: 3/19/2018 By: Jaren Blackwood RN Severity Noted Reaction Type Reactions Pcn [Penicillins] Low 07/13/2011   Side Effect Nausea Only Orally only,can take intravenously Current Immunizations  Reviewed on 10/11/2017 Name Date Influenza Vaccine (Quad) PF 10/11/2017 Td 6/1/2011 Tdap 11/28/2016 Not reviewed this visit You Were Diagnosed With   
  
 Codes Comments Pneumonia of right lower lobe due to infectious organism [facility])    -  Primary ICD-10-CM: J18.1 ICD-9-CM: 740 Vitals BP Pulse Temp Resp Height(growth percentile) Weight(growth percentile) 122/70 (!) 107 100.4 °F (38 °C) 18 5' 9\" (1.753 m) 161 lb 12.8 oz (73.4 kg) SpO2 BMI OB Status Smoking Status 98% 23.89 kg/m2 Hysterectomy Former Smoker BMI and BSA Data Body Mass Index Body Surface Area  
 23.89 kg/m 2 1.89 m 2 Preferred Pharmacy Pharmacy Name Phone [id number] [address] [facility] [phone] Your Updated Medication List  
  
   
This list is accurate as of 3/19/18  3:34 PM.  Always use your most recent med list.  
  
  
  
  
 acetaminophen 325 mg tablet Commonly known as:  TYLENOL Take 2 Tabs by mouth now for 1 dose. Biotin 2,500 mcg Cap Take 5,000 mcg by mouth daily. cephALEXin 250 mg capsule Commonly known as:  Burke Poll TAKE 1 CAPSULE BY MOUTH EVERY DAY-TAKE ON ODD NUMBERED MONTHS  
  
 cholecalciferol (vitamin D3) 2,000 unit Tab Take 2,000 Units by mouth daily. ESTRACE 0.01 % (0.1 mg/gram) vaginal cream  
Generic drug:  estradiol APPLY 1GM TO VAGINAL AREA EVERY DAY FOR 2 WKS AND THEN 2-3 X WEEKLY THEREAFTER  
  
 GAVILYTE-N 420 gram solution Generic drug:  peg-electrolyte soln USE AS DIRECTED  
  
 glucose blood VI test strips strip Commonly known as:  blood glucose test  
Check BG 2-3 times/day  
  
 levoFLOXacin 750 mg tablet Commonly known as:  Ellis Fields Take 1 Tab by mouth daily for 7 days. LEVOXYL 88 mcg tablet Generic drug:  levothyroxine Take 1 Tab by mouth Daily (before breakfast). BRAND MEDICALLY NECESSARY  
  
 magnesium oxide 400 mg tablet Commonly known as:  MAG-OX Take 400 mg by mouth daily. metFORMIN 500 mg tablet Commonly known as:  GLUCOPHAGE  
TAKE ONE TABLET BY MOUTH 2 TIMES A DAY WITH MEALS  
  
 nitrofurantoin (macrocrystal-monohydrate) 100 mg capsule Commonly known as:  MACROBID  
TAKE 1 TABLET BY MOUTH EVERY DAY, TAKE ON EVEN NUMBERED MONTHS TURMERIC ROOT EXTRACT PO Take  by mouth.  
  
 vitamin E acetate 400 unit Cap capsule Take  by mouth daily. zinc 50 mg Tab tablet Take  by mouth daily. ZyrTEC 10 mg tablet Generic drug:  cetirizine Take 10 mg by mouth daily. Prescriptions Sent to Pharmacy Refills  
 levoFLOXacin (LEVAQUIN) 750 mg tablet 0 Sig: Take 1 Tab by mouth daily for 7 days.   
 Class: Normal  
 Pharmacy: [id number]- [address] #: [phone] Route: Oral  
  
We Performed the Following AMB POC KANDICE INFLUENZA A/B TEST [67172 CPT(R)] To-Do List   
 03/19/2018 Imaging:  XR CHEST PA LAT   
  
 03/21/2018 5:45 PM  
  Appointment with MASSAGE[facility]ROGERIO at [address] [place] ([phone])  
  
 04/11/2018 5:45 PM  
  Appointment with MASSAGE-ROGERIO at [address] [place] ([phone])  
  
 04/25/2018 5:45 PM  
  Appointment with MASSAGE-ROGERIO at [address] [place] ([phone]) Patient Instructions The patient is to follow up with the primary care doctor in 1-2 days, if signs and symptoms become worse the patient is to go to the ER. The patient is to take medications as prescribed. Medications Ordered Today Medications  acetaminophen (TYLENOL) 325 mg tablet Sig: Take 2 Tabs by mouth now for 1 dose. Dispense:  2 Tab Refill:  0 Order Specific Question:   Expiration Date Answer:   11/30/2018 Order Specific Question:   Lot# Answer:   [id number] Order Specific Question:    Answer:   Sharyn Bundy Order Specific Question:   NDC# Answer:   [id number] Pneumonia: Care Instructions Your Care Instructions Pneumonia is an infection of the lungs. Most cases are caused by infections from bacteria or viruses. Pneumonia may be mild or very severe. If it is caused by bacteria, you will be treated with antibiotics. It may take a few weeks to a few months to recover fully from pneumonia, depending on how sick you were and whether your overall health is good. Follow-up care is a key part of your treatment and safety. Be sure to make and go to all appointments, and call your doctor if you are having problems. It's also a good idea to know your test results and keep a list of the medicines you take. How can you care for yourself at home? · Take your antibiotics exactly as directed. Do not stop taking the medicine just because you are feeling better. You need to take the full course of antibiotics. · Take your medicines exactly as prescribed. Call your doctor if you think you are having a problem with your medicine. · Get plenty of rest and sleep. You may feel weak and tired for a while, but your energy level will improve with time. · To prevent dehydration, drink plenty of fluids, enough so that your urine is light yellow or clear like water. Choose water and other caffeine-free clear liquids until you feel better. If you have kidney, heart, or liver disease and have to limit fluids, talk with your doctor before you increase the amount of fluids you drink. · Take care of your cough so you can rest. A cough that brings up mucus from your lungs is common with pneumonia. It is one way your body gets rid of the infection. But if coughing keeps you from resting or causes severe fatigue and chest-wall pain, talk to your doctor. He or she may suggest that you take a medicine to reduce the cough. · Use a vaporizer or humidifier to add moisture to your bedroom. Follow the directions for cleaning the machine. · Do not smoke or allow others to smoke around you. Smoke will make your cough last longer. If you need help quitting, talk to your doctor about stop-smoking programs and medicines. These can increase your chances of quitting for good. · Take an over-the-counter pain medicine, such as acetaminophen (Tylenol), ibuprofen (Advil, Motrin), or naproxen (Aleve). Read and follow all instructions on the label. · Do not take two or more pain medicines at the same time unless the doctor told you to. Many pain medicines have acetaminophen, which is Tylenol. Too much acetaminophen (Tylenol) can be harmful. · If you were given a spirometer to measure how well your lungs are working, use it as instructed.  This can help your doctor tell how your recovery is going. · To prevent pneumonia in the future, talk to your doctor about getting a flu vaccine (once a year) and a pneumococcal vaccine (one time only for most people). When should you call for help? Call 911 anytime you think you may need emergency care. For example, call if: 
? · You have severe trouble breathing. ?Call your doctor now or seek immediate medical care if: 
? · You cough up dark brown or bloody mucus (sputum). ? · You have new or worse trouble breathing. ? · You are dizzy or lightheaded, or you feel like you may faint. ? Watch closely for changes in your health, and be sure to contact your doctor if: 
? · You have a new or higher fever. ? · You are coughing more deeply or more often. ? · You are not getting better after 2 days (48 hours). ? · You do not get better as expected. Where can you learn more? Go to http://jessica-faustina.info/. Enter [id number] in the search box to learn more about \"Pneumonia: Care Instructions. \" Current as of: May 12, 2017 Content Version: 11.4 © 1310-7963 [employer]. Care instructions adapted under license by [employer] (which disclaims liability or warranty for this information). If you have questions about a medical condition or this instruction, always ask your healthcare professional. [address] any warranty or liability for your use of this information. Introducing [facility]! Dear Kacie Hamm: Thank you for requesting a [employer] account. Our records indicate that you already have an active [employer] account. You can access your account anytime at [email or web address] Did you know that you can access your hospital and ER discharge instructions at any time in [employer]? You can also review all of your test results from your hospital stay or ER visit. Additional Information If you have questions, please visit the Frequently Asked Questions section of the [employer] website at [email or web address] Remember, Tuan800 is NOT to be used for urgent needs. For medical emergencies, dial 911. Now available from your iPhone and Android! Please provide this summary of care documentation to your next provider. Your primary care clinician is listed as Joanette Claude. If you have any questions after today's visit, please call [phone].

## 2018-03-20 ENCOUNTER — PATIENT OUTREACH (OUTPATIENT)
Dept: OTHER | Age: 53
End: 2018-03-20

## 2018-03-20 LAB
ATRIAL RATE: 95 BPM
CALCULATED P AXIS, ECG09: 52 DEGREES
CALCULATED R AXIS, ECG10: 15 DEGREES
CALCULATED T AXIS, ECG11: 31 DEGREES
DIAGNOSIS, 93000: NORMAL
P-R INTERVAL, ECG05: 154 MS
Q-T INTERVAL, ECG07: 328 MS
QRS DURATION, ECG06: 72 MS
QTC CALCULATION (BEZET), ECG08: 412 MS
VENTRICULAR RATE, ECG03: 95 BPM

## 2018-03-20 NOTE — PROGRESS NOTES
Transition Of Care Note    Patient discharged from [facility] ED for Pneumonia of right lower lobe . Medical History:     Past Medical History:   Diagnosis Date    Adverse effect of anesthesia     anxiety with waking up    Asthma     hx of asthma r/t allergies/emotional response    Diabetes ([address]     pre diabetic/ no meds    Hx of mammogram 10/2/12    Ill-defined condition     low blood pressure    Nausea & vomiting     Nephrolithiasis     right kidney stone    Other ill-defined conditions(799.89)     echocardiogram,2010,  has murmur    Thyroid disease     enlarged & inflamed thryoid/ hypo      9:58a - Telephone attempt to contact patient for transitions of care.  Left discreet message on [employer] with this CC contact information.     * 10:05a- Patient returned call. Rodrigo contacted the patient by telephone to perform post 3/19/18(ED) discharge assessment. Verified  and zip code with patient as identifiers. Provided introduction to self, and explanation of the Nurse Care Manager role. *Spoke with patient who reports that she is feeling better. Still coughing and some wheezing noted    *Patient given information regarding patient advocacy upon request.    Medication:   Performed medication reconciliation with patient, and patient verbalizes understanding of administration of home medications. There were no barriers to obtaining medications identified at this time. predniSONE 20 mg tablet - has not filled yet  acetaminophen 500 mg tablet  albuterol 90 mcg/actuation inhaler  guaiFENesin-codeine 100-10 mg/5 mL solution  ibuprofen 800 mg tablet    Support system:  patient and daughter    Discharge Instructions :  Reviewed discharge instructions with patient. Patient verbalizes understanding of discharge instructions and follow-up care.      Take your antibiotics exactly as directed  Take your medicines exactly as prescribed  Get plenty of rest and sleep  To prevent dehydration, drink plenty of fluids  Take care of your cough so you can rest. A cough that brings up mucus from your lungs is common with pneumonia. It is one way your body gets rid of the infection. Use a vaporizer or humidifier to add moisture to your bedroom. Do not smoke or allow others to smoke around you. Smoke will make your cough last longer  Take an over-the-counter pain medicine, such as acetaminophen (Tylenol), ibuprofen (Advil, Motrin), or naproxen (Aleve). Read and follow all instructions on the label. Red Flags:  Call 911 anytime you think you may need emergency care. For example, call if:  ·You have severe trouble breathing. Call your doctor now or seek immediate medical care if:  ·You cough up dark brown or bloody mucus (sputum). ·You have new or worse trouble breathing. ·You are dizzy or lightheaded, or you feel like you may faint. You have a new or higher fever. ·You are coughing more deeply or more often. ·You are not getting better after 2 days (48 hours)    Advance Care Planning:   Patient was offered the opportunity to discuss advance care planning:  no     Does patient have an Advance Directive:  no   If no, did you provide information on Caring Connections?  no     PCP/Specialist follow up: Patient scheduled to follow up with Ward Yung MD on 3/21/18 @ 2p. Reviewed red flags with patient, and patient verbalizes understanding. Patient given an opportunity to ask questions. No other clinical/social/functional needs noted. The patient agrees to contact the PCP office for questions related to their healthcare. The patient expressed thanks, offered no additional questions and ended the call. Next outreach 4/3/18 for f/u - Pneumonia and PCP appt.

## 2018-03-20 NOTE — DISCHARGE INSTRUCTIONS
Fever       Learning About Fever  What is a fever? A fever is a high body temperature. It's one way your body fights being sick. A fever shows that the body is responding to infection or other illnesses, both minor and severe. A fever is a symptom, not an illness by itself. A fever can be a sign that you are ill, but most fevers are not caused by a serious problem. You may have a fever with a minor illness, such as a cold. But sometimes a very serious infection may cause little or no fever. It is important to look at other symptoms, other conditions you have, and how you feel in general. In children, notice how they act and see what symptoms they complain of. What is a normal body temperature? A normal body temperature is about 98. 6ºF. Some people have a normal temperature that is a little higher or a little lower than this. Your temperature may be a little lower in the morning than it is later in the day. It may go up during hot weather or when you exercise, wear heavy clothes, or take a hot bath. Your temperature may also be different depending on how you take it. A temperature taken in the mouth (oral) or under the arm may be a little lower than your core temperature (rectal). What is a fever temperature? A core temperature of 100.4°F or above is considered a fever. What can cause a fever? A fever may be caused by:  · Infections. This is the most common cause of a fever. Examples of infections that can cause a fever include the flu, a kidney infection, or pneumonia. · Some medicines. · Severe trauma or injury, such as a heart attack, stroke, heatstroke, or burns. · Other medical conditions, such as arthritis and some cancers. How can you treat a fever at home? · Ask your doctor if you can take an over-the-counter pain medicine, such as acetaminophen (Tylenol), ibuprofen (Advil, Motrin), or naproxen (Aleve). Be safe with medicines. Read and follow all instructions on the label.   · To prevent dehydration, drink plenty of fluids. Choose water and other caffeine-free clear liquids until you feel better. If you have kidney, heart, or liver disease and have to limit fluids, talk with your doctor before you increase the amount of fluids you drink. Follow-up care is a key part of your treatment and safety. Be sure to make and go to all appointments, and call your doctor if you are having problems. It's also a good idea to know your test results and keep a list of the medicines you take. Where can you learn more? Go to http://jessica-faustina.info/. Enter Y248 in the search box to learn more about \"Learning About Fever. \"  Current as of: March 20, 2017  Content Version: 11.4  © 2179-9395 [employer]. Care instructions adapted under license by [employer] (which disclaims liability or warranty for this information). If you have questions about a medical condition or this instruction, always ask your healthcare professional. [place] [address] any warranty or liability for your use of this information. Pneumonia: Care Instructions  Your Care Instructions    Pneumonia is an infection of the lungs. Most cases are caused by infections from bacteria or viruses. Pneumonia may be mild or very severe. If it is caused by bacteria, you will be treated with antibiotics. It may take a few weeks to a few months to recover fully from pneumonia, depending on how sick you were and whether your overall health is good. Follow-up care is a key part of your treatment and safety. Be sure to make and go to all appointments, and call your doctor if you are having problems. It's also a good idea to know your test results and keep a list of the medicines you take. How can you care for yourself at home? · Take your antibiotics exactly as directed. Do not stop taking the medicine just because you are feeling better. You need to take the full course of antibiotics.   · Take your medicines exactly as prescribed. Call your doctor if you think you are having a problem with your medicine. · Get plenty of rest and sleep. You may feel weak and tired for a while, but your energy level will improve with time. · To prevent dehydration, drink plenty of fluids, enough so that your urine is light yellow or clear like water. Choose water and other caffeine-free clear liquids until you feel better. If you have kidney, heart, or liver disease and have to limit fluids, talk with your doctor before you increase the amount of fluids you drink. · Take care of your cough so you can rest. A cough that brings up mucus from your lungs is common with pneumonia. It is one way your body gets rid of the infection. But if coughing keeps you from resting or causes severe fatigue and chest-wall pain, talk to your doctor. He or she may suggest that you take a medicine to reduce the cough. · Use a vaporizer or humidifier to add moisture to your bedroom. Follow the directions for cleaning the machine. · Do not smoke or allow others to smoke around you. Smoke will make your cough last longer. If you need help quitting, talk to your doctor about stop-smoking programs and medicines. These can increase your chances of quitting for good. · Take an over-the-counter pain medicine, such as acetaminophen (Tylenol), ibuprofen (Advil, Motrin), or naproxen (Aleve). Read and follow all instructions on the label. · Do not take two or more pain medicines at the same time unless the doctor told you to. Many pain medicines have acetaminophen, which is Tylenol. Too much acetaminophen (Tylenol) can be harmful. · If you were given a spirometer to measure how well your lungs are working, use it as instructed. This can help your doctor tell how your recovery is going. · To prevent pneumonia in the future, talk to your doctor about getting a flu vaccine (once a year) and a pneumococcal vaccine (one time only for most people).   When should you call for help? Call 911 anytime you think you may need emergency care. For example, call if:  ? · You have severe trouble breathing. ?Call your doctor now or seek immediate medical care if:  ? · You cough up dark brown or bloody mucus (sputum). ? · You have new or worse trouble breathing. ? · You are dizzy or lightheaded, or you feel like you may faint. ? Watch closely for changes in your health, and be sure to contact your doctor if:  ? · You have a new or higher fever. ? · You are coughing more deeply or more often. ? · You are not getting better after 2 days (48 hours). ? · You do not get better as expected. Where can you learn more? Go to http://jessica-faustina.info/. Enter [id number] in the search box to learn more about \"Pneumonia: Care Instructions. \"  Current as of: May 12, 2017  Content Version: 11.4  © 8822-3532 [employer]. Care instructions adapted under license by [employer] (which disclaims liability or warranty for this information). If you have questions about a medical condition or this instruction, always ask your healthcare professional. [address] any warranty or liability for your use of this information.

## 2018-03-20 NOTE — ED PROVIDER NOTES
HPI Comments: 45 y.o. female with past medical history significant for DM, Asthma who presents from home with chief complaint of fever. Pt reports fever and chills since last night. She was evaluated at an urgent care today for symptoms where fever was noted (tmax: \"101\") with noted right lower lobe pneumonia. Pt was discharged home on Levaquin and an inhaler. Pt states that she took her first dose of antibiotic and 1 dose of Advil this evening without significant relief. She reports her fever rising to \"101.4\". While in ED, pt reports dry cough, headache (posterior aspect) and generalized myalgias. She states that she had a the flu 2/11/18 and had recovered prior to symptoms yesterday. She further reports being diagnosed with a UTI last month placed on Keflex. This was changed to [address] this month and she is to continue this medication until April 1. Pt denies nausea, vomiting, diarrhea, constipation, dysuria. There are no other acute medical concerns at this time. PCP: Ale Chavez MD    Note written by Loida Olea, as dictated by Aristeo Ray MD 11:40 PM    The history is provided by the patient. No  was used.         Past Medical History:   Diagnosis Date    Adverse effect of anesthesia     anxiety with waking up    Asthma     hx of asthma r/t allergies/emotional response    Diabetes ([facility] [address]     pre diabetic/ no meds    Hx of mammogram 10/2/12    Ill-defined condition     low blood pressure    Nausea & vomiting     Nephrolithiasis     right kidney stone    Other ill-defined conditions(799.89)     echocardiogram,12/2010,  has murmur    Thyroid disease     enlarged & inflamed thryoid/ hypo        Past Surgical History:   Procedure Laterality Date    ABDOMEN SURGERY PROC UNLISTED      laparoscopy to check for endometriosis    COLONOSCOPY N/A 2/26/2018    COL[facility]  2/26/2018         HX BUNIONECTOMY  2001    ,right bunion    HX HERNIA REPAIR      umbilical    HX HYSTERECTOMY  2007    laparoscopic hysterecomy, rectocele repair, umbilical hernia repair, sling    HX ORTHOPAEDIC Right 2001    foot-bunion with hardware    HX RECTOCELE REPAIR  2007    HX TONSILLECTOMY  age 8    HX UROLOGICAL  2007    urethral sling         Family History:   Problem Relation Age of Onset    Thyroid Disease Father     Cancer Father      CML    Colon Polyps Father     Thyroid Disease Brother     Thyroid Cancer Brother     Heart Disease Maternal Grandfather     Heart Disease Paternal Grandfather        Social History     Social History    Marital status:      Spouse name:     Number of children: 3    Years of education: N/A     Occupational History    Mammographer-Kahlil Gonzalez     Social History Main Topics    Smoking status: Former Smoker     Years: 10.00     Types: Cigarettes     Quit date: 7/13/1989    Smokeless tobacco: Never Used      Comment: used to smoke 2-3 cigarettes a day    Alcohol use No    Drug use: No    Sexual activity: Not Currently     Other Topics Concern    Exercise No     runs regularly     Social History Narrative    One son, two daughters         ALLERGIES: Pcn [penicillins]    Review of Systems   Constitutional: Positive for chills and fever. HENT: Negative for sore throat. Respiratory: Positive for cough. Cardiovascular: Negative for chest pain. Gastrointestinal: Negative for abdominal pain, constipation, diarrhea, nausea and vomiting. Genitourinary: Negative for dysuria. Neurological: Positive for headaches. All other systems reviewed and are negative. Vitals:    03/19/18 1949   BP: 108/69   Pulse: (!) 110   Resp: 16   Temp: 98.4 °F (36.9 °C)   SpO2: 94%   Weight: 73.7 kg (162 lb 6.4 oz)   Height: 5' 9\" (1.753 m)            Physical Exam   Nursing note and vitals reviewed.        CONSTITUTIONAL: Well-appearing; well-nourished; in no apparent distress  HEAD: Normocephalic; atraumatic  EYES: PERRL; EOM intact; conjunctiva and sclera are clear bilaterally. ENT: No rhinorrhea; normal pharynx with no tonsillar hypertrophy; mucous membranes pink/moist, no erythema, no exudate. NECK: Supple; non-tender; no cervical lymphadenopathy  CARD: Normal S1, S2; no murmurs, rubs, or gallops. Regular rate and rhythm. RESP: Normal respiratory effort; breath sounds clear and equal bilaterally; no wheezes, rhonchi, or rales. ABD: Normal bowel sounds; non-distended; non-tender; no palpable organomegaly, no masses, no bruits. Back Exam: Normal inspection; no vertebral point tenderness, no CVA tenderness. Normal range of motion. EXT: Normal ROM in all four extremities; non-tender to palpation; no swelling or deformity; distal pulses are normal, no edema. SKIN: Warm; dry; no rash. NEURO:Alert and oriented x 3, coherent, SHANNEN-XII grossly intact, sensory and motor are non-focal.      MDM  Number of Diagnoses or Management Options  Acute febrile illness:   Pneumonia of right lower lobe due to infectious organism [facility]):   Diagnosis management comments: Assessment: 57-year-old female, who presents with fever, chills, or cough or congestion with recent diagnosis of pneumonia. The patient appears hemodynamically stable at this time and looks well.she was just recently evaluated and placed on Levaquin. Plan: education and reassurance/ symptomatic treatment/ serial exam/ Monitor and Reevaluate.          Amount and/or Complexity of Data Reviewed  Clinical lab tests: ordered and reviewed  Tests in the radiology section of CPT®: ordered and reviewed  Tests in the medicine section of CPT®: reviewed and ordered  Discussion of test results with the performing providers: yes  Decide to obtain previous medical records or to obtain history from someone other than the patient: yes  Obtain history from someone other than the patient: yes  Review and summarize past medical records: yes  Discuss the patient with other providers: yes  Independent visualization of images, tracings, or specimens: yes          ED Course       Procedures     Progress Note:   Pt has been reexamined by Bari Zhou MD. Pt is feeling much better. Symptoms have improved. All available results have been reviewed with pt and any available family. Pt understands sx, dx, and tx in ED. Care plan has been outlined and questions have been answered. Pt is ready to go home. Will send home on Acute febrile illness/ pneumonia instructions. Prescription of albuterol inhaler, Tylenol, ibuprofen, prednisone. Continue Levaquin as previously instructed. Outpatient referral with PCP as needed. Written by Bari hZou MD,11:16 PM    .   .

## 2018-03-20 NOTE — TELEPHONE ENCOUNTER
Pt called around 630pm with c/o of not feeling better, increased fever. Pt states that she originally did not want to go the ED but decided she wanted to go because she feels worse after taking advil and Levaquin. Report called to [facility] ED, instructed pt that she would be triaged like any other pt in the ED.

## 2018-03-21 ENCOUNTER — OFFICE VISIT (OUTPATIENT)
Dept: INTERNAL MEDICINE CLINIC | Age: 53
End: 2018-03-21

## 2018-03-21 VITALS
WEIGHT: 160.2 LBS | RESPIRATION RATE: 17 BRPM | HEIGHT: 69 IN | TEMPERATURE: 98.5 F | DIASTOLIC BLOOD PRESSURE: 90 MMHG | BODY MASS INDEX: 23.73 KG/M2 | SYSTOLIC BLOOD PRESSURE: 120 MMHG | HEART RATE: 89 BPM | OXYGEN SATURATION: 94 %

## 2018-03-21 DIAGNOSIS — R05.9 COUGH: Primary | ICD-10-CM

## 2018-03-21 DIAGNOSIS — Z87.01 H/O: PNEUMONIA: ICD-10-CM

## 2018-03-21 LAB
BACTERIA SPEC CULT: ABNORMAL
CC UR VC: ABNORMAL
SERVICE CMNT-IMP: ABNORMAL

## 2018-03-21 RX ORDER — BENZONATATE 100 MG/1
100 CAPSULE ORAL
Qty: 30 CAP | Refills: 0 | Status: SHIPPED | OUTPATIENT
Start: 2018-03-21 | End: 2018-03-28

## 2018-03-21 NOTE — PROGRESS NOTES
Follow Up Visit    Mary Beth Madrigal is a 45 y.o. female. she presents for Pneumonia (follow up )    Patient comes to follow-up after having had a recent diagnosis of pneumonia. She had gone to an urgent care with complaints of low-grade temperatures cough and feeling tired and rundown. While there, she had a chest x-ray which documented evidence of a left lower lobe pneumonia. She was prescribed Levaquin and albuterol for her symptoms. She went home yet continued to feel poorly after the initial dosage of medication. She went back to the emergency room and was evaluated again. At that time, the decision was made to not change any of her treatment. She comes here to follow-up. She does tell me that she feels slightly better than she did at the last evaluation. Medications are continuing. Patient Active Problem List   Diagnosis Code    Hypothyroidism E03.9    Allergic rhinitis J30.9    Microscopic hematuria R31.29    Dyslipidemia E78.5    Dysuria R30.0    Prediabetes R73.03    Thyroid nodule E04.1    Hypertriglyceridemia E78.1    Neuropathic pain of both legs G57.91, G57.92         Prior to Admission medications    Medication Sig Start Date End Date Taking? Authorizing Provider   levoFLOXacin (LEVAQUIN) 750 mg tablet Take 1 Tab by mouth daily for 7 days. 3/19/18 3/26/18 Yes Maryan Gutierrez MD   acetaminophen (TYLENOL EXTRA STRENGTH) 500 mg tablet Take 2 Tabs by mouth every six (6) hours as needed for Pain or Fever. 3/19/18  Yes Ming Ludwig MD   ibuprofen (MOTRIN) 800 mg tablet Take 1 Tab by mouth every six (6) hours as needed for Pain (Fever). 3/19/18  Yes Ming Ludwig MD   albuterol (PROVENTIL HFA, VENTOLIN HFA, PROAIR HFA) 90 mcg/actuation inhaler Take 2 Puffs by inhalation every four (4) hours as needed for Wheezing. 3/19/18  Yes Ming Ludwig MD   cetirizine (ZYRTEC) 10 mg tablet Take 10 mg by mouth daily. Yes Historical Provider   zinc 50 mg tab tablet Take  by mouth daily.    Yes Historical Provider   ESTRACE 0.01 % (0.1 mg/gram) vaginal cream APPLY 1GM TO VAGINAL AREA EVERY DAY FOR 2 WKS AND THEN 2-3 X WEEKLY THEREAFTER 1/6/18  Yes Historical Provider   metFORMIN (GLUCOPHAGE) 500 mg tablet TAKE ONE TABLET BY MOUTH 2 TIMES A DAY WITH MEALS 1/22/18  Yes Tia Tracy MD   glucose blood VI test strips (BLOOD GLUCOSE TEST) strip Check BG 2-3 times/day 9/28/17  Yes Tia Tracy MD   LEVOXYL 88 mcg tablet Take 1 Tab by mouth Daily (before breakfast). BRAND MEDICALLY NECESSARY 9/5/17  Yes Caesar Epley, MD   TURMERIC ROOT EXTRACT PO Take  by mouth. Yes Historical Provider   Biotin 2,500 mcg cap Take 5,000 mcg by mouth daily. Yes Moraima Apodaca MD   cholecalciferol, vitamin D3, 2,000 unit tab Take 2,000 Units by mouth daily. Yes Historical Provider   magnesium oxide (MAG-OX) 400 mg tablet Take 400 mg by mouth daily. Yes Historical Provider   VITAMIN E, DL,TOCOPHERYL ACET, (VITAMIN E ACETATE) 400 unit cap capsule Take  by mouth daily. Yes Historical Provider   predniSONE (DELTASONE) 20 mg tablet Take 2 Tabs by mouth daily for 5 days. With Breakfast 3/19/18 3/24/18  Max Goode MD   guaiFENesin-codeine [facility]) 100-10 mg/5 mL solution Take 5 mL by mouth three (3) times daily as needed for Cough.  Max Daily Amount: 15 mL. 3/19/18   Max Goode MD   nitrofurantoin, macrocrystal-monohydrate, (MACROBID) 100 mg capsule TAKE 1 TABLET BY MOUTH EVERY DAY, TAKE ON EVEN NUMBERED MONTHS 1/4/18   Historical Provider   cephALEXin (KEFLEX) 250 mg capsule TAKE 1 CAPSULE BY MOUTH EVERY DAY-TAKE ON ODD NUMBERED MONTHS 1/4/18   Historical Provider         Health Maintenance   Topic Date Due    FOBT Q 1 YEAR AGE 50-75  03/30/2013    BREAST CANCER SCRN MAMMOGRAM  04/13/2019    PAP AKA CERVICAL CYTOLOGY  04/25/2022    DTaP/Tdap/Td series (2 - Td) 11/28/2026    Hepatitis C Screening  Completed    Influenza Age 5 to Adult  Completed       Review of Systems   Constitutional: Negative for chills and fever. Respiratory: Positive for cough (mild). Cardiovascular: Negative for chest pain. Musculoskeletal: Negative. Visit Vitals    /90 (BP 1 Location: Right arm, BP Patient Position: Sitting)    Pulse 89    Temp 98.5 °F (36.9 °C) (Oral)    Resp 17    Ht 5' 9\" (1.753 m)    Wt 160 lb 3.2 oz (72.7 kg)    SpO2 94%    BMI 23.66 kg/m2       Physical Exam   Constitutional: She is oriented to person, place, and time. No distress. Cardiovascular: Normal rate and regular rhythm. No murmur heard. Pulmonary/Chest: Effort normal and breath sounds normal. She has no wheezes. Neurological: She is alert and oriented to person, place, and time. ASSESSMENT/PLAN    Diagnoses and all orders for this visit:    1. Cough -discussed with the patient that the cough will likely resolve as she continues medications. I will provide Tessalon at this time to help with symptom relief. -     benzonatate (TESSALON) 100 mg capsule; Take 1 Cap by mouth three (3) times daily as needed for Cough for up to 7 days. 2. H/O: pneumonia -advised to continue Levaquin at this time. She is slightly improving. Follow-up Disposition:  Return if symptoms worsen or fail to improve.

## 2018-03-24 LAB
BACTERIA SPEC CULT: NORMAL
SERVICE CMNT-IMP: NORMAL

## 2018-03-26 ENCOUNTER — PATIENT OUTREACH (OUTPATIENT)
Dept: OTHER | Age: 53
End: 2018-03-26

## 2018-03-26 NOTE — PROGRESS NOTES
Incoming call from patient to report that she went to see her PCP on 3/21/18 and she is doing much better. Pneumonia appears to be resolving. Patient denies nausea, vomiting, diarrhea, fever. * Patient also reports that she finished course of Prednisone 3/24/18. Will take last dose of Levaquin today 3/26/18 and Darwin Loco on 4/1/18. Patient offered no other questions or concerns. Reminded her that I can be reached if needs arise.

## 2018-04-03 ENCOUNTER — PATIENT OUTREACH (OUTPATIENT)
Dept: OTHER | Age: 53
End: 2018-04-03

## 2018-04-03 NOTE — PROGRESS NOTES
SURYA follow up. Patient on with  [facility] ED visit for Pneumonia right lower lobe with D/C 3/19/18 . Contacted patient for transitions of care services. Verified  and address for HIPAA security. *Patient reports that she is doing better. Pneumonia appears to have resolved. Chart review: No further appts noted for PCP          Med changes:  Finished Macrobid on 18    Patient offers no further questions or concerns. Contact information given. Reminded that I can be reached if any needs arise. Next outreach: 18 to f/u Pneumonia and resolve episode.

## 2018-04-04 ENCOUNTER — TELEPHONE (OUTPATIENT)
Dept: ENDOCRINOLOGY | Age: 53
End: 2018-04-04

## 2018-04-04 NOTE — TELEPHONE ENCOUNTER
----- Message from Mario Angeles sent at 4/4/2018  4:17 PM EDT -----  Regarding: /telephone  Pt is requesting a call back from the nurse. Best contact [phone].

## 2018-04-05 ENCOUNTER — TELEPHONE (OUTPATIENT)
Dept: ENDOCRINOLOGY | Age: 53
End: 2018-04-05

## 2018-04-05 RX ORDER — BLOOD-GLUCOSE METER
EACH MISCELLANEOUS
Qty: 1 EACH | Refills: 0 | Status: SHIPPED | OUTPATIENT
Start: 2018-04-05 | End: 2018-09-12 | Stop reason: ALTCHOICE

## 2018-04-05 NOTE — TELEPHONE ENCOUNTER
She can take 1 tablet for now. However, if she has those symptoms I want her to check her blood sugar so we can tell if it really is the blood sugar causing the issue (it could be something else).

## 2018-04-05 NOTE — TELEPHONE ENCOUNTER
Pt stated that after her metformin was increase she was doing well up until she had pneumonia. For the past month, every afternoon she has felt clammy along with having headaches and she was told by a friend that the metformin was causing her blood sugars to drop. She then, she started back to taking only 1 tab daily and the symptoms stopped. Pt currently does not check her bs.

## 2018-04-05 NOTE — TELEPHONE ENCOUNTER
Marvin Fabian! Ms. Gray Neighbours called to say that went to the pharmacy to  her prescription for (Blood Glucose) and the Pharmacy said that they have not recieved anything as of yet. Ms. Gray Neighbours says that she has been waiting for a while. Thanks Mikel Dandy.

## 2018-04-05 NOTE — TELEPHONE ENCOUNTER
I thought she already had a meter. I sent scripts for meter and test strips to [facility] in September. I sent in the ones you pended anyway.

## 2018-04-24 ENCOUNTER — PATIENT OUTREACH (OUTPATIENT)
Dept: OTHER | Age: 53
End: 2018-04-24

## 2018-06-04 ENCOUNTER — HOSPITAL ENCOUNTER (OUTPATIENT)
Dept: MAMMOGRAPHY | Age: 53
Discharge: HOME | End: 2018-06-04
Attending: INTERNAL MEDICINE
Payer: COMMERCIAL

## 2018-06-04 DIAGNOSIS — Z12.39 SCREENING BREAST EXAMINATION: ICD-10-CM

## 2018-06-04 PROCEDURE — 77063 BREAST TOMOSYNTHESIS BI: CPT

## 2018-07-05 ENCOUNTER — TELEPHONE (OUTPATIENT)
Dept: INTERNAL MEDICINE CLINIC | Age: 53
End: 2018-07-05

## 2018-07-05 DIAGNOSIS — Z87.01 HISTORY OF PNEUMONIA: Primary | ICD-10-CM

## 2018-07-09 ENCOUNTER — HOSPITAL ENCOUNTER (OUTPATIENT)
Dept: GENERAL RADIOLOGY | Age: 53
Discharge: HOME | End: 2018-07-09
Payer: COMMERCIAL

## 2018-07-09 DIAGNOSIS — Z87.01 HISTORY OF PNEUMONIA: ICD-10-CM

## 2018-07-09 PROCEDURE — 71046 X-RAY EXAM CHEST 2 VIEWS: CPT

## 2018-08-04 LAB
CHOLEST SERPL-MCNC: 221 MG/DL (ref 100–199)
EST. AVERAGE GLUCOSE BLD GHB EST-MCNC: 117 MG/DL
HBA1C MFR BLD: 5.7 % (ref 4.8–5.6)
HDLC SERPL-MCNC: 38 MG/DL
LDLC SERPL CALC-MCNC: 129 MG/DL (ref 0–99)
T4 FREE SERPL-MCNC: 1.34 NG/DL (ref 0.82–1.77)
TRIGL SERPL-MCNC: 268 MG/DL (ref 0–149)
TSH SERPL DL<=0.005 MIU/L-ACNC: 1.11 UIU/ML (ref 0.45–4.5)
VLDLC SERPL CALC-MCNC: 54 MG/DL (ref 5–40)

## 2018-08-06 DIAGNOSIS — E03.9 ACQUIRED HYPOTHYROIDISM: ICD-10-CM

## 2018-08-06 DIAGNOSIS — R73.03 PREDIABETES: ICD-10-CM

## 2018-08-06 DIAGNOSIS — E78.1 HYPERTRIGLYCERIDEMIA: ICD-10-CM

## 2018-08-07 ENCOUNTER — OFFICE VISIT (OUTPATIENT)
Dept: ENDOCRINOLOGY | Age: 53
End: 2018-08-07

## 2018-08-07 VITALS
RESPIRATION RATE: 16 BRPM | HEIGHT: 69 IN | WEIGHT: 163 LBS | DIASTOLIC BLOOD PRESSURE: 72 MMHG | SYSTOLIC BLOOD PRESSURE: 99 MMHG | OXYGEN SATURATION: 100 % | HEART RATE: 100 BPM | BODY MASS INDEX: 24.14 KG/M2

## 2018-08-07 DIAGNOSIS — E78.1 HYPERTRIGLYCERIDEMIA: ICD-10-CM

## 2018-08-07 DIAGNOSIS — E04.1 THYROID NODULE: ICD-10-CM

## 2018-08-07 DIAGNOSIS — R73.03 PREDIABETES: ICD-10-CM

## 2018-08-07 DIAGNOSIS — E03.9 ACQUIRED HYPOTHYROIDISM: Primary | ICD-10-CM

## 2018-08-07 RX ORDER — BENZONATATE 100 MG/1
CAPSULE ORAL
COMMUNITY
End: 2018-09-12 | Stop reason: ALTCHOICE

## 2018-08-07 RX ORDER — OXYCODONE HYDROCHLORIDE 5 MG/1
CAPSULE ORAL
COMMUNITY
End: 2018-09-12 | Stop reason: ALTCHOICE

## 2018-08-07 RX ORDER — LEVOTHYROXINE SODIUM 88 UG/1
TABLET ORAL
Qty: 90 TAB | Refills: 3 | Status: SHIPPED | OUTPATIENT
Start: 2018-08-07 | End: 2018-11-26 | Stop reason: SDUPTHER

## 2018-08-07 RX ORDER — ALPRAZOLAM 2 MG/1
TABLET ORAL
COMMUNITY
End: 2018-09-12 | Stop reason: ALTCHOICE

## 2018-08-07 RX ORDER — FENOFIBRATE 48 MG/1
48 TABLET, COATED ORAL DAILY
Qty: 30 TAB | Refills: 5 | Status: SHIPPED | OUTPATIENT
Start: 2018-08-07 | End: 2018-08-24 | Stop reason: SDUPTHER

## 2018-08-07 NOTE — MR AVS SNAPSHOT
[address] [facility] 
[phone] Patient: Brandee Bagley MRN: [id number] EWJ:[id number] Visit Information Date & Time Provider Department Dept. Phone Encounter #  
 8/7/2018  8:30 AM Simona Hays, [address] [facility] [phone] Upcoming Health Maintenance Date Due FOBT Q 1 YEAR AGE 50-75 3/30/2013 Influenza Age 5 to Adult 8/1/2018 BREAST CANCER SCRN MAMMOGRAM 6/4/2020 PAP AKA CERVICAL CYTOLOGY 4/25/2022 DTaP/Tdap/Td series (2 - Td) 11/28/2026 Allergies as of 8/7/2018  Review Complete On: 8/7/2018 By: Shai Mariscal Severity Noted Reaction Type Reactions Pcn [Penicillins] Low 07/13/2011   Side Effect Nausea Only Orally only,can take intravenously Current Immunizations  Reviewed on 10/11/2017 Name Date Influenza Vaccine (Quad) PF 10/11/2017 Td 6/1/2011 Tdap 11/28/2016 Not reviewed this visit You Were Diagnosed With   
  
 Codes Comments Acquired hypothyroidism    -  Primary ICD-10-CM: E03.9 ICD-9-CM: 244.9 Prediabetes     ICD-10-CM: R73.03 
ICD-9-CM: 790.29 Hypertriglyceridemia     ICD-10-CM: E78.1 ICD-9-CM: 272.1 Vitals BP Pulse Resp Height(growth percentile) Weight(growth percentile) SpO2  
 99/72 100 16 5' 9\" (1.753 m) 163 lb (73.9 kg) 100% BMI OB Status Smoking Status 24.07 kg/m2 Hysterectomy Former Smoker Vitals History BMI and BSA Data Body Mass Index Body Surface Area 24.07 kg/m 2 1.9 m 2 Preferred Pharmacy Pharmacy Name Phone [id number] [id number], [address] [phone] Your Updated Medication List  
  
   
This list is accurate as of 8/7/18  9:09 AM.  Always use your most recent med list.  
  
  
  
  
 acetaminophen 500 mg tablet Commonly known as:  [address]  
 Take 2 Tabs by mouth every six (6) hours as needed for Pain or Fever. albuterol 90 mcg/actuation inhaler Commonly known as:  PROVENTIL HFA, VENTOLIN HFA, PROAIR HFA Take 2 Puffs by inhalation every four (4) hours as needed for Wheezing. ALPRAZolam 2 mg tablet Commonly known as:  XANAX  
alprazolam 2 mg tablet  
  
 benzonatate 100 mg capsule Commonly known as:  TESSALON  
benzonatate 100 mg capsule Biotin 2,500 mcg Cap Take 5,000 mcg by mouth daily. Blood-Glucose Meter Misc Commonly known as:  CONTOUR METER Use to check blood sugar once a day  
  
 cephALEXin 250 mg capsule Commonly known as:  Devoria Davy TAKE 1 CAPSULE BY MOUTH EVERY DAY-TAKE ON ODD NUMBERED MONTHS  
  
 cholecalciferol (vitamin D3) 2,000 unit Tab Take 2,000 Units by mouth daily. ESTRACE 0.01 % (0.1 mg/gram) vaginal cream  
Generic drug:  estradiol APPLY 1GM TO VAGINAL AREA EVERY DAY FOR 2 WKS AND THEN 2-3 X WEEKLY THEREAFTER  
  
 fenofibrate nanocrystallized 48 mg tablet Commonly known as:  [employer] Take 1 Tab by mouth daily. * glucose blood VI test strips strip Commonly known as:  blood glucose test  
Check BG 2-3 times/day * glucose blood VI test strips strip Commonly known as:  [employer] CONTOUR Use to check blood sugar once a day  
  
 guaiFENesin-codeine 100-10 mg/5 mL solution Commonly known as:  ROBITUSSIN AC Take 5 mL by mouth three (3) times daily as needed for Cough. Max Daily Amount: 15 mL. ibuprofen 800 mg tablet Commonly known as:  MOTRIN Take 1 Tab by mouth every six (6) hours as needed for Pain (Fever). LEVOXYL 88 mcg tablet Generic drug:  levothyroxine TAKE 1 TABLET BY MOUTH EVERY DAY BEFORE BREAKFAST  
  
 magnesium oxide 400 mg tablet Commonly known as:  MAG-OX Take 400 mg by mouth daily. metFORMIN 500 mg tablet Commonly known as:  GLUCOPHAGE  
TAKE ONE TABLET BY MOUTH 2 TIMES A DAY WITH MEALS  
  
 nitrofurantoin (macrocrystal-monohydrate) 100 mg capsule Commonly known as:  MACROBID  
TAKE 1 TABLET BY MOUTH EVERY DAY, TAKE ON EVEN NUMBERED MONTHS  
  
 oxyCODONE 5 mg capsule Commonly known as:  OXYIR  
oxycodone 5 mg tablet  
  
 vitamin E acetate 400 unit Cap capsule Take  by mouth daily. zinc 50 mg Tab tablet Take  by mouth daily. ZyrTEC 10 mg Cap Generic drug:  Cetirizine Zyrtec * Notice: This list has 2 medication(s) that are the same as other medications prescribed for you. Read the directions carefully, and ask your doctor or other care provider to review them with you. Prescriptions Sent to Pharmacy Refills  
 fenofibrate nanocrystallized (TRICOR) 48 mg tablet 5 Sig: Take 1 Tab by mouth daily. Class: Normal  
 Pharmacy: [facility][id number]- [address] Ph #: [phone] Route: Oral  
 LEVOXYL 88 mcg tablet 3 Sig: TAKE 1 TABLET BY MOUTH EVERY DAY BEFORE BREAKFAST Class: Normal  
 Pharmacy: [facility]/pharmacy #[id number]- [address] Ph #: [phone] We Performed the Following HEMOGLOBIN A1C WITH EAG [12146 CPT(R)] LIPID PANEL [29209 CPT(R)] TSH AND FREE T4 [39108 CPT(R)] To-Do List   
 08/20/2018 5:45 PM  
  Appointment with MASSAGE-ROGERIO at [address] OP PT ([phone])  
  
 08/27/2018 5:45 PM  
  Appointment with MASSAGE-ROGERIO at [facility] ([phone])  
  
 09/12/2018 5:45 PM  
  Appointment with MASSAGE-ROGERIO at [address] OP PT ([phone]) [facility]! Dear Florance Files: Thank you for requesting a Medikly account. Our records indicate that you already have an active Medikly account. You can access your account anytime at [email or web address] Did you know that you can access your hospital and ER discharge instructions at any time in Medikly?   You can also review all of your test results from your hospital stay or ER visit. Additional Information If you have questions, please visit the Frequently Asked Questions section of the Skyscanner website at [email or web address] Remember, Skyscanner is NOT to be used for urgent needs. For medical emergencies, dial 911. Now available from your iPhone and Android! Please provide this summary of care documentation to your next provider. Your primary care clinician is listed as Nickie Jacques. If you have any questions after today's visit, please call [phone].

## 2018-08-07 NOTE — PROGRESS NOTES
Endocrinology Visit    CC: hypothyroidism    History of present illness:  Julianne Parker is a 52 y.o. female here for f/u of hypothyroidism and prediabetes. She was previously followed by endocrinologist Dr Vicente Chawla. I saw her in initial consultation in January 2017 at which time TSH was slightly low on Levoxyl 88 mcg daily so I advised her to continue this dose but take 1/2 tablet one day/week. I also ordered a thyroid ultrasound which showed a small 1.5cm isoechoic nodule. Due to her family history of PTC, I recommended that she have an FNA but we discussed this further and she decided to wait and do f/u imaging instead. In May 2017, I started her on metformin due to h/o prediabetes and recent development of hypertriglyceridemia (TGs on recent labs returned very elevated >400, however repeat was 232 on 5/20/17). She was unable to tolerate the metformin XR, but is doing well with regular metformin 500mg once a day. Feels this has helped decrease the neuropathy symptoms and swelling in her feet. Most recent labs showed A1c at 5.7%, down from max of 5.8% in Jan. She does have a family history of diabetes. She has tried to reduce her bread and sweets intake. Only drinks water, eliminated sodas. She cut down on her milk and cheese intake over the past 6 months. She has not taken a statin before but at her last visit we discussed trying red yeast rice due to increase in LDL (she prefers supplements). Reports being unable to tolerate both fish oil and flaxseed oil in the past.     She was diagnosed with hypothyroidism over 15 years ago. She was initially treated with levothyroxine but later switched to [place] thyroid because it was more natural. She currently takes brand name Levoxyl (levothyroxine) 88 mcg daily. She takes this correctly on an empty stomach in the morning. Since switching back to levothyroxine she reports feeling much better.  She is still bothered by hair loss, seeing a dermatologist. [place] [employer]s gave her migraines. She denies racing heart, tremor, palpitations, heat or cold intolerance, constipation, diarrhea or energy changes.      ROS: see HPI for pertinent positives and negatives, otherwise a 7 system review is negative    Problem list:  Patient Active Problem List   Diagnosis Code    Hypothyroidism E03.9    Allergic rhinitis J30.9    Microscopic hematuria R31.29    Dyslipidemia E78.5    Dysuria R30.0    Prediabetes R73.03    Thyroid nodule E04.1    Hypertriglyceridemia E78.1    Neuropathic pain of both legs G57.91, G57.92       Medical history:  Past Medical History:   Diagnosis Date    Adverse effect of anesthesia     anxiety with waking up    Asthma     hx of asthma r/t allergies/emotional response    Diabetes ([facility] [address]     pre diabetic/ no meds    Hx of mammogram 10/2/12    Ill-defined condition     low blood pressure    Nausea & vomiting     Nephrolithiasis     right kidney stone    Other ill-defined conditions(799.89)     echocardiogram,12/2010,  has murmur    Thyroid disease     enlarged & inflamed thryoid/ hypo        Past surgical history:  Past Surgical History:   Procedure Laterality Date    ABDOMEN SURGERY PROC UNLISTED      laparoscopy to check for endometriosis    COLONOSCOPY N/A 2/26/2018    COLONOSCOPY performed by Rai Panchal MD at [facility]  2/26/2018         HX BUNIONECTOMY  2001    ,right bunion    HX HERNIA REPAIR      umbilical    HX HYSTERECTOMY  2007    laparoscopic hysterecomy, rectocele repair, umbilical hernia repair, sling    HX ORTHOPAEDIC Right 2001    foot-bunion with hardware    HX RECTOCELE REPAIR  2007    HX TONSILLECTOMY  age 8   [facility]  2007    urethral sling       Medications:    Current Outpatient Prescriptions:     Cetirizine (ZYRTEC) 10 mg cap, Zyrtec, Disp: , Rfl:     oxyCODONE (OXYIR) 5 mg capsule, oxycodone 5 mg tablet, Disp: , Rfl:     LEVOXYL 88 mcg tablet, TAKE 1 TABLET BY MOUTH EVERY DAY BEFORE BREAKFAST, Disp: 90 Tab, Rfl: 1    albuterol (PROVENTIL HFA, VENTOLIN HFA, PROAIR HFA) 90 mcg/actuation inhaler, Take 2 Puffs by inhalation every four (4) hours as needed for Wheezing., Disp: 1 Inhaler, Rfl: 1    zinc 50 mg tab tablet, Take  by mouth daily. , Disp: , Rfl:     ESTRACE 0.01 % (0.1 mg/gram) vaginal cream, APPLY 1GM TO VAGINAL AREA EVERY DAY FOR 2 WKS AND THEN 2-3 X WEEKLY THEREAFTER, Disp: , Rfl: 4    metFORMIN (GLUCOPHAGE) 500 mg tablet, TAKE ONE TABLET BY MOUTH 2 TIMES A DAY WITH MEALS (Patient taking differently: daily (with breakfast). TAKE ONE TABLET daily), Disp: 180 Tab, Rfl: 1    Biotin 2,500 mcg cap, Take 5,000 mcg by mouth daily. , Disp: , Rfl:     cholecalciferol, vitamin D3, 2,000 unit tab, Take 2,000 Units by mouth daily. , Disp: , Rfl:     magnesium oxide (MAG-OX) 400 mg tablet, Take 400 mg by mouth daily. , Disp: , Rfl:     VITAMIN E, DL,TOCOPHERYL ACET, (VITAMIN E ACETATE) 400 unit cap capsule, Take  by mouth daily. , Disp: , Rfl:     ALPRAZolam (XANAX) 2 mg tablet, alprazolam 2 mg tablet, Disp: , Rfl:     benzonatate (TESSALON) 100 mg capsule, benzonatate 100 mg capsule, Disp: , Rfl:     Blood-Glucose Meter (CONTOUR METER) misc, Use to check blood sugar once a day, Disp: 1 Each, Rfl: 0    glucose blood VI test strips (ASCENSIA CONTOUR) strip, Use to check blood sugar once a day, Disp: 50 Strip, Rfl: 11    acetaminophen (TYLENOL EXTRA STRENGTH) 500 mg tablet, Take 2 Tabs by mouth every six (6) hours as needed for Pain or Fever., Disp: 30 Tab, Rfl: 0    ibuprofen (MOTRIN) 800 mg tablet, Take 1 Tab by mouth every six (6) hours as needed for Pain (Fever). , Disp: 30 Tab, Rfl: 0    guaiFENesin-codeine (ROBITUSSIN AC) 100-10 mg/5 mL solution, Take 5 mL by mouth three (3) times daily as needed for Cough.  Max Daily Amount: 15 mL., Disp: 120 mL, Rfl: 0    nitrofurantoin, macrocrystal-monohydrate, (MACROBID) 100 mg capsule, TAKE 1 TABLET BY MOUTH EVERY DAY, TAKE ON EVEN NUMBERED MONTHS, Disp: , Rfl: 3    cephALEXin (KEFLEX) 250 mg capsule, TAKE 1 CAPSULE BY MOUTH EVERY DAY-TAKE ON ODD NUMBERED MONTHS, Disp: , Rfl: 4    glucose blood VI test strips (BLOOD GLUCOSE TEST) strip, Check BG 2-3 times/day, Disp: 100 Strip, Rfl: 11    Allergies: Allergies   Allergen Reactions    Pcn [Penicillins] Nausea Only     Orally only,can take intravenously       Social History:  Social History     Social History    Marital status: SINGLE     Spouse name:     Number of children: 3    Years of education: N/A     Occupational History    Mammographer-Kahlil Gonzalez     Social History Main Topics    Smoking status: Former Smoker     Years: 10.00     Types: Cigarettes     Quit date: 7/13/1989    Smokeless tobacco: Never Used      Comment: used to smoke 2-3 cigarettes a day    Alcohol use No    Drug use: No    Sexual activity: Not Currently     Other Topics Concern    Exercise No     runs regularly     Social History Narrative    One son, two daughters       Family History:  Family History   Problem Relation Age of Onset    Thyroid Disease Father     Cancer Father      CML    Colon Polyps Father     Thyroid Disease Brother     Thyroid Cancer Brother     Heart Disease Maternal Grandfather     Heart Disease Paternal Grandfather        Physical Exam:  Visit Vitals    BP 99/72    Pulse 100    Resp 16    Ht 5' 9\" (1.753 m)    Wt 163 lb (73.9 kg)    SpO2 100%    BMI 24.07 kg/m2     Gen: NAD  Heent: mucous membranes moist, no lid lag, stare or exophthalmos. No scleral or conjunctival injection. Extra ocular muscles intact . Thyroid: moves well with swallowing, normal size, normal texture, soft and mobile 1cm nodule palpated in left upper lobe  Pulmonary: clear to auscultation bilaterally, no wheezes/rhonchi or rales  Cardiovascular: regular rate and rhythm, no murmurs, rubs or gallops, good distal pulses  Extremities: no clubbing, cyanosis or edema.  No onocholysis   Neuro: no tremor of outstretched hands, reflexes are normal with normal relaxation phase. Normal gait, normal concentration  Skin: normal texture, warm and dry   Psyche: normal affect with good insight into her medical conditions      Pertinent lab review: Pertinent lab results from [facility] are transcribed and scanned into the medical record. Component      Latest Ref Rng & Units 8/3/2018 2/2/2018           7:48 AM  8:27 AM   TSH      0.450 - 4.500 uIU/mL 1.110 1.110   T4, Free      0.82 - 1.77 ng/dL 1.34 1.38     Lipids 5/19/17: total chol 202, HDL 41, ,     Component      Latest Ref Rng & Units 8/3/2018 2/2/2018           7:48 AM  8:28 AM   Cholesterol, total      100 - 199 mg/dL 221 (H) 221 (H)   Triglyceride      0 - 149 mg/dL 268 (H) 247 (H)   HDL Cholesterol      >39 mg/dL 38 (L) 37 (L)   VLDL, calculated      5 - 40 mg/dL 54 (H) 49 (H)   LDL, calculated      0 - 99 mg/dL 129 (H) 135 (H)       January 2017  May 2017  A1c   5.8%   5.5%  TSH   0.37   0.43  FT4   1.4   1.1  FT3   2.7    Lab Results   Component Value Date/Time    Hemoglobin A1c 5.7 (H) 08/03/2018 07:48 AM    Hemoglobin A1c 5.4 02/02/2018 08:27 AM    Hemoglobin A1c 5.8 (H) 03/11/2016 08:01 AM    Hemoglobin A1c, External 5.9 06/04/2015 10:41 AM      Ultrasound Jan 2017  INDICATION: hypothyroidism with family history of PTC in her brother, please  evaluate for thyroid nodules      Grayscale and color Doppler ultrasound of the thyroid.     There is no prior study for direct comparison.     The right thyroid lobe measures 5.6 cm x 2.7 cm x 2.4 cm. The left thyroid lobe  measures 5.9 cm x 2.4 cm x 1.9 cm. The thyroid isthmus measures 6 mm AP  dimension. The thyroid gland inhomogeneous and hypervascular. There is a 1.5 cm  x 7 mm x 1.2 cm nodule within the superior pole of the left thyroid lobe.      IMPRESSION: Mildly enlarged, inhomogeneous, hypervascular thyroid gland.  An 0.5  cm x 7 mm x 1.2 cm nodule within the superior pole of the left thyroid lobe.    Assessment and plan:  Josef Johnson is a 52 y.o. female here for hypothyroidism, HLD, and prediabetes. 1. Acquired hypothyroidism: Etiology unknown, suspect due to Hashimoto's given the appearance of her gland on US. She clinically appears euthyroid by exam on current dose of levothyroxine 88 mcg daily. Recent TSH/FT4 shows stability. Repeat TSH/FT4 with labs in 6 months. 2. Prediabetes: A1c was 5.8% in January 2017, decreased to 5.4% on low-dose metformin therapy. Pt feels symptomatically improved on metformin - but can only tolerate 500mg once a day. Recommend splitting into 250mg BID to help even out coverage since she is on the IR form. Recheck A1c before next visit in 6 months. 3. Hypertriglyceridemia: persistently elevated despite metformin and dietary carbohydrate reduction. She could not tolerate fish oil. Discussed there may be a genetic component so would recommend starting low-dose fenofibrate. Since starting red yeast rice, LDL has decreased marginally. Continue a low-carb diet + exercise. Repeat fasting lipids in 6 months. 4. Thyroid nodule: she has an isoechoic 1.5cm left sided nodule without suspicious features. We discussed possible FNA but have decided to do f/u ultrasonography instead given its benign appearance. Clinical neck exam is stable. Plan to repeat US next year (order at next visit). I spent 25 minutes with the patient today and > 50% of the time was spent counseling the patient about hypothyroidism: its etiology, clinical manifestations, diagnosis, and management. She will return in 6 months time. Thank you for the opportunity to partake in this patients care.   Sita Verduzco MD  [facility]  [address] [place] [address]

## 2018-08-07 NOTE — PROGRESS NOTES
Lab Results   Component Value Date/Time    TSH 1.110 08/03/2018 07:48 AM    T4, Free 1.34 08/03/2018 07:48 AM

## 2018-08-23 ENCOUNTER — PATIENT MESSAGE (OUTPATIENT)
Dept: ENDOCRINOLOGY | Age: 53
End: 2018-08-23

## 2018-08-24 RX ORDER — FENOFIBRATE 48 MG/1
48 TABLET, COATED ORAL DAILY
Qty: 90 TAB | Refills: 3 | Status: SHIPPED | OUTPATIENT
Start: 2018-08-24 | End: 2018-09-05 | Stop reason: SDUPTHER

## 2018-08-24 RX ORDER — METFORMIN HYDROCHLORIDE 500 MG/1
250 TABLET ORAL 2 TIMES DAILY WITH MEALS
Qty: 90 TAB | Refills: 3 | Status: SHIPPED | OUTPATIENT
Start: 2018-08-24 | End: 2018-11-26 | Stop reason: SDUPTHER

## 2018-08-24 NOTE — TELEPHONE ENCOUNTER
From: Richard Vogt  To: Norman Gomez MD  Sent: 8/23/2018 4:00 PM EDT  Subject: Visit Follow-Up Question    Hi Dr Efrain Ji,     I have done very well splitting the Metformin in half and taking half in the morning and half in the evening. I have also done very well with the Fenefibrate 48 mg and think I would do well with a higher dose. If you could call in a new prescription to [facility] Dr and make it a 90 day that would be great.    Hope everything is going well with you!! I really love the name aCro Argueta :)    Thanks,  Hang Braden

## 2018-09-05 ENCOUNTER — TELEPHONE (OUTPATIENT)
Dept: ENDOCRINOLOGY | Age: 53
End: 2018-09-05

## 2018-09-05 RX ORDER — FENOFIBRATE 145 MG/1
145 TABLET, COATED ORAL DAILY
Qty: 90 TAB | Refills: 1 | Status: SHIPPED | OUTPATIENT
Start: 2018-09-05 | End: 2019-03-05 | Stop reason: SDUPTHER

## 2018-09-05 NOTE — TELEPHONE ENCOUNTER
I apologize. I misunderstood and sent in the same dose. I will update the script with the higher dose.

## 2018-09-05 NOTE — TELEPHONE ENCOUNTER
Marvin Fabian! Ms. Nabil Rod called this morning and she said that she recently spoke with Dr. Kristal Carreon about increasing her dosage for [facility] and she has not heard back as of yet. Ms. Nabil Rod would like a call when you have a moment. Thanks Chelsy Holden.

## 2018-09-12 ENCOUNTER — OFFICE VISIT (OUTPATIENT)
Dept: PRIMARY CARE CLINIC | Age: 53
End: 2018-09-12

## 2018-09-12 VITALS
DIASTOLIC BLOOD PRESSURE: 73 MMHG | TEMPERATURE: 97.7 F | OXYGEN SATURATION: 97 % | RESPIRATION RATE: 16 BRPM | BODY MASS INDEX: 24.68 KG/M2 | SYSTOLIC BLOOD PRESSURE: 115 MMHG | WEIGHT: 166.6 LBS | HEIGHT: 69 IN | HEART RATE: 66 BPM

## 2018-09-12 DIAGNOSIS — R35.0 URINARY FREQUENCY: Primary | ICD-10-CM

## 2018-09-12 LAB
BILIRUB UR QL STRIP: NEGATIVE
GLUCOSE UR-MCNC: NEGATIVE MG/DL
KETONES P FAST UR STRIP-MCNC: NEGATIVE MG/DL
PH UR STRIP: 7 [PH] (ref 4.6–8)
PROT UR QL STRIP: NEGATIVE
SP GR UR STRIP: 1.01 (ref 1–1.03)
UA UROBILINOGEN AMB POC: NORMAL (ref 0.2–1)
URINALYSIS CLARITY POC: CLEAR
URINALYSIS COLOR POC: YELLOW
URINE BLOOD POC: NEGATIVE
URINE LEUKOCYTES POC: NEGATIVE
URINE NITRITES POC: NEGATIVE

## 2018-09-12 NOTE — PATIENT INSTRUCTIONS
Urinary Tract Infection in Women: Care Instructions  Your Care Instructions    A urinary tract infection, or UTI, is a general term for an infection anywhere between the kidneys and the urethra (where urine comes out). Most UTIs are bladder infections. They often cause pain or burning when you urinate. UTIs are caused by bacteria and can be cured with antibiotics. Be sure to complete your treatment so that the infection goes away. Follow-up care is a key part of your treatment and safety. Be sure to make and go to all appointments, and call your doctor if you are having problems. It's also a good idea to know your test results and keep a list of the medicines you take. How can you care for yourself at home? · Take your antibiotics as directed. Do not stop taking them just because you feel better. You need to take the full course of antibiotics. · Drink extra water and other fluids for the next day or two. This may help wash out the bacteria that are causing the infection. (If you have kidney, heart, or liver disease and have to limit fluids, talk with your doctor before you increase your fluid intake.)  · Avoid drinks that are carbonated or have caffeine. They can irritate the bladder. · Urinate often. Try to empty your bladder each time. · To relieve pain, take a hot bath or lay a heating pad set on low over your lower belly or genital area. Never go to sleep with a heating pad in place. To prevent UTIs  · Drink plenty of water each day. This helps you urinate often, which clears bacteria from your system. (If you have kidney, heart, or liver disease and have to limit fluids, talk with your doctor before you increase your fluid intake.)  · Urinate when you need to. · Urinate right after you have sex. · Change sanitary pads often. · Avoid douches, bubble baths, feminine hygiene sprays, and other feminine hygiene products that have deodorants.   · After going to the bathroom, wipe from front to back.  When should you call for help? Call your doctor now or seek immediate medical care if:    · Symptoms such as fever, chills, nausea, or vomiting get worse or appear for the first time.     · You have new pain in your back just below your rib cage. This is called flank pain.     · There is new blood or pus in your urine.     · You have any problems with your antibiotic medicine.    Watch closely for changes in your health, and be sure to contact your doctor if:    · You are not getting better after taking an antibiotic for 2 days.     · Your symptoms go away but then come back. Where can you learn more? Go to http://jessica-faustina.info/. Enter U004 in the search box to learn more about \"Urinary Tract Infection in Women: Care Instructions. \"  Current as of: May 12, 2017  Content Version: 11.7  © 2841-8958 [employer]. Care instructions adapted under license by [employer] (which disclaims liability or warranty for this information). If you have questions about a medical condition or this instruction, always ask your healthcare professional. [address] any warranty or liability for your use of this information.

## 2018-09-12 NOTE — PROGRESS NOTES
Subjective:     Kimberley Obrien is a 52 y.o. female who complains of frequency for 2 days. Patient denies flank pain, vomiting, fever, unusual vaginal discharge. Patient does have a history of recurrent UTI. Patient does not have a history of pyelonephritis. She is seen by Dr. Becca Freeman for chronic UTI. Patient Active Problem List   Diagnosis Code    Hypothyroidism E03.9    Allergic rhinitis J30.9    Microscopic hematuria R31.29    Dyslipidemia E78.5    Dysuria R30.0    Prediabetes R73.03    Thyroid nodule E04.1    Hypertriglyceridemia E78.1    Neuropathic pain of both legs G57.91, G57.92     Patient Active Problem List    Diagnosis Date Noted    Neuropathic pain of both legs 09/28/2017    Hypertriglyceridemia 05/18/2017    Thyroid nodule 01/23/2017    Prediabetes 01/16/2017    Dysuria 11/05/2015    Dyslipidemia 03/23/2013    Hypothyroidism 03/12/2013    Allergic rhinitis 03/12/2013    Microscopic hematuria 03/12/2013     Current Outpatient Prescriptions   Medication Sig Dispense Refill    fenofibrate nanocrystallized (TRICOR) 145 mg tablet Take 1 Tab by mouth daily. 90 Tab 1    metFORMIN (GLUCOPHAGE) 500 mg tablet Take 0.5 Tabs by mouth two (2) times daily (with meals). 90 Tab 3    Cetirizine (ZYRTEC) 10 mg cap Zyrtec      LEVOXYL 88 mcg tablet TAKE 1 TABLET BY MOUTH EVERY DAY BEFORE BREAKFAST 90 Tab 3    zinc 50 mg tab tablet Take  by mouth daily.  ESTRACE 0.01 % (0.1 mg/gram) vaginal cream APPLY 1GM TO VAGINAL AREA EVERY DAY FOR 2 WKS AND THEN 2-3 X WEEKLY THEREAFTER  4    Biotin 2,500 mcg cap Take 5,000 mcg by mouth daily.  cholecalciferol, vitamin D3, 2,000 unit tab Take 2,000 Units by mouth daily.  magnesium oxide (MAG-OX) 400 mg tablet Take 400 mg by mouth daily.  VITAMIN E, DL,TOCOPHERYL ACET, (VITAMIN E ACETATE) 400 unit cap capsule Take  by mouth daily.        Allergies   Allergen Reactions    Pcn [Penicillins] Nausea Only     Orally only,can take intravenously     Past Medical History:   Diagnosis Date    Adverse effect of anesthesia     anxiety with waking up    Asthma     hx of asthma r/t allergies/emotional response    Diabetes ([facility] [address]     pre diabetic/ no meds    Hx of mammogram 10/2/12    Ill-defined condition     low blood pressure    Nausea & vomiting     Nephrolithiasis     right kidney stone    Other ill-defined conditions(799.89)     echocardiogram,12/2010,  has murmur    Thyroid disease     enlarged & inflamed thryoid/ hypo      Past Surgical History:   Procedure Laterality Date    ABDOMEN SURGERY PROC UNLISTED      laparoscopy to check for endometriosis    COLONOSCOPY N/A 2/26/2018    COL[facility] at [facility]  2/26/2018         HX BUNIONECTOMY  2001    ,right bunion    HX HERNIA REPAIR      umbilical    HX HYSTERECTOMY  2007    laparoscopic hysterecomy, rectocele repair, umbilical hernia repair, sling    HX ORTHOPAEDIC Right 2001    foot-bunion with hardware    HX RECTOCELE REPAIR  2007    HX TONSILLECTOMY  age 8    HX UROLOGICAL  2007    urethral sling     Family History   Problem Relation Age of Onset    Thyroid Disease Father     Cancer Father      CML    Colon Polyps Father     Thyroid Disease Brother     Thyroid Cancer Brother     Heart Disease Maternal Grandfather     Heart Disease Paternal Grandfather      Social History   Substance Use Topics    Smoking status: Former Smoker     Years: 10.00     Types: Cigarettes     Quit date: 7/13/1989    Smokeless tobacco: Never Used      Comment: used to smoke 2-3 cigarettes a day    Alcohol use No        Review of Systems  Pertinent items are noted in HPI.     Objective:     Visit Vitals    /73 (BP 1 Location: Left arm, BP Patient Position: Sitting)    Pulse 66    Temp 97.7 °F (36.5 °C) (Oral)    Resp 16    Ht 5' 9\" (1.753 m)    Wt 166 lb 9.6 oz (75.6 kg)    SpO2 97%    BMI 24.6 kg/m2     General: alert, cooperative, no distress   Abdomen: soft, nontender, nondistended, no masses or organomegaly. Back:  CVA tenderness absent   :  defer exam     Laboratory:   Urine dipstick shows negative for all components. Micro exam: not done. Sent to the lab    Assessment/Plan:   Urinary frequency  Genital irritation     1. push fluids until culture is back  2. Maintain adequate hydration  3. May use OTC pyridium as desired, which will turn urine orange/red color  4. Follow up if symptoms not improving, and prn. ICD-10-CM ICD-9-CM    1. Urinary frequency R35.0 788.41 AMB POC URINALYSIS DIP STICK AUTO W/O MICRO      CULTURE, URINE   .

## 2018-09-12 NOTE — MR AVS SNAPSHOT
[address] [facility] [address]. 
[phone] Patient: Kimberley Obrien MRN: [id number] WTM:[birth year] Visit Information Date & Time Provider Department Dept. Phone Encounter #  
 9/12/2018  6:45 PM Brent Mcguire [address] [facility] [id number] Follow-up Instructions Return if symptoms worsen or fail to improve. Your Appointments 2/12/2019  8:30 AM  
Follow Up with MD Kodi Rothman Diabetes [facility] End[facility]) Appt Note: f/u diabetes cp0.00  
 [address] [facility] [place] [address] [facility] [address]  
[phone]  
  
   
 [address] [facility] [place] [address] [facility] [address] Upcoming Health Maintenance Date Due FOBT Q 1 YEAR AGE 50-75 3/30/2013 Influenza Age 5 to Adult 8/1/2018 BREAST CANCER SCRN MAMMOGRAM 6/4/2020 PAP AKA CERVICAL CYTOLOGY 4/25/2022 DTaP/Tdap/Td series (2 - Td) 11/28/2026 Allergies as of 9/12/2018  Review Complete On: 9/12/2018 By: Arianna Livingston LPN Severity Noted Reaction Type Reactions Pcn [Penicillins] Low 07/13/2011   Side Effect Nausea Only Orally only,can take intravenously Current Immunizations  Reviewed on 9/12/2018 Name Date Influenza Vaccine 9/11/2018 Influenza Vaccine (Quad) PF 10/11/2017 Td 6/1/2011 Tdap 11/28/2016 Reviewed by Arianna Livingston LPN on 0/50/9759 at  6:58 PM  
You Were Diagnosed With   
  
 Codes Comments Urinary frequency    -  Primary ICD-10-CM: R35.0 ICD-9-CM: 788.41 Vitals BP Pulse Temp Resp Height(growth percentile) Weight(growth percentile) 115/73 (BP 1 Location: Left arm, BP Patient Position: Sitting) 66 97.7 °F (36.5 °C) (Oral) 16 5' 9\" (1.753 m) 166 lb 9.6 oz (75.6 kg) SpO2 BMI OB Status Smoking Status 97% 24.6 kg/m2 Hysterectomy Former Smoker Vitals History BMI and BSA Data Body Mass Index Body Surface Area  
 24.6 kg/m 2 1.92 m 2 Preferred Pharmacy Pharmacy Name Phone [id number]- [id number], [address] [phone] Your Updated Medication List  
  
   
This list is accurate as of 9/12/18  7:15 PM.  Always use your most recent med list.  
  
  
  
  
 acetaminophen 500 mg tablet Commonly known as:  80 Jr Ginger Mena Se Take 2 Tabs by mouth every six (6) hours as needed for Pain or Fever. albuterol 90 mcg/actuation inhaler Commonly known as:  PROVENTIL HFA, VENTOLIN HFA, PROAIR HFA Take 2 Puffs by inhalation every four (4) hours as needed for Wheezing. ALPRAZolam 2 mg tablet Commonly known as:  XANAX  
alprazolam 2 mg tablet Biotin 2,500 mcg Cap Take 5,000 mcg by mouth daily. Blood-Glucose Meter Misc Commonly known as:  CONTOUR METER Use to check blood sugar once a day  
  
 cephALEXin 250 mg capsule Commonly known as:  Gwendlyn Campi TAKE 1 CAPSULE BY MOUTH EVERY DAY-TAKE ON ODD NUMBERED MONTHS  
  
 cholecalciferol (vitamin D3) 2,000 unit Tab Take 2,000 Units by mouth daily. ESTRACE 0.01 % (0.1 mg/gram) vaginal cream  
Generic drug:  estradiol APPLY 1GM TO VAGINAL AREA EVERY DAY FOR 2 WKS AND THEN 2-3 X WEEKLY THEREAFTER  
  
 fenofibrate nanocrystallized 145 mg tablet Commonly known as:  [employer] Take 1 Tab by mouth daily. * glucose blood VI test strips strip Commonly known as:  blood glucose test  
Check BG 2-3 times/day * glucose blood VI test strips strip Commonly known as:  [employer] CONTOUR Use to check blood sugar once a day  
  
 ibuprofen 800 mg tablet Commonly known as:  MOTRIN Take 1 Tab by mouth every six (6) hours as needed for Pain (Fever). LEVOXYL 88 mcg tablet Generic drug:  levothyroxine TAKE 1 TABLET BY MOUTH EVERY DAY BEFORE BREAKFAST  
  
 magnesium oxide 400 mg tablet Commonly known as:  MAG-OX Take 400 mg by mouth daily. metFORMIN 500 mg tablet Commonly known as:  GLUCOPHAGE Take 0.5 Tabs by mouth two (2) times daily (with meals). nitrofurantoin (macrocrystal-monohydrate) 100 mg capsule Commonly known as:  MACROBID  
TAKE 1 TABLET BY MOUTH EVERY DAY, TAKE ON EVEN NUMBERED MONTHS  
  
 oxyCODONE 5 mg capsule Commonly known as:  OXYIR  
oxycodone 5 mg tablet  
  
 vitamin E acetate 400 unit Cap capsule Take  by mouth daily. zinc 50 mg Tab tablet Take  by mouth daily. ZyrTEC 10 mg Cap Generic drug:  Cetirizine Zyrtec * Notice: This list has 2 medication(s) that are the same as other medications prescribed for you. Read the directions carefully, and ask your doctor or other care provider to review them with you. We Performed the Following AMB POC URINALYSIS DIP STICK AUTO W/O MICRO [79109 CPT(R)] Follow-up Instructions Return if symptoms worsen or fail to improve. To-Do List   
 10/09/2018 5:45 PM  
  Appointment with [facility]AGE[facility]LISA at [facility] ([phone])  
  
 10/23/2018 5:45 PM  
  Appointment with [facility] MASSAGE-LISA at [facility] ([phone]) Patient Instructions Urinary Tract Infection in Women: Care Instructions Your Care Instructions A urinary tract infection, or UTI, is a general term for an infection anywhere between the kidneys and the urethra (where urine comes out). Most UTIs are bladder infections. They often cause pain or burning when you urinate. UTIs are caused by bacteria and can be cured with antibiotics. Be sure to complete your treatment so that the infection goes away. Follow-up care is a key part of your treatment and safety. Be sure to make and go to all appointments, and call your doctor if you are having problems. It's also a good idea to know your test results and keep a list of the medicines you take. How can you care for yourself at home? · Take your antibiotics as directed. Do not stop taking them just because you feel better. You need to take the full course of antibiotics. · Drink extra water and other fluids for the next day or two. This may help wash out the bacteria that are causing the infection. (If you have kidney, heart, or liver disease and have to limit fluids, talk with your doctor before you increase your fluid intake.) · Avoid drinks that are carbonated or have caffeine. They can irritate the bladder. · Urinate often. Try to empty your bladder each time. · To relieve pain, take a hot bath or lay a heating pad set on low over your lower belly or genital area. Never go to sleep with a heating pad in place. To prevent UTIs · Drink plenty of water each day. This helps you urinate often, which clears bacteria from your system. (If you have kidney, heart, or liver disease and have to limit fluids, talk with your doctor before you increase your fluid intake.) · Urinate when you need to. · Urinate right after you have sex. · Change sanitary pads often. · Avoid douches, bubble baths, feminine hygiene sprays, and other feminine hygiene products that have deodorants. · After going to the bathroom, wipe from front to back. When should you call for help? Call your doctor now or seek immediate medical care if: 
  · Symptoms such as fever, chills, nausea, or vomiting get worse or appear for the first time.  
  · You have new pain in your back just below your rib cage. This is called flank pain.  
  · There is new blood or pus in your urine.  
  · You have any problems with your antibiotic medicine.  
 Watch closely for changes in your health, and be sure to contact your doctor if: 
  · You are not getting better after taking an antibiotic for 2 days.  
  · Your symptoms go away but then come back. Where can you learn more? Go to http://jessica-faustina.info/. Enter L715 in the search box to learn more about \"Urinary Tract Infection in Women: Care Instructions. \" Current as of: May 12, 2017 Content Version: 11.7 © 1882-4303 [employer]. Care instructions adapted under license by [employer] (which disclaims liability or warranty for this information). If you have questions about a medical condition or this instruction, always ask your healthcare professional. [address] any warranty or liability for your use of this information. Introducing [facility]! Dear Dawood Lopez: Thank you for requesting a [employer] account. Our records indicate that you already have an active [employer] account. You can access your account anytime at [email or web address] Did you know that you can access your hospital and ER discharge instructions at any time in [employer]? You can also review all of your test results from your hospital stay or ER visit. Additional Information If you have questions, please visit the Frequently Asked Questions section of the [employer] website at [email or web address] Remember, [employer] is NOT to be used for urgent needs. For medical emergencies, dial 911. Now available from your iPhone and Android! Please provide this summary of care documentation to your next provider. Your primary care clinician is listed as Rupert Arana. If you have any questions after today's visit, please call [phone].

## 2018-09-14 LAB — BACTERIA UR CULT: NORMAL

## 2018-09-14 NOTE — PROGRESS NOTES
Please call the patient and advise that her urine was negative for infection. If symptoms continue, follow up with urologist or PCP for further evaluation.   Thank You

## 2018-09-14 NOTE — PROGRESS NOTES
Called and spoke with patient, verified her name and date of birth, advised that urine was negative for infection and to follow up with urologist or pcp for further evaluation.  Pt voiced understanding of all information presented and had no further questions at this time

## 2018-11-07 ENCOUNTER — OFFICE VISIT (OUTPATIENT)
Dept: INTERNAL MEDICINE CLINIC | Age: 53
End: 2018-11-07

## 2018-11-07 VITALS
WEIGHT: 166.6 LBS | BODY MASS INDEX: 24.68 KG/M2 | HEIGHT: 69 IN | RESPIRATION RATE: 19 BRPM | OXYGEN SATURATION: 97 % | SYSTOLIC BLOOD PRESSURE: 98 MMHG | DIASTOLIC BLOOD PRESSURE: 70 MMHG | TEMPERATURE: 98.3 F | HEART RATE: 67 BPM

## 2018-11-07 DIAGNOSIS — E78.1 HYPERTRIGLYCERIDEMIA: ICD-10-CM

## 2018-11-07 DIAGNOSIS — Z00.00 WELL ADULT EXAM: Primary | ICD-10-CM

## 2018-11-07 DIAGNOSIS — L65.9 HAIR LOSS: ICD-10-CM

## 2018-11-07 NOTE — PROGRESS NOTES
Comprehensive Physical Examination Vera Johnson is a 52 y.o. female. she presents for a comprehensive physical examination. The patient has been experiencing hot flashes--Annabel root has helped her symptoms. The patient has had ongoing hypertriglyceridemia. She otherwise feels well. She is on fenofibrate per Dr. Sung John. She is tolerating this medication. We will plan to recheck lipids The patient reports that she has been losing hair. She is also feeling fatigued and tired. She is on \"310 nutrition\", she notes that her joint pain has improved while on this supplement. She is constipated at this time. Advised she try colace. Patient Active Problem List  
 Diagnosis Date Noted  Neuropathic pain of both legs 09/28/2017  Hypertriglyceridemia 05/18/2017  Thyroid nodule 01/23/2017  Prediabetes 01/16/2017  Dysuria 11/05/2015  Dyslipidemia 03/23/2013  Hypothyroidism 03/12/2013  Allergic rhinitis 03/12/2013  Microscopic hematuria 03/12/2013 Current Outpatient Medications Medication Sig Dispense Refill  fenofibrate nanocrystallized (TRICOR) 145 mg tablet Take 1 Tab by mouth daily. 90 Tab 1  
 metFORMIN (GLUCOPHAGE) 500 mg tablet Take 0.5 Tabs by mouth two (2) times daily (with meals). 90 Tab 3  
 Cetirizine (ZYRTEC) 10 mg cap Zyrtec  LEVOXYL 88 mcg tablet TAKE 1 TABLET BY MOUTH EVERY DAY BEFORE BREAKFAST 90 Tab 3  
 zinc 50 mg tab tablet Take  by mouth daily.  ESTRACE 0.01 % (0.1 mg/gram) vaginal cream APPLY 1GM TO VAGINAL AREA EVERY DAY FOR 2 WKS AND THEN 2-3 X WEEKLY THEREAFTER  4  
 Biotin 2,500 mcg cap Take 5,000 mcg by mouth daily.  cholecalciferol, vitamin D3, 2,000 unit tab Take 2,000 Units by mouth daily.  magnesium oxide (MAG-OX) 400 mg tablet Take 400 mg by mouth daily.  VITAMIN E, DL,TOCOPHERYL ACET, (VITAMIN E ACETATE) 400 unit cap capsule Take  by mouth daily. Allergies Allergen Reactions  Pcn [Penicillins] Nausea Only Orally only,can take intravenously Past Medical History:  
Diagnosis Date  Adverse effect of anesthesia   
 anxiety with waking up  Asthma   
 hx of asthma r/t allergies/emotional response  Diabetes ([address]   
 pre diabetic/ no meds  Hx of mammogram 10/2/12  Ill-defined condition   
 low blood pressure  Nausea & vomiting  Nephrolithiasis   
 right kidney stone  Other ill-defined conditions(799.89)   
 echocardiogram,2010,  has murmur  Thyroid disease   
 enlarged & inflamed thryoid/ hypo Past Surgical History:  
Procedure Laterality Date  ABDOMEN SURGERY PROC UNLISTED    
 laparoscopy to check for endometriosis  COLONOSCOPY,DIAGNOSTIC  2018  HX BUNIONECTOMY    
 ,right bunion  HX HERNIA REPAIR    
 umbilical  
 HX HYSTERECTOMY    
 laparoscopic hysterecomy, rectocele repair, umbilical hernia repair, sling  HX ORTHOPAEDIC Right   
 foot-bunion with hardware  HX RECTOCELE REPAIR  2007  HX TONSILLECTOMY  age 8  
 HX UROLOGICAL  2007  
 urethral sling Family History Problem Relation Age of Onset  Thyroid Disease Father  Cancer Father CML  Colon Polyps Father  Thyroid Disease Brother  Thyroid Cancer Brother  Heart Disease Maternal Grandfather  Heart Disease Paternal Grandfather Social History Tobacco Use  Smoking status: Former Smoker Years: 10.00 Types: Cigarettes Last attempt to quit: 1989 Years since quittin.3  Smokeless tobacco: Never Used  Tobacco comment: used to smoke 2-3 cigarettes a day Substance Use Topics  Alcohol use: No  
  
 
Health Maintenance Topic Date Due  Shingrix Vaccine Age 50> (1 of 2) 2013  PAP AKA CERVICAL CYTOLOGY  2020  BREAST CANCER SCRN MAMMOGRAM  2020  COLONOSCOPY  2023  DTaP/Tdap/Td series (2 - Td) 2026  Hepatitis C Screening  Completed  Influenza Age 5 to Adult  Completed Review of Systems Constitutional: Negative. Respiratory: Negative. Cardiovascular: Negative. Visit Vitals BP 98/70 (BP 1 Location: Right arm, BP Patient Position: Sitting) Pulse 67 Temp 98.3 °F (36.8 °C) (Oral) Resp 19 Ht 5' 9.02\" (1.753 m) Wt 166 lb 9.6 oz (75.6 kg) SpO2 97% BMI 24.59 kg/m² Physical Exam  
Constitutional: She is oriented to person, place, and time. No distress. Cardiovascular: Normal rate and regular rhythm. Pulmonary/Chest: Effort normal and breath sounds normal. She has no wheezes. Neurological: She is alert and oriented to person, place, and time. ASSESSMENT/PLAN Diagnoses and all orders for this visit: 
 
1. Well adult exam 
-     varicella-zoster recombinant, PF, (SHINGRIX, PF,) 50 mcg/0.5 mL susr injection; 0.5 mL by IntraMUSCular route once for 1 dose. 2. Hair loss -     SED RATE (ESR) 
-     CRP, HIGH SENSITIVITY -     RHEUMATOID FACTOR, QL 
-     Sherly Began 3. Hypertriglyceridemia -     LIPID PANEL 
-     METABOLIC PANEL, COMPREHENSIVE Follow-up Disposition: 
Return in about 6 months (around 5/7/2019) for Follow up.

## 2018-11-24 LAB
14.3.3 ETA, RHEUM. ARTHRITIS: <0.2 NG/ML
ALBUMIN SERPL-MCNC: 4.5 G/DL (ref 3.5–5.5)
ALBUMIN/GLOB SERPL: 1.7 {RATIO} (ref 1.2–2.2)
ALP SERPL-CCNC: 41 IU/L (ref 39–117)
ALT SERPL-CCNC: 26 IU/L (ref 0–32)
AST SERPL-CCNC: 25 IU/L (ref 0–40)
BILIRUB SERPL-MCNC: 0.4 MG/DL (ref 0–1.2)
BUN SERPL-MCNC: 15 MG/DL (ref 6–24)
BUN/CREAT SERPL: 16 (ref 9–23)
CALCIUM SERPL-MCNC: 9.5 MG/DL (ref 8.7–10.2)
CCP IGA+IGG SERPL IA-ACNC: <20 UNITS
CHLORIDE SERPL-SCNC: 104 MMOL/L (ref 96–106)
CHOLEST SERPL-MCNC: 193 MG/DL (ref 100–199)
CO2 SERPL-SCNC: 23 MMOL/L (ref 20–29)
CREAT SERPL-MCNC: 0.93 MG/DL (ref 0.57–1)
CRP SERPL HS-MCNC: 0.74 MG/L (ref 0–3)
ERYTHROCYTE [SEDIMENTATION RATE] IN BLOOD BY WESTERGREN METHOD: 2 MM/HR (ref 0–40)
GLOBULIN SER CALC-MCNC: 2.7 G/DL (ref 1.5–4.5)
GLUCOSE SERPL-MCNC: 83 MG/DL (ref 65–99)
HDLC SERPL-MCNC: 43 MG/DL
LDLC SERPL CALC-MCNC: 117 MG/DL (ref 0–99)
POTASSIUM SERPL-SCNC: 4.3 MMOL/L (ref 3.5–5.2)
PROT SERPL-MCNC: 7.2 G/DL (ref 6–8.5)
RHEUMATOID FACT SERPL-ACNC: 13.7 IU/ML (ref 0–13.9)
RHEUMATOID FACT SERPL-ACNC: 15.2 UNITS/ML
SODIUM SERPL-SCNC: 140 MMOL/L (ref 134–144)
TRIGL SERPL-MCNC: 166 MG/DL (ref 0–149)
VLDLC SERPL CALC-MCNC: 33 MG/DL (ref 5–40)

## 2018-11-26 RX ORDER — LEVOTHYROXINE SODIUM 88 UG/1
TABLET ORAL
Qty: 90 TAB | Refills: 3 | Status: SHIPPED | OUTPATIENT
Start: 2018-11-26 | End: 2020-02-07 | Stop reason: SDUPTHER

## 2018-11-26 RX ORDER — METFORMIN HYDROCHLORIDE 500 MG/1
250 TABLET ORAL 2 TIMES DAILY WITH MEALS
Qty: 90 TAB | Refills: 3 | Status: SHIPPED | OUTPATIENT
Start: 2018-11-26 | End: 2020-02-07 | Stop reason: SDUPTHER

## 2018-11-26 NOTE — TELEPHONE ENCOUNTER
----- Message from Zachery Deng sent at 11/26/2018 11:37 AM EST -----  Regarding: Dr Robert Cornejo refill  The pt called because she had her \"levoxyl\" and \"metformin\" refilled over the weekend, but they should have been for a 90 day supply instead of 30 days.       Best contact number is [phone]

## 2018-11-26 NOTE — TELEPHONE ENCOUNTER
Delia Bustamante 16 minutes ago (1:28 PM)      I spoke with patient and she requested a refill from her bottles.  I told her it would be taking care of.  (Routing comment)

## 2019-02-07 ENCOUNTER — HOSPITAL ENCOUNTER (OUTPATIENT)
Dept: PHYSICAL THERAPY | Age: 54
Discharge: HOME | End: 2019-02-07
Payer: COMMERCIAL

## 2019-02-07 LAB
CHOLEST SERPL-MCNC: 188 MG/DL (ref 100–199)
EST. AVERAGE GLUCOSE BLD GHB EST-MCNC: 120 MG/DL
HBA1C MFR BLD: 5.8 % (ref 4.8–5.6)
HDLC SERPL-MCNC: 47 MG/DL
LDLC SERPL CALC-MCNC: 116 MG/DL (ref 0–99)
T4 FREE SERPL-MCNC: 1.57 NG/DL (ref 0.82–1.77)
TRIGL SERPL-MCNC: 124 MG/DL (ref 0–149)
TSH SERPL DL<=0.005 MIU/L-ACNC: 2.51 UIU/ML (ref 0.45–4.5)
VLDLC SERPL CALC-MCNC: 25 MG/DL (ref 5–40)

## 2019-02-07 PROCEDURE — 97162 PT EVAL MOD COMPLEX 30 MIN: CPT | Performed by: PHYSICAL THERAPIST

## 2019-02-07 PROCEDURE — 97110 THERAPEUTIC EXERCISES: CPT | Performed by: PHYSICAL THERAPIST

## 2019-02-07 NOTE — PROGRESS NOTES
PT INITIAL EVALUATION NOTE 2-15 Patient Name: Ally Keene Date:2019 : 1965 [x]  Patient  Verified Payor: Nirali Santiago / Plan: Skyla Luther / Product Type: PPO / In time: 7:40am  Out time:8:30am 
Total Treatment Time (min): 50 Visit #: 1 Treatment Area: Left knee pain [M25.562] SUBJECTIVE Pain Level (0-10 scale): 1 (1-6/10) Any medication changes, allergies to medications, adverse drug reactions, diagnosis change, or new procedure performed?: [] No    [x] Yes (see summary sheet for update) Subjective: The patient reports that her left knee has been bothering her for awhile, but she went from running on a treadmill for a year to running outside one time in December for 4 miles (she normally runs 2-3 miles on the treadmill). It swelled up for several weeks starting the day after. She tried elliptical, then it swelled up again. She tried the elliptical last week and it didn't swell up. She started Diclofenac cream within the past few weeks. Shaking her knee and pulling it back will help. She can make it until the afternoon of work about 5 hours until it's irritated. OBJECTIVE/EXAMINATION Posture:  Scapular protraction bilaterally Other Observations:  Edema around medial joint line and fat pad on left knee Gait and Functional Mobility:  Decreased knee extension on left, slight antalgic Palpation: very tender to palpate lateral joint line and medial joint line/fat pad 
 
A/PROM Right Knee: Flex 135/140  Ext 0/0 A/PROM Left Knee: Flex 118/125   Ext -13/-5 Joint Mobility Assessment: hypomobile hip ER PROM bilaterally Flexibility: tight hamstrings and calves LOWER QUARTER   MUSCLE STRENGTH 
KEY       R  L 
0 - No Contraction  Knee ext  5  2+ 
1 - Trace            flex  5   
2 - Poor   Hip ext   nt  nt 3 - Fair          flex   4  4 
4 - Good         abd  3+  3+ 5 - Normal         add  nt  nt Ankle DF  5  5 
              PF  5  5 INV  5  5 EV  5  5 Neurological: Reflexes / Sensations: [facility] Special Tests:  
    Knee Varus/Valgus Stress: negative Anterior and Posterior Drawer: neg 
 
25 min Therapeutic Exercise:  [x] See flow sheet :  
Rationale: increase ROM, increase strength and improve coordination to improve the patients ability to perform daily activities. With 
 [x] TE 
 [] TA 
 [] neuro 
 [] other: Patient Education: [x] Review HEP [x] Progressed/Changed HEP based on:  
[x] positioning   [x] body mechanics   [] transfers   [] heat/ice application   
[] other:   
 
 
Other Objective/Functional Measures: FOTO=58 Pain Level (0-10 scale) post treatment: 0 
 
 
ASSESSMENT:  
  
[x]  See Plan of Care Savannah Haley, PT 2/7/2019

## 2019-02-07 NOTE — PROGRESS NOTES
[employer] [facility] [address] [facility] [address] Jose Salgado Phone: [phone] Fax: [phone] Plan of Care/Statement of Necessity for Physical Therapy Services  2-15 Patient name: Hayde Frederick  : 1965  Provider#: [id number] Referral source: Osbaldo Santos MD     
Medical/Treatment Diagnosis: Left knee pain [M25.562] Prior Hospitalization: see medical history Comorbidities: thyroid problems, pre-diabetes, asthma, allergies Prior Level of Function: 20 min of exercise at least 3x/wk Medications: Verified on Patient Summary List 
 
Start of Care: 19      Onset Date: 2018 The Plan of Care and following information is based on the information from the initial evaluation. Assessment/ key information: The patient presents with a chief complaint of left knee pain after running outside in 2018, resulting in weeks of swelling and discomfort. She has not had any imaging done, but much of her discomfort is along the joint line. She had negative ligament stress tests (varus/valgus/ACL/PCL), but was very tender to palpate along the medial and lateral joint lines and fat pad. Her signs and symptoms are consistent with probable meniscal derangement, as she is lacking full knee extension and flexion A/PROM, along with significant crepitus and continued edema. The patient will benefit from guided therapeutic interventions such as therex for strengthening and neuromuscular re-eduction, manual techniques for joint mobility and soft tissue extensibility, and modalities for pain management in order to improve functional mobility with daily activities.  
 
Evaluation Complexity History MEDIUM  Complexity : 1-2 comorbidities / personal factors will impact the outcome/ POC ; Examination MEDIUM Complexity : 3 Standardized tests and measures addressing body structure, function, activity limitation and / or participation in recreation ;Presentation MEDIUM Complexity : Evolving with changing characteristics  ; Clinical Decision Making MEDIUM Complexity : FOTO score of 26-74 Overall Complexity Rating: MEDIUM Problem List: pain affecting function, decrease ROM, decrease strength, edema affecting function, impaired gait/ balance, decrease ADL/ functional abilitiies, decrease activity tolerance, decrease flexibility/ joint mobility and decrease transfer abilities Treatment Plan may include any combination of the following: Therapeutic exercise, Therapeutic activities, Neuromuscular re-education, Physical agent/modality, Gait/balance training, Manual therapy, Patient education, Self Care training, Functional mobility training, Home safety training and Stair training Patient / Family readiness to learn indicated by: asking questions, trying to perform skills and interest 
Persons(s) to be included in education: patient (P) Barriers to Learning/Limitations: None Patient Goal (s): more mobility, able to run again Patient Self Reported Health Status: excellent Rehabilitation Potential: good Short Term Goals: To be accomplished in 2 treatments: 
                       1.) The patient will be independent with their HEP consistently for at least one week. Long Term Goals: To be accomplished in 16 treatments: 
                       1.) The patient will have at most 1/10 pain with daily activities. 2.) The patient will be able to run for at least 5 min without significant pain or increased swelling. 
                       3.) The patient will improve their FOTO score from 58 to at least 64 to show improvements in functional mobility. Frequency / Duration: Patient to be seen 2 times per week for 16 treatments. Patient/ Caregiver education and instruction: self care, activity modification, brace/ splint application and exercises 
 
[x]  Plan of care has been reviewed with TEVIN Cantor, PT 2/7/2019 ________________________________________________________________________ I certify that the above Therapy Services are being furnished while the patient is under my care. I agree with the treatment plan and certify that this therapy is necessary. [de-identified] Signature:____________________  Date:____________Time: _________

## 2019-02-12 ENCOUNTER — OFFICE VISIT (OUTPATIENT)
Dept: ENDOCRINOLOGY | Age: 54
End: 2019-02-12

## 2019-02-12 VITALS
SYSTOLIC BLOOD PRESSURE: 110 MMHG | HEIGHT: 69 IN | DIASTOLIC BLOOD PRESSURE: 76 MMHG | HEART RATE: 75 BPM | WEIGHT: 165.9 LBS | BODY MASS INDEX: 24.57 KG/M2

## 2019-02-12 DIAGNOSIS — E04.1 THYROID NODULE: ICD-10-CM

## 2019-02-12 DIAGNOSIS — R73.03 PREDIABETES: ICD-10-CM

## 2019-02-12 DIAGNOSIS — E78.1 HYPERTRIGLYCERIDEMIA: ICD-10-CM

## 2019-02-12 DIAGNOSIS — E03.9 ACQUIRED HYPOTHYROIDISM: Primary | ICD-10-CM

## 2019-02-12 RX ORDER — DICLOFENAC SODIUM 10 MG/G
GEL TOPICAL
COMMUNITY
Start: 2019-01-02 | End: 2019-11-12

## 2019-02-12 RX ORDER — LIOTHYRONINE SODIUM 5 UG/1
5 TABLET ORAL DAILY
Qty: 90 TAB | Refills: 1 | Status: SHIPPED | OUTPATIENT
Start: 2019-02-12 | End: 2019-08-01 | Stop reason: SDUPTHER

## 2019-02-12 NOTE — PROGRESS NOTES
Endocrinology Visit    CC: hypothyroidism    History of present illness:  Carolee Andrade is a 52 y.o. female here for f/u of hypothyroidism and prediabetes. She was previously followed by endocrinologist Dr Josue Chavez. I saw her in initial consultation in January 2017 at which time TSH was slightly low on Levoxyl 88 mcg daily so I advised her to continue this dose but take 1/2 tablet one day/week. I also ordered a thyroid ultrasound which showed a small 1.5cm isoechoic nodule. Due to her family history of PTC, I recommended that she have an FNA but we discussed this further and she decided to wait and do f/u imaging instead. In May 2017, I started her on metformin due to h/o prediabetes and recent development of hypertriglyceridemia (TGs on recent labs returned very elevated >400, however repeat was 232 on 5/20/17). She was unable to tolerate the metformin XR, but is doing well with regular metformin, now taking 250mg BID. Feels this has helped decrease the neuropathy symptoms and swelling in her feet. Most recent labs showed A1c at 5.8%. She does have a family history of diabetes. She has tried to reduce her bread and sweets intake. Only drinks water, eliminated sodas. She has not taken a statin before but she did try red yeast rice, which she had some GI s/e to, and it was not extremely effective at reducing her LDL. At her last visit I added fenofibrate for hyperTG and she is tolerating this well. TGs have improved significantly. She was diagnosed with hypothyroidism over 15 years ago. She was initially treated with levothyroxine but later switched to [place] thyroid because it was more natural. She currently takes brand name Levoxyl (levothyroxine) 88 mcg daily. She takes this correctly on an empty stomach in the morning. Since switching back to levothyroxine she reports feeling much better. She is still bothered by hair loss, seeing a dermatologist. Jorge Luis Harkins gave her migraines.  Also feels she can't lose weight despite increasing her efforts. She is down 1 lb since her last visit. She denies racing heart, tremor, palpitations, heat or cold intolerance, constipation, diarrhea or energy changes.      ROS: see HPI for pertinent positives and negatives, otherwise a 7 system review is negative    Problem list:  Patient Active Problem List   Diagnosis Code    Hypothyroidism E03.9    Allergic rhinitis J30.9    Microscopic hematuria R31.29    Dyslipidemia E78.5    Dysuria R30.0    Prediabetes R73.03    Thyroid nodule E04.1    Hypertriglyceridemia E78.1    Neuropathic pain of both legs G57.93       Medical history:  Past Medical History:   Diagnosis Date    Adverse effect of anesthesia     anxiety with waking up    Asthma     hx of asthma r/t allergies/emotional response    Diabetes ([facility] [address]     pre diabetic/ no meds    Hx of mammogram 10/2/12    Ill-defined condition     low blood pressure    Nausea & vomiting     Nephrolithiasis     right kidney stone    Other ill-defined conditions(799.89)     echocardiogram,12/2010,  has murmur    Thyroid disease     enlarged & inflamed thryoid/ hypo        Past surgical history:  Past Surgical History:   Procedure Laterality Date    ABDOMEN SURGERY PROC UNLISTED      laparoscopy to check for endometriosis    COLONOSCOPY N/A 2/26/2018    COL[facility]  2/26/2018         HX BUNIONECTOMY  2001    ,right bunion    HX HERNIA REPAIR      umbilical    HX HYSTERECTOMY  2007    laparoscopic hysterecomy, rectocele repair, umbilical hernia repair, sling    HX ORTHOPAEDIC Right 2001    foot-bunion with hardware    HX RECTOCELE REPAIR  2007    HX TONSILLECTOMY  age 8   [facility]  2007    urethral sling       Medications:    Current Outpatient Medications:     diclofenac (VOLTAREN) 1 % gel, Apply 4 grams to left knee three times daily, Disp: , Rfl:     LEVOXYL 88 mcg tablet, TAKE 1 TABLET BY MOUTH EVERY DAY BEFORE BREAKFAST, Disp: 90 Tab, Rfl: 3    metFORMIN (GLUCOPHAGE) 500 mg tablet, Take 0.5 Tabs by mouth two (2) times daily (with meals). , Disp: 90 Tab, Rfl: 3    fenofibrate nanocrystallized (TRICOR) 145 mg tablet, Take 1 Tab by mouth daily. , Disp: 90 Tab, Rfl: 1    Cetirizine (ZYRTEC) 10 mg cap, Zyrtec, Disp: , Rfl:     ESTRACE 0.01 % (0.1 mg/gram) vaginal cream, APPLY 1GM TO VAGINAL AREA EVERY DAY FOR 2 WKS AND THEN 2-3 X WEEKLY THEREAFTER, Disp: , Rfl: 4    cholecalciferol, vitamin D3, 2,000 unit tab, Take 2,000 Units by mouth daily. , Disp: , Rfl:     magnesium oxide (MAG-OX) 400 mg tablet, Take 400 mg by mouth daily. , Disp: , Rfl:     VITAMIN E, DL,TOCOPHERYL ACET, (VITAMIN E ACETATE) 400 unit cap capsule, Take  by mouth daily. , Disp: , Rfl:     zinc 50 mg tab tablet, Take  by mouth daily. , Disp: , Rfl:     Biotin 2,500 mcg cap, Take 5,000 mcg by mouth daily. , Disp: , Rfl:     Allergies:   Allergies   Allergen Reactions    Pcn [Penicillins] Nausea Only     Orally only,can take intravenously       Social History:  Social History     Socioeconomic History    Marital status: SINGLE     Spouse name:     Number of children: 3    Years of education: Not on file    Highest education level: Not on file   Social Needs    Financial resource strain: Not on file    Food insecurity - worry: Not on file    Food insecurity - inability: Not on file   [employer] needs - medical: Not on file   [employer] needs - non-medical: Not on file   Occupational History    Occupation: MammographerPete     Employer: [employer]   Tobacco Use    Smoking status: Former Smoker     Years: 10.00     Types: Cigarettes     Last attempt to quit: 1989     Years since quittin.6    Smokeless tobacco: Never Used    Tobacco comment: used to smoke 2-3 cigarettes a day   Substance and Sexual Activity    Alcohol use: No    Drug use: No    Sexual activity: Not Currently   Other Topics Concern     Service Not Asked    Blood Transfusions Not Asked    Caffeine Concern Not Asked    Occupational Exposure Not Asked    Hobby Hazards Not Asked    Sleep Concern Not Asked    Stress Concern Not Asked    Weight Concern Not Asked    Special Diet Not Asked    Back Care Not Asked    Exercise No     Comment: runs regularly    Bike Helmet Not Asked   [address] Not Asked    Self-Exams Not Asked   Social History Narrative    One son, two daughters       Family History:  Family History   Problem Relation Age of Onset    Thyroid Disease Father     Cancer Father         CML    Colon Polyps Father     Thyroid Disease Brother     Thyroid Cancer Brother     Heart Disease Maternal Grandfather     Heart Disease Paternal Grandfather        Physical Exam:  Visit Vitals  /76 (BP 1 Location: Left arm, BP Patient Position: Sitting)   Pulse 75   Ht 5' 9\" (1.753 m)   Wt 165 lb 14.4 oz (75.3 kg)   BMI 24.50 kg/m²     Gen: NAD  Heent: mucous membranes moist, no lid lag, stare or exophthalmos. No scleral or conjunctival injection. Extra ocular muscles intact . Pulmonary: unlabored respiratoins  Neuro: no focal deficits. Normal gait, normal concentration  Skin: normal texture, warm and dry   Psych: normal affect with good insight into her medical conditions      Pertinent lab review: Pertinent lab results from [facility] are transcribed and scanned into the medical record.     Component      Latest Ref Rng & Units 2/6/2019 8/3/2018 2/2/2018           8:03 AM  7:48 AM  8:27 AM   TSH      0.450 - 4.500 uIU/mL 2.510 1.110 1.110   T4, Free      0.82 - 1.77 ng/dL 1.57 1.34 1.38     Lipids 5/19/17: total chol 202, HDL 41, ,     Component      Latest Ref Rng & Units 2/6/2019 11/16/2018 8/3/2018 2/2/2018           8:03 AM  7:50 AM  7:48 AM  8:28 AM   Cholesterol, total      100 - 199 mg/dL 188 193 221 (H) 221 (H)   Triglyceride      0 - 149 mg/dL 124 166 (H) 268 (H) 247 (H)   HDL Cholesterol >39 mg/dL 47 43 38 (L) 37 (L)   VLDL, calculated      5 - 40 mg/dL 25 33 54 (H) 49 (H)   LDL, calculated      0 - 99 mg/dL 116 (H) 117 (H) 129 (H) 135 (H)       January 2017  May 2017  A1c   5.8%   5.5%  TSH   0.37   0.43  FT4   1.4   1.1  FT3   2.7    Lab Results   Component Value Date/Time    Hemoglobin A1c 5.8 (H) 02/06/2019 08:03 AM    Hemoglobin A1c 5.7 (H) 08/03/2018 07:48 AM    Hemoglobin A1c 5.4 02/02/2018 08:27 AM    Hemoglobin A1c, External 5.9 06/04/2015 10:41 AM      Ultrasound Jan 2017  INDICATION: hypothyroidism with family history of PTC in her brother, please  evaluate for thyroid nodules      Grayscale and color Doppler ultrasound of the thyroid.     There is no prior study for direct comparison.     The right thyroid lobe measures 5.6 cm x 2.7 cm x 2.4 cm. The left thyroid lobe  measures 5.9 cm x 2.4 cm x 1.9 cm. The thyroid isthmus measures 6 mm AP  dimension. The thyroid gland inhomogeneous and hypervascular. There is a 1.5 cm  x 7 mm x 1.2 cm nodule within the superior pole of the left thyroid lobe.      IMPRESSION: Mildly enlarged, inhomogeneous, hypervascular thyroid gland. An 0.5  cm x 7 mm x 1.2 cm nodule within the superior pole of the left thyroid lobe. Assessment and plan:  Fabi Jalloh is a 52 y.o. female here for hypothyroidism, HLD, and prediabetes. 1. Acquired hypothyroidism: Etiology unknown, suspect due to Hashimoto's given the appearance of her gland on US. She clinically appears euthyroid by exam on current dose of levothyroxine 88 mcg daily, but TSH has increased despite no change in dose. Discussed that this may be due to stopping biotin, but given her c/o hair loss and difficulty losing weight I think a trial of adding Cytomel is warranted. Will continue levothyroxine 88 mcg daily and add Cytomel 5 mcg daily. Repeat TSH with FT3 and FT4 in 3 months. 2. Prediabetes: A1c is 5.8% on low-dose metformin therapy.  Continue metformin 250mg BID and moderate carbohydrate intake. Repeat A1c in 6 months. 3. Hypertriglyceridemia: much improved since starting fenofibrate. Continue this and a low-carb diet + exercise. Repeat fasting lipids in 6-12 months. 4. Thyroid nodule: she has an isoechoic 1.5cm left sided nodule without suspicious features. We discussed possible FNA but have decided to do f/u ultrasonography instead given its benign appearance. Clinical neck exam is stable. Will order f/u US today. I spent 25 minutes with the patient today and > 50% of the time was spent counseling the patient about hypothyroidism: its etiology, clinical manifestations, diagnosis, and management. She will return in 6 months time. Thank you for the opportunity to partake in this patients care.   Rhys De Leon MD  [facility]  [facility]

## 2019-02-13 ENCOUNTER — HOSPITAL ENCOUNTER (OUTPATIENT)
Dept: PHYSICAL THERAPY | Age: 54
Discharge: HOME | End: 2019-02-13
Payer: COMMERCIAL

## 2019-02-13 PROCEDURE — 97110 THERAPEUTIC EXERCISES: CPT

## 2019-02-13 NOTE — PROGRESS NOTES
PT DAILY TREATMENT NOTE 2-15 Patient Name: Hyacinth Sky Date:2019 : 1965 [x]  Patient  Verified Payor: Stefania Greenwood / Plan: Yulia Adorno / Product Type: PPO / In time:700  Out time:802 Total Treatment Time (min): 62 Visit #:  2 Treatment Area: Left knee pain [M25.562] SUBJECTIVE Pain Level (0-10 scale): 2/10 Any medication changes, allergies to medications, adverse drug reactions, diagnosis change, or new procedure performed?: [x] No    [] Yes (see summary sheet for update) Subjective functional status/changes:   [] No changes reported Patient reports she feels like the exercises are helping. OBJECTIVE 62 min Therapeutic Exercise:  [x] See flow sheet :  
Rationale: increase ROM and increase strength to improve the patients ability to perform ADLs and reduce pain levels With 
 [] TE 
 [] TA 
 [] neuro 
 [] other: Patient Education: [x] Review HEP [] Progressed/Changed HEP based on:  
[] positioning   [] body mechanics   [] transfers   [] heat/ice application   
[] other:   
 
Other Objective/Functional Measures: none noted Pain Level (0-10 scale) post treatment: 0/10 ASSESSMENT/Changes in Function:  
Will expereince extension lag while performing SLR towards the end due to fatigue. Slight discomfort with knee  While performing knee extension. Good stability both static and dynamic. Will continue to progress as tolerated. Patient will continue to benefit from skilled PT services to modify and progress therapeutic interventions, address functional mobility deficits, address ROM deficits, address strength deficits, analyze and address soft tissue restrictions, analyze and cue movement patterns and analyze and modify body mechanics/ergonomics to attain remaining goals. [x]  See Plan of Care 
[]  See progress note/recertification 
[]  See Discharge Summary Progress towards goals / Updated goals: Patient is progressing towards goals. PLAN [x]  Upgrade activities as tolerated     [x]  Continue plan of care [x]  Update interventions per flow sheet      
[]  Discharge due to:_ 
[]  Other:_ Anthony [facility] Isom 2/13/2019

## 2019-02-15 ENCOUNTER — HOSPITAL ENCOUNTER (OUTPATIENT)
Dept: PHYSICAL THERAPY | Age: 54
Discharge: HOME | End: 2019-02-15
Payer: COMMERCIAL

## 2019-02-15 PROCEDURE — 97110 THERAPEUTIC EXERCISES: CPT

## 2019-02-15 NOTE — PROGRESS NOTES
PT DAILY TREATMENT NOTE -15 Patient Name: Yandy Gruber Date:2/15/2019 : 1965 [x]  Patient  Verified Payor: Diane Matos / Plan: Vladimir Cody / Product Type: PPO / In time:704  Out time:806 Total Treatment Time (min): 62 Visit #:  3 Treatment Area: Left knee pain [M25.562] SUBJECTIVE Pain Level (0-10 scale): 2/10 Any medication changes, allergies to medications, adverse drug reactions, diagnosis change, or new procedure performed?: [x] No    [] Yes (see summary sheet for update) Subjective functional status/changes:   [] No changes reported Patient reports she feels much better after last visit, unable to perform HEP due to having a cold. OBJECTIVE 62 min Therapeutic Exercise:  [x] See flow sheet :  
Rationale: increase ROM and increase strength to improve the patients ability to perform ADLs and reduce pain levels With 
 [] TE 
 [] TA 
 [] neuro 
 [] other: Patient Education: [x] Review HEP [] Progressed/Changed HEP based on:  
[] positioning   [] body mechanics   [] transfers   [] heat/ice application   
[] other:   
 
Other Objective/Functional Measures: none noted Pain Level (0-10 scale) post treatment: 0/10 ASSESSMENT/Changes in Function:  
Will need cueing in order to reduce extension lag while performing single leg raises. Good stability on uneven surfaces both static and dynamic. Will continue to progress as tolerated. Patient will continue to benefit from skilled PT services to modify and progress therapeutic interventions, address functional mobility deficits, address ROM deficits, address strength deficits, analyze and address soft tissue restrictions, analyze and cue movement patterns and analyze and modify body mechanics/ergonomics to attain remaining goals. [x]  See Plan of Care 
[]  See progress note/recertification 
[]  See Discharge Summary Progress towards goals / Updated goals: Patient is progressing towards goals, will continue to strengthen hip stabilizers in order to reduce pain levels. PLAN [x]  Upgrade activities as tolerated     [x]  Continue plan of care [x]  Update interventions per flow sheet      
[]  Discharge due to:_ 
[]  Other:_ Rubio Aguila 2/15/2019

## 2019-02-20 ENCOUNTER — HOSPITAL ENCOUNTER (OUTPATIENT)
Dept: PHYSICAL THERAPY | Age: 54
Discharge: HOME | End: 2019-02-20
Payer: COMMERCIAL

## 2019-02-20 PROCEDURE — 97110 THERAPEUTIC EXERCISES: CPT

## 2019-02-20 NOTE — PROGRESS NOTES
PT DAILY TREATMENT NOTE 2-15 Patient Name: Hayde Frederick Date:2019 : 1965 [x]  Patient  Verified Payor: Fadumo Soto / Plan: Chelsi Alfonso / Product Type: PPO / In time:701  Out time:757 Total Treatment Time (min): 56 Visit #:  4 Treatment Area: Left knee pain [M25.562] SUBJECTIVE Pain Level (0-10 scale): 3/10 Any medication changes, allergies to medications, adverse drug reactions, diagnosis change, or new procedure performed?: [x] No    [] Yes (see summary sheet for update) Subjective functional status/changes:   [] No changes reported Patient reports she tried the elliptical on Monday for 30 minutes which she felt fine while doing it, however after a few hours she began to experience pain which kept her up though the night. OBJECTIVE 56 min Therapeutic Exercise:  [x] See flow sheet :  
Rationale: increase ROM and increase strength to improve the patients ability to perform ADLs and reduce pain levels With 
 [] TE 
 [] TA 
 [] neuro 
 [] other: Patient Education: [x] Review HEP [] Progressed/Changed HEP based on:  
[] positioning   [] body mechanics   [] transfers   [] heat/ice application   
[] other:   
  
Other Objective/Functional Measures: none noted Pain Level (0-10 scale) post treatment: 2/10 ASSESSMENT/Changes in Function:  
Continues to experience slight discomfort while performing quad sets, but has no issues with SAQs. Good dynamic stability on uneven surfaces. Will continue to progress as tolerated. Patient will continue to benefit from skilled PT services to modify and progress therapeutic interventions, address functional mobility deficits, address ROM deficits, address strength deficits, analyze and address soft tissue restrictions, analyze and cue movement patterns and analyze and modify body mechanics/ergonomics to attain remaining goals. [x]  See Plan of Care 
[]  See progress note/recertification []  See Discharge Summary Progress towards goals / Updated goals: 
Patient is progressing towards goals, will continue to strengthen hip stabilizers in order to reduce erik PLAN [x]  Upgrade activities as tolerated     [x]  Continue plan of care [x]  Update interventions per flow sheet      
[]  Discharge due to:_ 
[]  Other:_ Sam Aguila 2/20/2019

## 2019-02-22 ENCOUNTER — HOSPITAL ENCOUNTER (OUTPATIENT)
Dept: PHYSICAL THERAPY | Age: 54
Discharge: HOME | End: 2019-02-22
Payer: COMMERCIAL

## 2019-02-22 PROCEDURE — 97110 THERAPEUTIC EXERCISES: CPT

## 2019-02-22 NOTE — PROGRESS NOTES
PT DAILY TREATMENT NOTE 2-15 Patient Name: Luis Lee Date:2019 : 1965 [x]  Patient  Verified Payor: Ángel Estevez / Plan: Isabella Mccabe / Product Type: PPO / In time:702  Out time:800 Total Treatment Time (min): 58 Visit #:  0 Treatment Area: Left knee pain [M25.562] SUBJECTIVE Pain Level (0-10 scale): 1/10 Any medication changes, allergies to medications, adverse drug reactions, diagnosis change, or new procedure performed?: [x] No    [] Yes (see summary sheet for update) Subjective functional status/changes:   [] No changes reported Patient reports she tried to break up the elliptical in 3 sets of 10 which helped reduce the pain and didn't cause any irritation late at night OBJECTIVE 58 min Therapeutic Exercise:  [x] See flow sheet :  
Rationale: increase ROM and increase strength to improve the patients ability to perform ADLs and reduce pain levels With 
 [] TE 
 [] TA 
 [] neuro 
 [] other: Patient Education: [x] Review HEP [] Progressed/Changed HEP based on:  
[] positioning   [] body mechanics   [] transfers   [] heat/ice application   
[] other:   
 
Other Objective/Functional Measures: none noted Pain Level (0-10 scale) post treatment: 2/10 ASSESSMENT/Changes in Function:  
Good form while performing sit to stands, but will be quad heavy while performing bosu squats. Slight discomfort while performing clocks. Will continue progress as tolerated. Patient will continue to benefit from skilled PT services to modify and progress therapeutic interventions, address functional mobility deficits, address ROM deficits, address strength deficits, analyze and address soft tissue restrictions, analyze and cue movement patterns and analyze and modify body mechanics/ergonomics to attain remaining goals. [x]  See Plan of Care 
[]  See progress note/recertification 
[]  See Discharge Summary Progress towards goals / Updated goals: Patient is progressing towards goals, will continue to strengthen the hip stabilizers in order to reduce pain levels. PLAN [x]  Upgrade activities as tolerated     [x]  Continue plan of care [x]  Update interventions per flow sheet      
[]  Discharge due to:_ 
[]  Other:_ Emy Aguila 2/22/2019

## 2019-02-25 ENCOUNTER — HOSPITAL ENCOUNTER (OUTPATIENT)
Dept: PHYSICAL THERAPY | Age: 54
Discharge: HOME | End: 2019-02-25
Payer: COMMERCIAL

## 2019-02-25 PROCEDURE — 97110 THERAPEUTIC EXERCISES: CPT

## 2019-02-25 NOTE — PROGRESS NOTES
PT DAILY TREATMENT NOTE 2-15 Patient Name: Mckenzie Beltran Date:2019 : 1965 [x]  Patient  Verified Payor: Magalys Lux / Plan: Otoniel Soliman / Product Type: PPO / In time:700  Out time:803 Total Treatment Time (min): 63 Visit #:  6 Treatment Area: Left knee pain [M25.562] SUBJECTIVE Pain Level (0-10 scale): 210 Any medication changes, allergies to medications, adverse drug reactions, diagnosis change, or new procedure performed?: [x] No    [] Yes (see summary sheet for update) Subjective functional status/changes:   [] No changes reported Patient reports the lateral side of her L knee has been aching all weekend with Saturday being the worst day. Walked the neighborhood for around a half mile yesterday which was very difficult and uncomfortable. OBJECTIVE Modality rationale: decrease inflammation and decrease pain to improve the patients ability to perform ADLs and reduce pain levels Type Additional Details  
[] Estim: []Att   []Unatt    []TENS instruct []IFC  []Premod   []NMES []Other:  []w/US   []w/ice   []w/heat Position: Location:  
[]  Traction: [] Cervical       []Lumbar 
                     [] Prone          []Supine []Intermittent   []Continuous Lbs: 
[] before manual 
[] after manual 
[]w/heat  
[]  Ultrasound: []Continuous   [] Pulsed  
                    at: []1MHz   []3MHz Location: 
W/cm2:  
[] Paraffin Location:  
[]w/heat  
[x]  Ice(10)    []  Heat 
[]  Ice massage Position: supine Location:L knee  
[]  Laser 
[]  Other: Position: Location:  
[]  Vasopneumatic Device Pressure:       [] lo [] med [] hi  
Temperature:   
 
[x] Skin assessment post-treatment:  [x]intact []redness- no adverse reaction 
  []redness  adverse reaction:  
 
53 min Therapeutic Exercise:  [x] See flow sheet :  
Rationale: increase ROM and increase strength to improve the patients ability to perform ADLs and reduce pain levels With 
 [] TE 
 [] TA 
 [] neuro 
 [] other: Patient Education: [x] Review HEP [] Progressed/Changed HEP based on:  
[] positioning   [] body mechanics   [] transfers   [] heat/ice application   
[] other:   
  
Other Objective/Functional Measures: none noted Pain Level (0-10 scale) post treatment: 2/10 ASSESSMENT/Changes in Function:  
Patient will experience slight discomfort while performing SAQs. Will continue to progress as tolerated. Patient will continue to benefit from skilled PT services to modify and progress therapeutic interventions, address functional mobility deficits, address ROM deficits, address strength deficits, analyze and address soft tissue restrictions, analyze and cue movement patterns and analyze and modify body mechanics/ergonomics to attain remaining goals. [x]  See Plan of Care 
[]  See progress note/recertification 
[]  See Discharge Summary Progress towards goals / Updated goals: 
Patient is progressing towards goals, will continue to strengthen the hip stabilizers in order to reduce pain levels. PLAN [x]  Upgrade activities as tolerated     [x]  Continue plan of care [x]  Update interventions per flow sheet      
[]  Discharge due to:_ 
[]  Other:_ [facility] 2/25/2019

## 2019-02-28 ENCOUNTER — HOSPITAL ENCOUNTER (OUTPATIENT)
Dept: PHYSICAL THERAPY | Age: 54
Discharge: HOME | End: 2019-02-28
Payer: COMMERCIAL

## 2019-02-28 PROCEDURE — 97110 THERAPEUTIC EXERCISES: CPT | Performed by: PHYSICAL THERAPIST

## 2019-02-28 PROCEDURE — 97112 NEUROMUSCULAR REEDUCATION: CPT | Performed by: PHYSICAL THERAPIST

## 2019-02-28 NOTE — PROGRESS NOTES
PT DAILY TREATMENT NOTE 2-15 Patient Name: Estella Julien Date:2019 : 1965 [x]  Patient  Verified Payor: Caroline Barker / Plan: Christina Bolaños / Product Type: PPO / In time: 7:02am  Out time: 7:52am 
Total Treatment Time (min): 50 Visit #: 3 Treatment Area: Left knee pain [M25.562] SUBJECTIVE Pain Level (0-10 scale): 1 Any medication changes, allergies to medications, adverse drug reactions, diagnosis change, or new procedure performed?: [x] No    [] Yes (see summary sheet for update) Subjective functional status/changes:   [] No changes reported The patient reports that she's doing better overall. OBJECTIVE 40 min Therapeutic Exercise:  [x] See flow sheet :  
Rationale: increase ROM, increase strength and improve coordination to improve the patients ability to perform daily activities. 10 min Neuromuscular Re-education:  [x]  See flow sheet :  
Rationale: improve coordination, improve balance and increase proprioception  to improve the patients ability to perform daily activities. With 
 [x] TE 
 [] TA 
 [] neuro 
 [] other: Patient Education: [x] Review HEP [x] Progressed/Changed HEP based on:  
[x] positioning   [x] body mechanics   [] transfers   [] heat/ice application   
[] other:   
 
Other Objective/Functional Measures: Pt. Tolerated therex well Pain Level (0-10 scale) post treatment: 0 
 
ASSESSMENT/Changes in Function: The patient progressed with stability and strengthening therex as tolerated.   
Patient will continue to benefit from skilled PT services to modify and progress therapeutic interventions, address functional mobility deficits, address ROM deficits, address strength deficits, analyze and address soft tissue restrictions, analyze and cue movement patterns, analyze and modify body mechanics/ergonomics, assess and modify postural abnormalities, address imbalance/dizziness and instruct in home and community integration to attain remaining goals. [x]  See Plan of Care 
[]  See progress note/recertification 
[]  See Discharge Summary Progress towards goals / Updated goals: The patient is progressing towards goals. PLAN [x]  Upgrade activities as tolerated     [x]  Continue plan of care [x]  Update interventions per flow sheet      
[]  Discharge due to:_ 
[]  Other:_   
 
Mel Castro, PT 2/28/2019

## 2019-03-04 ENCOUNTER — HOSPITAL ENCOUNTER (OUTPATIENT)
Dept: PHYSICAL THERAPY | Age: 54
Discharge: HOME | End: 2019-03-04
Payer: COMMERCIAL

## 2019-03-04 PROCEDURE — 97112 NEUROMUSCULAR REEDUCATION: CPT | Performed by: PHYSICAL THERAPIST

## 2019-03-04 PROCEDURE — 97110 THERAPEUTIC EXERCISES: CPT | Performed by: PHYSICAL THERAPIST

## 2019-03-04 NOTE — PROGRESS NOTES
PT DAILY TREATMENT NOTE 2-15 Patient Name: Vicky Leach Date:3/4/2019 : 1965 [x]  Patient  Verified Payor: Astrid Craig / Plan: [employer] / Product Type: PPO / In time: 5:30pm  Out time: 6:23pm 
Total Treatment Time (min): 53 Visit #: 8 Treatment Area: Left knee pain [M25.562] SUBJECTIVE Pain Level (0-10 scale): 0 Any medication changes, allergies to medications, adverse drug reactions, diagnosis change, or new procedure performed?: [x] No    [] Yes (see summary sheet for update) Subjective functional status/changes:   [] No changes reported The patient reports that she felt great after last time, but had a slight flare up over the weekend (she did walk a trail for about 40 minutes, but thinks it was the mattress in [place]). OBJECTIVE 40 min Therapeutic Exercise:  [x] See flow sheet :  
Rationale: increase ROM, increase strength and improve coordination to improve the patients ability to perform daily activities. 13 min Neuromuscular Re-education:  [x]  See flow sheet :  
Rationale: improve coordination, improve balance and increase proprioception  to improve the patients ability to perform daily activities. With 
 [x] TE 
 [] TA 
 [] neuro 
 [] other: Patient Education: [x] Review HEP [x] Progressed/Changed HEP based on:  
[x] positioning   [x] body mechanics   [] transfers   [] heat/ice application   
[] other:   
 
Other Objective/Functional Measures: Pt. Had improved strength and stability with therex today Pain Level (0-10 scale) post treatment: 0 
 
ASSESSMENT/Changes in Function: The patient progressed with dynamic stability as tolerated.  
Patient will continue to benefit from skilled PT services to modify and progress therapeutic interventions, address functional mobility deficits, address ROM deficits, address strength deficits, analyze and address soft tissue restrictions, analyze and cue movement patterns, analyze and modify body mechanics/ergonomics, assess and modify postural abnormalities, address imbalance/dizziness and instruct in home and community integration to attain remaining goals. [x]  See Plan of Care 
[]  See progress note/recertification 
[]  See Discharge Summary Progress towards goals / Updated goals: The patient is progressing towards goals. PLAN [x]  Upgrade activities as tolerated     [x]  Continue plan of care [x]  Update interventions per flow sheet      
[]  Discharge due to:_ 
[]  Other:_   
 
Carmen Walsh, PT 3/4/2019

## 2019-03-05 RX ORDER — FENOFIBRATE 145 MG/1
TABLET, COATED ORAL
Qty: 90 TAB | Refills: 1 | Status: SHIPPED | OUTPATIENT
Start: 2019-03-05 | End: 2019-08-26 | Stop reason: SDUPTHER

## 2019-03-06 ENCOUNTER — HOSPITAL ENCOUNTER (OUTPATIENT)
Dept: PHYSICAL THERAPY | Age: 54
Discharge: HOME | End: 2019-03-06
Payer: COMMERCIAL

## 2019-03-06 PROCEDURE — 97110 THERAPEUTIC EXERCISES: CPT | Performed by: PHYSICAL THERAPIST

## 2019-03-06 PROCEDURE — 97112 NEUROMUSCULAR REEDUCATION: CPT | Performed by: PHYSICAL THERAPIST

## 2019-03-06 NOTE — PROGRESS NOTES
PT DAILY TREATMENT NOTE 2-15    Patient Name: Jade Prince  Date:3/6/2019  : 1965  [x]  Patient  Verified  Payor: Maikel Prado / Plan: Ken Mccarty / Product Type: PPO /    In time: 5:33pm  Out time:6:20pm  Total Treatment Time (min): 47  Visit #: 9    Treatment Area: Left knee pain [M25.562]    SUBJECTIVE  Pain Level (0-10 scale):  0  Any medication changes, allergies to medications, adverse drug reactions, diagnosis change, or new procedure performed?: [x] No    [] Yes (see summary sheet for update)  Subjective functional status/changes:   [] No changes reported  The patient reports that she felt fine after last visit. OBJECTIVE    38 min Therapeutic Exercise:  [x] See flow sheet :   Rationale: increase ROM, increase strength and improve coordination to improve the patients ability to perform daily activities. 9 min Neuromuscular Re-education:  [x]  See flow sheet :   Rationale: improve coordination, improve balance and increase proprioception  to improve the patients ability to perform daily activities. With   [x] TE   [] TA   [] neuro   [] other: Patient Education: [x] Review HEP    [x] Progressed/Changed HEP based on:   [x] positioning   [x] body mechanics   [] transfers   [] heat/ice application    [] other:      Other Objective/Functional Measures: Pt. Tolerated therex     Pain Level (0-10 scale) post treatment: 0    ASSESSMENT/Changes in Function:   The patient had slight left knee valgus deviation during jumping activities today, but progressed with impact well overall.    Patient will continue to benefit from skilled PT services to modify and progress therapeutic interventions, address functional mobility deficits, address ROM deficits, address strength deficits, analyze and address soft tissue restrictions, analyze and cue movement patterns, analyze and modify body mechanics/ergonomics, assess and modify postural abnormalities, address imbalance/dizziness and instruct in home and community integration to attain remaining goals. [x]  See Plan of Care  []  See progress note/recertification  []  See Discharge Summary         Progress towards goals / Updated goals: The patient is progressing towards goals.      PLAN  [x]  Upgrade activities as tolerated     [x]  Continue plan of care  [x]  Update interventions per flow sheet       []  Discharge due to:_  []  Other:_      Mickie Randle, PT 3/6/2019

## 2019-03-11 ENCOUNTER — APPOINTMENT (OUTPATIENT)
Dept: PHYSICAL THERAPY | Age: 54
End: 2019-03-11
Payer: COMMERCIAL

## 2019-03-13 ENCOUNTER — HOSPITAL ENCOUNTER (OUTPATIENT)
Dept: PHYSICAL THERAPY | Age: 54
Discharge: HOME | End: 2019-03-13
Payer: COMMERCIAL

## 2019-03-13 PROCEDURE — 97110 THERAPEUTIC EXERCISES: CPT

## 2019-03-13 NOTE — PROGRESS NOTES
PT DAILY TREATMENT NOTE 2-15    Patient Name: Eugenia Hartman  Date:3/13/2019  : 1965  [x]  Patient  Verified  Payor: Shiva Garza / Plan: Blayne Poster / Product Type: PPO /    In time:700  Out time:758  Total Treatment Time (min): 62  Visit #:  10    Treatment Area: Left knee pain [M25.562]    SUBJECTIVE  Pain Level (0-10 scale): 0/10  Any medication changes, allergies to medications, adverse drug reactions, diagnosis change, or new procedure performed?: [x] No    [] Yes (see summary sheet for update)  Subjective functional status/changes:   [] No changes reported  Patient went for a walk along [facility] for two hours and experienced no pain or discomfort. OBJECTIVE    58 min Therapeutic Exercise:  [x] See flow sheet :   Rationale: increase ROM and increase strength to improve the patients ability to perform ADLs and reduce pain levels    With   [] TE   [] TA   [] neuro   [] other: Patient Education: [x] Review HEP    [] Progressed/Changed HEP based on:   [] positioning   [] body mechanics   [] transfers   [] heat/ice application    [] other:      Other Objective/Functional Measures: none noted     Pain Level (0-10 scale) post treatment: 0/10    ASSESSMENT/Changes in Function:   Patient experienced slight discomfort when performing quick taps on the stairs. Improved all static and dynamic balancing on both even and uneven surfaces. Will continue to progress as tolerated. Patient will continue to benefit from skilled PT services to modify and progress therapeutic interventions, address functional mobility deficits, address ROM deficits, address strength deficits, analyze and address soft tissue restrictions, analyze and cue movement patterns and analyze and modify body mechanics/ergonomics to attain remaining goals.      [x]  See Plan of Care  []  See progress note/recertification  []  See Discharge Summary         Progress towards goals / Updated goals:  Patient is progressing towards goals, will continue to strengthen the hip stabilizers in order to reduce pain levels    PLAN  [x]  Upgrade activities as tolerated     [x]  Continue plan of care  [x]  Update interventions per flow sheet       []  Discharge due to:_  []  Other:_      Sj Mondragon 3/13/2019

## 2019-03-19 ENCOUNTER — HOSPITAL ENCOUNTER (OUTPATIENT)
Dept: PHYSICAL THERAPY | Age: 54
Discharge: HOME | End: 2019-03-19
Payer: COMMERCIAL

## 2019-03-19 PROCEDURE — 97112 NEUROMUSCULAR REEDUCATION: CPT | Performed by: PHYSICAL THERAPIST

## 2019-03-19 PROCEDURE — 97110 THERAPEUTIC EXERCISES: CPT | Performed by: PHYSICAL THERAPIST

## 2019-03-19 NOTE — ANCILLARY DISCHARGE INSTRUCTIONS
[facility]  [address] ([address] [facility] [place]  Phone: [phone] Fax: [phone]    Discharge Summary  2-15    Patient name: Hyacinth Sky  : 1965  Provider#: [id number]  Referral source: Kelli Harrington MD      Medical/Treatment Diagnosis: Left knee pain [M25.562]     Prior Hospitalization: see medical history     Comorbidities: See Plan of Care  Prior Level of Function:See Plan of Care  Medications: Verified on Patient Summary List    Start of Care: 19      Onset Date: 2018   Visits from Start of Care: 11     Missed Visits: 1  Reporting Period : 19 to 3/19/19      ASSESSMENT/SUMMARY OF CARE: The patient has progressed very well with improved strengthening and stability overall with her left knee pain that seemed meniscal in presentation due to initial joint line tenderness and decreased ROM. She was able to hike 6 miles in two days without increase in pain or flare up in symptoms. She lightly jogged on the treadmill in the clinic for less than 5 minutes with only minimal discomfort, but reports that she always has issues with treadmill running. She has a dynamic strengthening and stability home exercise program that she will continue independently to prepare for participation in the [facility]. 10k next month. Short Term Goals: To be accomplished in 2 treatments:                         6.) The patient will be independent with their HEP consistently for at least one week. - MET  Long Term Goals: To be accomplished in 16 treatments:                         1.) The patient will have at most 1/10 pain with daily activities. - MET                         1.) The patient will be able to run for at least 5 min without significant pain or increased swelling.- Progressing/Not MET                          9.) The patient will improve their FOTO score from 58 to at least 64 to show improvements in functional mobility.- MET (80 today)    RECOMMENDATIONS:  [x]Discontinue therapy: [x]Patient has reached or is progressing toward set goals      []Patient is non-compliant or has abdicated      []Due to lack of appreciable progress towards set goals      []Other    Ramila Márquez, PT 3/19/2019

## 2019-03-19 NOTE — PROGRESS NOTES
PT DAILY TREATMENT NOTE 2-15    Patient Name: Efren Gamez  Date:3/19/2019  : 1965  [x]  Patient  Verified  Payor: Edna Witt / Plan: Teri Bowen / Product Type: PPO /    In time:7:04am  Out time: 7:43am  Total Treatment Time (min): 39  Visit #: 11    Treatment Area: Left knee pain [M25.562]    SUBJECTIVE  Pain Level (0-10 scale): 0  Any medication changes, allergies to medications, adverse drug reactions, diagnosis change, or new procedure performed?: [x] No    [] Yes (see summary sheet for update)  Subjective functional status/changes:   [] No changes reported  The patient reports that she hiked over the weekend and felt great, 3 miles the first day and 3 miles the second day. OBJECTIVE    25 min Therapeutic Exercise:  [x] See flow sheet :   Rationale: increase ROM, increase strength and improve coordination to improve the patients ability to perform daily activities. 14 min Neuromuscular Re-education:  [x]  See flow sheet :   Rationale: improve coordination, improve balance and increase proprioception  to improve the patients ability to perform daily activities. With   [x] TE   [] TA   [] neuro   [] other: Patient Education: [x] Review HEP    [x] Progressed/Changed HEP based on:   [x] positioning   [x] body mechanics   [] transfers   [] heat/ice application    [] other:      Other Objective/Functional Measures: FOTO=80 (58 at eval)     Pain Level (0-10 scale) post treatment: 0    ASSESSMENT/Changes in Function:   The patient has progressed very well with improved strength, stability, and decreased pain with activity overall and will be discharged today. []  See Plan of Care  []  See progress note/recertification  [x]  See Discharge Summary         Progress towards goals / Updated goals: The patient has MET or is progressing towards goals.     PLAN  []  Upgrade activities as tolerated     []  Continue plan of care  []  Update interventions per flow sheet [x]  Discharge due to: Pt. Has MET goals.    []  Other:_      Tank Baugh, PT 3/19/2019

## 2019-04-25 ENCOUNTER — OFFICE VISIT (OUTPATIENT)
Dept: INTERNAL MEDICINE CLINIC | Age: 54
End: 2019-04-25

## 2019-04-25 VITALS
BODY MASS INDEX: 24.47 KG/M2 | WEIGHT: 165.2 LBS | HEART RATE: 72 BPM | HEIGHT: 69 IN | OXYGEN SATURATION: 98 % | TEMPERATURE: 98.5 F | RESPIRATION RATE: 19 BRPM | SYSTOLIC BLOOD PRESSURE: 110 MMHG | DIASTOLIC BLOOD PRESSURE: 70 MMHG

## 2019-04-25 DIAGNOSIS — R35.0 FREQUENCY OF URINATION: Primary | ICD-10-CM

## 2019-04-25 DIAGNOSIS — F40.243 FEAR OF FLYING: ICD-10-CM

## 2019-04-25 DIAGNOSIS — R82.81 PYURIA: ICD-10-CM

## 2019-04-25 LAB
BILIRUB UR QL STRIP: NEGATIVE
GLUCOSE UR-MCNC: NEGATIVE MG/DL
KETONES P FAST UR STRIP-MCNC: NEGATIVE MG/DL
PH UR STRIP: 6.5 [PH] (ref 4.6–8)
PROT UR QL STRIP: NEGATIVE
SP GR UR STRIP: 1.01 (ref 1–1.03)
UA UROBILINOGEN AMB POC: NORMAL (ref 0.2–1)
URINALYSIS CLARITY POC: NORMAL
URINALYSIS COLOR POC: NORMAL
URINE BLOOD POC: NEGATIVE
URINE LEUKOCYTES POC: NORMAL
URINE NITRITES POC: NEGATIVE

## 2019-04-25 RX ORDER — NITROFURANTOIN 25; 75 MG/1; MG/1
100 CAPSULE ORAL 2 TIMES DAILY
Qty: 7 CAP | Refills: 0 | Status: SHIPPED | OUTPATIENT
Start: 2019-04-25 | End: 2019-10-03 | Stop reason: ALTCHOICE

## 2019-04-25 RX ORDER — NITROFURANTOIN 25; 75 MG/1; MG/1
100 CAPSULE ORAL 2 TIMES DAILY
Qty: 10 CAP | Refills: 0 | Status: SHIPPED | OUTPATIENT
Start: 2019-04-25 | End: 2019-04-25 | Stop reason: CLARIF

## 2019-04-25 RX ORDER — ALPRAZOLAM 0.5 MG/1
0.5 TABLET ORAL
Qty: 30 TAB | Refills: 0 | Status: SHIPPED | OUTPATIENT
Start: 2019-04-25

## 2019-04-25 NOTE — PROGRESS NOTES
Acute Care Note    Eva Tesfaye is 62 y.o. female. she presents for evaluation of Urinary Burning and Request For New Medication    Urinary Changes  Beryle Spar is a 62 y.o. female who complains of dysuria, frequency for 3 days. This is a new problem. She denies foul smelling urine, burning with urination. She reports symptoms are mild/moderate at this time. Patient has tried: drinking more water. Prior to Admission medications    Medication Sig Start Date End Date Taking? Authorizing Provider   fenofibrate nanocrystallized (TRICOR) 145 mg tablet TAKE 1 TABLET BY MOUTH EVERY DAY 3/5/19  Yes Joseph Hassan MD   diclofenac (VOLTAREN) 1 % gel Apply 4 grams to left knee three times daily 1/2/19  Yes Provider, Historical   liothyronine (CYTOMEL) 5 mcg tablet Take 1 Tab by mouth daily. 2/12/19  Yes Joseph Hassan MD   LEVOXYL 88 mcg tablet TAKE 1 TABLET BY MOUTH EVERY DAY BEFORE BREAKFAST 11/26/18  Yes Ulysses Hausen, MD   metFORMIN (GLUCOPHAGE) 500 mg tablet Take 0.5 Tabs by mouth two (2) times daily (with meals). 11/26/18  Yes Ulysses Hausen, MD   Cetirizine (ZYRTEC) 10 mg cap Zyrtec   Yes Provider, Historical   ESTRACE 0.01 % (0.1 mg/gram) vaginal cream APPLY 1GM TO VAGINAL AREA EVERY DAY FOR 2 WKS AND THEN 2-3 X WEEKLY THEREAFTER 1/6/18  Yes Provider, Historical   cholecalciferol, vitamin D3, 2,000 unit tab Take 2,000 Units by mouth daily. Yes Provider, Historical   VITAMIN E, DL,TOCOPHERYL ACET, (VITAMIN E ACETATE) 400 unit cap capsule Take  by mouth daily. Yes Provider, Historical   zinc 50 mg tab tablet Take  by mouth daily. Provider, Historical   Biotin 2,500 mcg cap Take 5,000 mcg by mouth daily. Other, MD Moraima   magnesium oxide (MAG-OX) 400 mg tablet Take 400 mg by mouth daily.     Provider, Historical         Patient Active Problem List   Diagnosis Code    Hypothyroidism E03.9    Allergic rhinitis J30.9    Microscopic hematuria R31.29    Dyslipidemia E78.5    Dysuria R30.0    Prediabetes R73.03    Thyroid nodule E04.1    Hypertriglyceridemia E78.1    Neuropathic pain of both legs G57.93         Review of Systems   Constitutional: Negative. Respiratory: Negative. Cardiovascular: Negative. Genitourinary: Positive for dysuria, frequency and urgency. Visit Vitals  /70 (BP 1 Location: Right arm, BP Patient Position: Sitting)   Pulse 72   Temp 98.5 °F (36.9 °C) (Oral)   Resp 19   Ht 5' 9\" (1.753 m)   Wt 165 lb 3.2 oz (74.9 kg)   SpO2 98%   BMI 24.40 kg/m²       Physical Exam   Constitutional: She appears well-developed and well-nourished. Cardiovascular: Normal rate and regular rhythm. Pulmonary/Chest: Effort normal and breath sounds normal.   Abdominal: There is tenderness (mildly at the suprapubic area). ASSESSMENT/PLAN  Diagnoses and all orders for this visit:    1. Frequency of urination  -     AMB POC URINALYSIS DIP STICK AUTO W/O MICRO  -     UA/M W/RFLX CULTURE, ROUTINE       Follow-up and Dispositions    · Return if symptoms worsen or fail to improve. Advised the patient to call back or return to office if symptoms worsen/change/persist.   Discussed expected course/resolution/complications of diagnosis in detail with patient. Medication risks/benefits/costs/interactions/alternatives discussed with patient. The patient was given an after visit summary which includes diagnoses, current medications, & vitals. They expressed understanding with the diagnosis and plan.

## 2019-04-28 LAB
APPEARANCE UR: CLEAR
BACTERIA #/AREA URNS HPF: ABNORMAL /[HPF]
BACTERIA UR CULT: ABNORMAL
BILIRUB UR QL STRIP: NEGATIVE
CASTS URNS QL MICRO: ABNORMAL /LPF
COLOR UR: YELLOW
EPI CELLS #/AREA URNS HPF: >10 /HPF (ref 0–10)
GLUCOSE UR QL: NEGATIVE
HGB UR QL STRIP: NEGATIVE
KETONES UR QL STRIP: NEGATIVE
LEUKOCYTE ESTERASE UR QL STRIP: ABNORMAL
MICRO URNS: ABNORMAL
MUCOUS THREADS URNS QL MICRO: PRESENT
NITRITE UR QL STRIP: NEGATIVE
PH UR STRIP: 6.5 [PH] (ref 5–7.5)
PROT UR QL STRIP: NEGATIVE
RBC #/AREA URNS HPF: ABNORMAL /HPF (ref 0–2)
SP GR UR: 1.01 (ref 1–1.03)
URINALYSIS REFLEX, 377202: ABNORMAL
UROBILINOGEN UR STRIP-MCNC: 0.2 MG/DL (ref 0.2–1)
WBC #/AREA URNS HPF: ABNORMAL /HPF (ref 0–5)

## 2019-05-10 LAB
T3FREE SERPL-MCNC: 3.9 PG/ML (ref 2–4.4)
T4 FREE SERPL-MCNC: 1.47 NG/DL (ref 0.82–1.77)
TSH SERPL DL<=0.005 MIU/L-ACNC: 0.23 UIU/ML (ref 0.45–4.5)

## 2019-06-27 ENCOUNTER — HOSPITAL ENCOUNTER (OUTPATIENT)
Dept: MAMMOGRAPHY | Age: 54
Discharge: HOME | End: 2019-06-27
Attending: OBSTETRICS & GYNECOLOGY
Payer: COMMERCIAL

## 2019-06-27 DIAGNOSIS — Z12.39 BREAST SCREENING, UNSPECIFIED: ICD-10-CM

## 2019-06-27 PROCEDURE — 77067 SCR MAMMO BI INCL CAD: CPT

## 2019-06-27 PROCEDURE — 77063 BREAST TOMOSYNTHESIS BI: CPT

## 2019-08-01 RX ORDER — LIOTHYRONINE SODIUM 5 UG/1
TABLET ORAL
Qty: 90 TAB | Refills: 0 | Status: SHIPPED | OUTPATIENT
Start: 2019-08-01 | End: 2019-10-28 | Stop reason: SDUPTHER

## 2019-08-12 ENCOUNTER — TELEPHONE (OUTPATIENT)
Dept: INTERNAL MEDICINE CLINIC | Age: 54
End: 2019-08-12

## 2019-08-12 NOTE — TELEPHONE ENCOUNTER
Patient states 1st shingles vaccine on Friday, arm sore, intense headaches, feel like electrical shock. Took 3 Advil and stopped. Headache returned Sunday. Advised if sx continue will need to schedule for evaluation. If worsen overnight go to urgent care. Patient stated will wait for 2nd dose of vaccine. Pt verbalized understanding.

## 2019-08-26 RX ORDER — FENOFIBRATE 145 MG/1
TABLET, COATED ORAL
Qty: 90 TAB | Refills: 1 | Status: SHIPPED | OUTPATIENT
Start: 2019-08-26 | End: 2020-02-17

## 2019-09-23 ENCOUNTER — OFFICE VISIT (OUTPATIENT)
Dept: ENDOCRINOLOGY | Age: 54
End: 2019-09-23

## 2019-09-23 VITALS
WEIGHT: 165 LBS | HEIGHT: 69 IN | SYSTOLIC BLOOD PRESSURE: 92 MMHG | HEART RATE: 73 BPM | BODY MASS INDEX: 24.44 KG/M2 | DIASTOLIC BLOOD PRESSURE: 56 MMHG

## 2019-09-23 DIAGNOSIS — E03.9 ACQUIRED HYPOTHYROIDISM: Primary | ICD-10-CM

## 2019-09-23 DIAGNOSIS — E78.1 HYPERTRIGLYCERIDEMIA: ICD-10-CM

## 2019-09-23 DIAGNOSIS — E04.1 THYROID NODULE: ICD-10-CM

## 2019-09-23 DIAGNOSIS — R73.03 PREDIABETES: ICD-10-CM

## 2019-09-23 RX ORDER — NYSTATIN AND TRIAMCINOLONE ACETONIDE 100000; 1 [USP'U]/G; MG/G
OINTMENT TOPICAL
COMMUNITY
End: 2019-11-12

## 2019-09-23 NOTE — PATIENT INSTRUCTIONS
Hypothyroidism:   Levoxyl 88 mcg daily  Cytomel 5 mcg daily (started prev by Dr. Lewayne Phalen)  Taking correctly, pre-labs before next visit,    Prediabetes:   Metformin 250mg twice daily ok for now, diet/exercise,    Thyroid nodule:   1/20/17: Thyroid Ultrasound 0.5 cm x 7 mm x 1.2 cm left superior nodule  Repeat thyroid US ordered,    Lipids: On fenofibrate, , trig elevated, advised to consider a statin, she will think about it. Plan to return in 6 months,       [facility].  [address] [facility]  [address] [place] [address]

## 2019-09-23 NOTE — PROGRESS NOTES
CHIEF COMPLAINT: f/u evaluation for hypothyroidism    HISTORY OF PRESENT ILLNESS:   Darcie Mosley is a 62 y.o. female with a PMHx as noted below who presents to the endocrinology clinic for f/u evaluation of hypothyroidism. Patient is present to establish care. She was previously cared for by Dr. Db Dalal who is no longer present. Hypothyroidism:   Levoxyl 88 mcg daily  Cytomel 5 mcg daily (started prev by Dr. Db Dalal)  Patient taking as directed, feeling well,  Recent TSH looks stable,  Denies symptoms of hyper or hypothyroidism. Prediabetes:   Metformin 250mg BID,   A1c 5.8%, stable,   Reports hx of not tolerating higher doses,    Thyroid nodule:   1/20/17: Thyroid Ultrasound 0.5 cm x 7 mm x 1.2 cm left superior nodule  Surveillance US selected by patient per Dr. Db Dalal,  No dysphagia    Lipids:  On fenofibrate, , trig elevated    Review of most recent thyroid function:  Lab Results   Component Value Date    TSH 0.628 09/19/2019    TSH 0.230 (L) 05/09/2019    TSH 2.510 02/06/2019    FT4 1.28 09/19/2019    FT4 1.47 05/09/2019    FT4 1.57 02/06/2019    FRT3 3.9 05/09/2019      Thyroid Lab Key:  TSILT = Thyroid stimulating antibodies  TRALT = TSH Receptor Antibodies  TMCLT = TPO antibodies  T3LT = Total T3 levels  131639 = Direct FT4  133090 = Free T3    PAST MEDICAL/SURGICAL HISTORY:   Past Medical History:   Diagnosis Date    Adverse effect of anesthesia     anxiety with waking up    Asthma     hx of asthma r/t allergies/emotional response    Diabetes ([facility] [address]     pre diabetic/ no meds    Hx of mammogram 10/2/12    Ill-defined condition     low blood pressure    Nausea & vomiting     Nephrolithiasis     right kidney stone    Other ill-defined conditions(799.89)     echocardiogram,12/2010,  has murmur    Thyroid disease     enlarged & inflamed thryoid/ hypo      Past Surgical History:   Procedure Laterality Date    ABDOMEN SURGERY PROC UNLISTED      laparoscopy to check for endometriosis    COLONOSCOPY N/A 2/26/2018    COLONOSCOPY performed by Socorro Tran MD at [address] [address]  2/26/2018         HX BUNIONECTOMY  2001    ,right bunion    HX HERNIA REPAIR      umbilical    HX HYSTERECTOMY  2007    laparoscopic hysterecomy, rectocele repair, umbilical hernia repair, sling    HX ORTHOPAEDIC Right 2001    foot-bunion with hardware    HX RECTOCELE REPAIR  2007    HX TONSILLECTOMY  age 8    HX UROLOGICAL  2007    urethral sling       ALLERGIES:   Allergies   Allergen Reactions    Pcn [Penicillins] Nausea Only     Orally only,can take intravenously       MEDICATIONS ON ADMISSION:     Current Outpatient Medications:     TURMERIC PO, Take  by mouth., Disp: , Rfl:     fenofibrate nanocrystallized (TRICOR) 145 mg tablet, TAKE 1 TABLET BY MOUTH EVERY DAY, Disp: 90 Tab, Rfl: 1    liothyronine (CYTOMEL) 5 mcg tablet, TAKE 1 TABLET BY MOUTH EVERY DAY, Disp: 90 Tab, Rfl: 0    LEVOXYL 88 mcg tablet, TAKE 1 TABLET BY MOUTH EVERY DAY BEFORE BREAKFAST (Patient taking differently: TAKE 1 TABLET BY MOUTH DAILY EXCEPT 1/2 TAB EVERY OTHER SUNDAY), Disp: 90 Tab, Rfl: 3    metFORMIN (GLUCOPHAGE) 500 mg tablet, Take 0.5 Tabs by mouth two (2) times daily (with meals). , Disp: 90 Tab, Rfl: 3    Cetirizine (ZYRTEC) 10 mg cap, Zyrtec, Disp: , Rfl:     ESTRACE 0.01 % (0.1 mg/gram) vaginal cream, APPLY 1GM TO VAGINAL AREA EVERY DAY FOR 2 WKS AND THEN 2-3 X WEEKLY THEREAFTER, Disp: , Rfl: 4    cholecalciferol, vitamin D3, 2,000 unit tab, Take 2,000 Units by mouth daily. , Disp: , Rfl:     magnesium oxide (MAG-OX) 400 mg tablet, Take 400 mg by mouth daily. , Disp: , Rfl:     VITAMIN E, DL,TOCOPHERYL ACET, (VITAMIN E ACETATE) 400 unit cap capsule, Take  by mouth daily. , Disp: , Rfl:     nystatin-triamcinolone (MYCOLOG) 100,000-0.1 unit/gram-% ointment, nystatin-triamcinolone 100,000 unit/gram-0.1 % topical ointment  APPLY TO THE AFFECTED AREA(S) BY TOPICAL ROUTE 2-3 TIMES PER DAY, Disp: , Rfl:    ALPRAZolam (XANAX) 0.5 mg tablet, Take 1 Tab by mouth three (3) times daily as needed for Anxiety. Max Daily Amount: 1.5 mg., Disp: 30 Tab, Rfl: 0    nitrofurantoin, macrocrystal-monohydrate, (MACROBID) 100 mg capsule, Take 1 Cap by mouth two (2) times a day. Indications: urinary tract infection caused by Staphylococcus aureus, Disp: 7 Cap, Rfl: 0    diclofenac (VOLTAREN) 1 % gel, Apply 4 grams to left knee three times daily, Disp: , Rfl:     zinc 50 mg tab tablet, Take  by mouth daily. , Disp: , Rfl:     SOCIAL HISTORY:   Social History     Socioeconomic History    Marital status: SINGLE     Spouse name:     Number of children: 3    Years of education: Not on file    Highest education level: Not on file   Occupational History    Occupation: MammographerPete     Employer: [employer]   Social Needs    Financial resource strain: Not on file    Food insecurity:     Worry: Not on file     Inability: Not on file    Transportation needs:     Medical: Not on file     Non-medical: Not on file   Tobacco Use    Smoking status: Former Smoker     Years: 10.00     Types: Cigarettes     Last attempt to quit: 1989     Years since quittin.2    Smokeless tobacco: Never Used    Tobacco comment: used to smoke 2-3 cigarettes a day   Substance and Sexual Activity    Alcohol use: No    Drug use: No     Types: OTC    Sexual activity: Not Currently   Lifestyle    Physical activity:     Days per week: Not on file     Minutes per session: Not on file    Stress: Not on file   Relationships    Social connections:     Talks on phone: Not on file     Gets together: Not on file     Attends Jain service: Not on file     Active member of club or organization: Not on file     Attends meetings of clubs or organizations: Not on file     Relationship status: Not on file    Intimate partner violence:     Fear of current or ex partner: Not on file     Emotionally abused: Not on file     Physically abused: Not on file     Forced sexual activity: Not on file   Other Topics Concern     Service Not Asked    Blood Transfusions Not Asked    Caffeine Concern Not Asked    Occupational Exposure Not Asked    Hobby Hazards Not Asked    Sleep Concern Not Asked    Stress Concern Not Asked    Weight Concern Not Asked    Special Diet Not Asked    Back Care Not Asked    Exercise No     Comment: runs regularly    Bike Helmet Not Asked   [address] Not Asked    Self-Exams Not Asked   Social History Narrative    One son, two daughters       FAMILY HISTORY:  Family History   Problem Relation Age of Onset    Thyroid Disease Father     Cancer Father         CML    Colon Polyps Father     Thyroid Disease Brother     Thyroid Cancer Brother     Heart Disease Maternal Grandfather     Heart Disease Paternal Grandfather        REVIEW OF SYSTEMS: Complete ROS assessed and noted for that which is described above, all else are negative. Eyes: normal  ENT: normal  CVS: normal  Resp: normal  GI: normal  : normal  GYN: normal  Endocrine: normal  Integument: normal  Musculoskeletal: normal  Neuro: normal  Psych: normal      PHYSICAL EXAMINATION:    VITAL SIGNS:  Visit Vitals  BP 92/56 (BP 1 Location: Left arm, BP Patient Position: Sitting)   Pulse 73   Ht 5' 9\" (1.753 m)   Wt 165 lb (74.8 kg)   BMI 24.37 kg/m²       GENERAL: NCAT, Sitting comfortably, NAD  EYES: EOMI, non-icteric, no proptosis  Ear/Nose/Throat: NCAT, no inflammation, no masses  LYMPH NODES: No LAD  CARDIOVASCULAR: S1 S2, RRR, No murmur, 2+ radial pulses  RESPIRATORY: CTA b/l, no wheeze/rales  GASTROINTESTINAL:  ND  MUSCULOSKELETAL: Normal ROM, no atrophy  SKIN: warm, no edema/rash/ or other skin changes  NEUROLOGIC: 5/5 power all extremities, no tremors, AAOx3  PSYCHIATRIC: Normal affect, Normal insight and judgement         REVIEW OF LABORATORY AND RADIOLOGY DATA:   Labs and documentation have been reviewed as described above.      ASSESSMENT AND PLAN: Sylvia Solano is a 62 y.o. female with a PMHx as noted above who presents to the endocrinology clinic for the f/u evaluation of hypothyroidism. Hypothyroidism:   Levoxyl 88 mcg daily  Cytomel 5 mcg daily (started prev by Dr. Lewayne Phalen)  Taking correctly, pre-labs before next visit,    Prediabetes:   Metformin 250mg twice daily ok for now, diet/exercise,    Thyroid nodule:   1/20/17: Thyroid Ultrasound 0.5 cm x 7 mm x 1.2 cm left superior nodule  Repeat thyroid US ordered,    Lipids: On fenofibrate, , trig elevated, advised to consider a statin, she will think about it. RTC in 6 months with radha MAGANA  [address] [facility]

## 2019-10-02 ENCOUNTER — TELEPHONE (OUTPATIENT)
Dept: INTERNAL MEDICINE CLINIC | Age: 54
End: 2019-10-02

## 2019-10-03 ENCOUNTER — OFFICE VISIT (OUTPATIENT)
Dept: INTERNAL MEDICINE CLINIC | Age: 54
End: 2019-10-03

## 2019-10-03 VITALS
HEART RATE: 69 BPM | SYSTOLIC BLOOD PRESSURE: 100 MMHG | WEIGHT: 165 LBS | HEIGHT: 69 IN | TEMPERATURE: 97.9 F | DIASTOLIC BLOOD PRESSURE: 66 MMHG | RESPIRATION RATE: 17 BRPM | OXYGEN SATURATION: 99 % | BODY MASS INDEX: 24.44 KG/M2

## 2019-10-03 DIAGNOSIS — R19.5 MUCUS IN STOOL: Primary | ICD-10-CM

## 2019-10-03 DIAGNOSIS — R14.0 ABDOMINAL BLOATING: ICD-10-CM

## 2019-10-03 DIAGNOSIS — K57.90 DIVERTICULOSIS: ICD-10-CM

## 2019-10-03 NOTE — PROGRESS NOTES
Chief Complaint   Patient presents with    Stool Color Change     Stools become lighter x2 weeks ago. Recently noticed mucus around the stool. Abdominal bloating with flatulence. Hasn't noticed worms in her stool, but states that she ate beef jerky back in June, then placed it in her pantry and later noticed the jerky molded.

## 2019-10-03 NOTE — PROGRESS NOTES
63 yo female with 2 weeks of abd bloating, flatulence and mucus (no blood) with stools for 2 weeks along with stool urgency, but no abn pain; only nocturnal activity would be involuntary leakage of small amount stool with urination. She is concerned about parasites as she ate some beef jerky that was softer than she is accustomed to eating. That occurred in June. The product was purchased from [employer]. Other than the softness of the jerky, she didn't notice any other abnormality when she consumed it. However, when she took the package out of her pantry next, it was molded. She took it back to [facility]. She is concerned she may have a parasitic bacterial infection. Her first screening colonoscopy was Feb 2018. She pays attention to her overall health and diet. She eats blueberries, freya and flax seeds \"all the time\". PE: WNWD WF is alert and appears in NAD   T - 97.9   Repeat BP - 100/66   Heart - RRR   Lungs - clear   Abd - no M,T,O    Plan: Dietary alteration - clear liq diet for 24 hrs, the BRAT for 24 hrs, leaving fats to last to add back   Stool for O&P and GI Profile  _______________________________  Expected course of current diagnosed problem(s) as well as expected progression and possible complications, and desired follow up with provider are discussed with patient. Patient is encouraged to be back in touch with any questions or concerns. Patient expresses understanding of plan of care. Patient is given AVS which includes diagnoses, current medications, vitals.

## 2019-10-03 NOTE — PATIENT INSTRUCTIONS
CLEAR LIQUID DIET    Please follow a clear liquid diet if you are having nausea, vomiting and/or diarrhea. The foods listed below are acceptable for a clear liquid diet. Anything not specifically listed below is NOT allowed. Day 1      BEVERAGE: Clear tea (iced or hot); clear fruit juices or fruit-flavored powders and popsicles; carbonated beverage if tolerated; commercially prepared clear formula; any others tolerated by the patient and ordered by the physician. SOUP:  Fat free bouillon (chicken or beef), fat free broth. DESSERT:  Plain gelatin dessert, water, [place] Ice, popsicles. MISCELLANEOUS:  Salt, sugar, hard candy. Day 2  BRAT DIET:  B= Banana               R= Rice                         A= Applesauce                         T= Toast/crackers    AVOID Fat containing foods until no loose stools, vomiting and/or nausea for 24 hours.     Leave fats to last to add back in to diet

## 2019-10-09 ENCOUNTER — PATIENT MESSAGE (OUTPATIENT)
Dept: ENDOCRINOLOGY | Age: 54
End: 2019-10-09

## 2019-10-09 ENCOUNTER — HOSPITAL ENCOUNTER (OUTPATIENT)
Dept: ULTRASOUND IMAGING | Age: 54
Discharge: HOME | End: 2019-10-09
Attending: INTERNAL MEDICINE
Payer: COMMERCIAL

## 2019-10-09 DIAGNOSIS — E04.1 THYROID NODULE: ICD-10-CM

## 2019-10-09 PROCEDURE — 76536 US EXAM OF HEAD AND NECK: CPT

## 2019-10-09 NOTE — PROGRESS NOTES
Stable thyroid ultrasound, will notify patient [facility].  [address] [facility]  [address] [place] [address]

## 2019-10-28 RX ORDER — LIOTHYRONINE SODIUM 5 UG/1
TABLET ORAL
Qty: 90 TAB | Refills: 2 | Status: SHIPPED | OUTPATIENT
Start: 2019-10-28 | End: 2020-04-20 | Stop reason: SDUPTHER

## 2019-11-12 ENCOUNTER — OFFICE VISIT (OUTPATIENT)
Dept: INTERNAL MEDICINE CLINIC | Age: 54
End: 2019-11-12

## 2019-11-12 VITALS
TEMPERATURE: 98.3 F | DIASTOLIC BLOOD PRESSURE: 80 MMHG | HEART RATE: 82 BPM | SYSTOLIC BLOOD PRESSURE: 106 MMHG | BODY MASS INDEX: 24.59 KG/M2 | WEIGHT: 166 LBS | RESPIRATION RATE: 16 BRPM | HEIGHT: 69 IN | OXYGEN SATURATION: 98 %

## 2019-11-12 DIAGNOSIS — Z00.00 WELL ADULT EXAM: Primary | ICD-10-CM

## 2019-11-12 DIAGNOSIS — M79.605 BILATERAL LEG PAIN: ICD-10-CM

## 2019-11-12 DIAGNOSIS — R30.0 BURNING WITH URINATION: ICD-10-CM

## 2019-11-12 DIAGNOSIS — M79.604 BILATERAL LEG PAIN: ICD-10-CM

## 2019-11-12 DIAGNOSIS — R35.0 FREQUENCY OF URINATION: ICD-10-CM

## 2019-11-12 DIAGNOSIS — E78.1 HYPERTRIGLYCERIDEMIA: ICD-10-CM

## 2019-11-12 LAB
BILIRUB UR QL STRIP: NEGATIVE
GLUCOSE UR-MCNC: NEGATIVE MG/DL
KETONES P FAST UR STRIP-MCNC: NEGATIVE MG/DL
PH UR STRIP: 6 [PH] (ref 4.6–8)
PROT UR QL STRIP: NEGATIVE
SP GR UR STRIP: 1.02 (ref 1–1.03)
UA UROBILINOGEN AMB POC: NORMAL (ref 0.2–1)
URINALYSIS CLARITY POC: CLEAR
URINALYSIS COLOR POC: NORMAL
URINE BLOOD POC: NEGATIVE
URINE LEUKOCYTES POC: NEGATIVE
URINE NITRITES POC: NEGATIVE

## 2019-11-12 NOTE — PROGRESS NOTES
Comprehensive Physical Examination    Gabby Novak is a 62 y.o. female. she presents for a comprehensive physical examination. She has ongoing pain at her bilateral legs. Noted at the lower legs lateral calf and an aching. She notes this only at night in the bed. She notes that tylenol/advil makes the pain better. Trial of BID Aleve    She had shingles shot, was sick for 5 days after the initial shot, headache, intense for 12-24 hours after the shot. She then felt better after this. Got from [place] [id number]. Tolerating Cytomel, she follows up with Endocrine, Dr. Ivan Mckeon      Patient Active Problem List    Diagnosis Date Noted    Diverticulosis 10/03/2019    Neuropathic pain of both legs 09/28/2017    Hypertriglyceridemia 05/18/2017    Thyroid nodule 01/23/2017    Prediabetes 01/16/2017    Dysuria 11/05/2015    Dyslipidemia 03/23/2013    Hypothyroidism 03/12/2013    Allergic rhinitis 03/12/2013    Microscopic hematuria 03/12/2013     Current Outpatient Medications   Medication Sig Dispense Refill    liothyronine (CYTOMEL) 5 mcg tablet TAKE 1 TABLET BY MOUTH EVERY DAY 90 Tab 2    TURMERIC PO Take  by mouth.  fenofibrate nanocrystallized (TRICOR) 145 mg tablet TAKE 1 TABLET BY MOUTH EVERY DAY 90 Tab 1    ALPRAZolam (XANAX) 0.5 mg tablet Take 1 Tab by mouth three (3) times daily as needed for Anxiety. Max Daily Amount: 1.5 mg. 30 Tab 0    LEVOXYL 88 mcg tablet TAKE 1 TABLET BY MOUTH EVERY DAY BEFORE BREAKFAST (Patient taking differently: TAKE 1 TABLET BY MOUTH DAILY EXCEPT 1/2 TAB EVERY SUNDAY) 90 Tab 3    metFORMIN (GLUCOPHAGE) 500 mg tablet Take 0.5 Tabs by mouth two (2) times daily (with meals). 90 Tab 3    Cetirizine (ZYRTEC) 10 mg cap Zyrtec      ESTRACE 0.01 % (0.1 mg/gram) vaginal cream APPLY 1GM TO VAGINAL AREA EVERY DAY FOR 2 WKS AND THEN 2-3 X WEEKLY THEREAFTER  4    cholecalciferol, vitamin D3, 2,000 unit tab Take 2,000 Units by mouth daily.       magnesium oxide (MAG-OX) 400 mg tablet Take 400 mg by mouth daily.  VITAMIN E, DL,TOCOPHERYL ACET, (VITAMIN E ACETATE) 400 unit cap capsule Take  by mouth daily. Allergies   Allergen Reactions    Pcn [Penicillins] Nausea Only     Orally only,can take intravenously     Past Medical History:   Diagnosis Date    Adverse effect of anesthesia     anxiety with waking up    Asthma     hx of asthma r/t allergies/emotional response    Diabetes ([address]     pre diabetic/ no meds    Hx of mammogram 10/2/12    Ill-defined condition     low blood pressure    Nausea & vomiting     Nephrolithiasis     right kidney stone    Other ill-defined conditions(799.89)     echocardiogram,2010,  has murmur    Thyroid disease     enlarged & inflamed thryoid/ hypo      Past Surgical History:   Procedure Laterality Date    ABDOMEN SURGERY PROC UNLISTED      laparoscopy to check for endometriosis    COLONOSCOPY N/A 2018    COLON[facility] by [facility] at [facility]  2018         HX BUNIONECTOMY      ,right bunion    HX HERNIA REPAIR      umbilical    HX HYSTERECTOMY  2007    laparoscopic hysterecomy, rectocele repair, umbilical hernia repair, sling    HX ORTHOPAEDIC Right     foot-bunion with hardware    HX RECTOCELE REPAIR  2007    HX TONSILLECTOMY  age 8    HX UROLOGICAL  2007    urethral sling     Family History   Problem Relation Age of Onset    Thyroid Disease Father     Cancer Father         CML    Colon Polyps Father     Thyroid Disease Brother     Thyroid Cancer Brother     Heart Disease Maternal Grandfather     Heart Disease Paternal Grandfather      Social History     Tobacco Use    Smoking status: Former Smoker     Years: 10.00     Types: Cigarettes     Last attempt to quit: 1989     Years since quittin.3    Smokeless tobacco: Never Used    Tobacco comment: used to smoke 2-3 cigarettes a day   Substance Use Topics    Alcohol use:  No Health Maintenance   Topic Date Due    Shingrix Vaccine Age 49> (1 of 2) 03/30/2013    Influenza Age 5 to Adult  08/01/2019    PAP AKA CERVICAL CYTOLOGY  04/25/2020    BREAST CANCER SCRN MAMMOGRAM  06/27/2021    COLONOSCOPY  02/26/2023    DTaP/Tdap/Td series (2 - Td) 11/28/2026    Bone Densitometry (Dexa) Screening  03/30/2028    Hepatitis C Screening  Completed    Pneumococcal 0-64 years  Aged Out         Review of Systems   Constitutional: Negative. Respiratory: Negative. Cardiovascular: Negative. Gastrointestinal: Negative. Musculoskeletal:        Leg pains as per HPI         Visit Vitals  /80 (BP 1 Location: Right arm, BP Patient Position: Sitting)   Pulse 82   Temp 98.3 °F (36.8 °C) (Oral)   Resp 16   Ht 5' 8.75\" (1.746 m)   Wt 166 lb (75.3 kg)   SpO2 98%   BMI 24.69 kg/m²       Physical Exam      ASSESSMENT/PLAN  Diagnoses and all orders for this visit:    1. Well adult exam    2. Burning with urination - Follow up for any worsening. We will attempt to culture if possible. -     AMB POC URINALYSIS DIP STICK AUTO W/ MICRO    3. Hypertriglyceridemia    4. Frequency of urination    5. Bilateral leg pain - Suggested trial of NSAID for now. She will inform the office as to any improvement with this. Follow-up and Dispositions    · Return in about 6 months (around 5/12/2020) for Follow up.

## 2019-12-31 ENCOUNTER — OFFICE VISIT (OUTPATIENT)
Dept: INTERNAL MEDICINE CLINIC | Age: 54
End: 2019-12-31

## 2019-12-31 VITALS
RESPIRATION RATE: 14 BRPM | DIASTOLIC BLOOD PRESSURE: 60 MMHG | HEIGHT: 69 IN | SYSTOLIC BLOOD PRESSURE: 90 MMHG | BODY MASS INDEX: 24.32 KG/M2 | OXYGEN SATURATION: 98 % | HEART RATE: 69 BPM | WEIGHT: 164.2 LBS | TEMPERATURE: 97.9 F

## 2019-12-31 DIAGNOSIS — Z87.898 HISTORY OF VERTIGO: Primary | ICD-10-CM

## 2019-12-31 NOTE — PROGRESS NOTES
HISTORY OF PRESENT ILLNESS  Rajni Correa is a 62 y.o. female. Patient here for follow up vertigo. She was diagnosed with vertigo by [facility] on 12/14/19. She had been on a tacky light run and started suddenly having room spinning soon after the run. [place] started. She had vomiting and constant room spinning for about 2 hours. She rested in supine position and took meclizine and symptoms improved within a few hours. She had no recent illness. No congestion, headaches, weakness, or ear fullness. She has not had symptoms since that occurrence. Visit Vitals  BP 90/60 (BP 1 Location: Left arm, BP Patient Position: Sitting)   Pulse 69   Temp 97.9 °F (36.6 °C) (Oral)   Resp 14   Ht 5' 8.75\" (1.746 m)   Wt 164 lb 3.2 oz (74.5 kg)   SpO2 98%   BMI 24.42 kg/m²       HPI    Review of Systems   Neurological: Positive for dizziness. Physical Exam  Constitutional:       Appearance: Normal appearance. HENT:      Head: Normocephalic. Right Ear: Hearing, ear canal and external ear normal.      Left Ear: Hearing, tympanic membrane, ear canal and external ear normal.      Ears:      Comments: Slightly distorted TM on right     Nose: Nose normal.   Neck:      Musculoskeletal: Normal range of motion. Cardiovascular:      Rate and Rhythm: Normal rate and regular rhythm. Heart sounds: Normal heart sounds. Pulmonary:      Effort: Pulmonary effort is normal.      Breath sounds: Normal breath sounds. Skin:     General: Skin is warm and dry. Neurological:      General: No focal deficit present. Mental Status: She is alert and oriented to person, place, and time. Psychiatric:         Mood and Affect: Mood normal.         Behavior: Behavior normal.         ASSESSMENT and PLAN    ICD-10-CM ICD-9-CM    1. History of vertigo Z87.898 V12.49      No orders of the defined types were placed in this encounter.   consider vestibular therapy if symptoms return  Meclizine PRN

## 2019-12-31 NOTE — PATIENT INSTRUCTIONS
Vertigo: Exercises  Introduction  Here are some examples of exercises for you to try. The exercises may be suggested for a condition or for rehabilitation. Start each exercise slowly. Ease off the exercises if you start to have pain. You will be told when to start these exercises and which ones will work best for you. How to do the exercises  Exercise 1    1. Stand with a chair in front of you and a wall behind you. If you begin to fall, you may use them for support. 2. Stand with your feet together and your arms at your sides. 3. Move your head up and down 10 times. Exercise 2    1. Move your head side to side 10 times. Exercise 3    1. Move your head diagonally up and down 10 times. Exercise 4    1. Move your head diagonally up and down 10 times on the other side. Follow-up care is a key part of your treatment and safety. Be sure to make and go to all appointments, and call your doctor if you are having problems. It's also a good idea to know your test results and keep a list of the medicines you take. Where can you learn more? Go to http://jessica-faustina.info/. Enter F349 in the search box to learn more about \"Vertigo: Exercises. \"  Current as of: October 21, 2018  Content Version: 12.2  © 6224-2152 [employer]. Care instructions adapted under license by [employer] (which disclaims liability or warranty for this information). If you have questions about a medical condition or this instruction, always ask your healthcare professional. [place] [address] any warranty or liability for your use of this information. Vertigo: Care Instructions  Your Care Instructions    Vertigo is the feeling that you or your surroundings are moving when there is no actual movement. It is often described as a feeling of spinning, whirling, falling, or tilting. Vertigo may make you vomit or feel nauseated.  You may have trouble standing or walking and may lose your balance. Vertigo is often related to an inner ear problem, but it can have other more serious causes. If vertigo continues, you may need more tests to find its cause. Follow-up care is a key part of your treatment and safety. Be sure to make and go to all appointments, and call your doctor if you are having problems. It's also a good idea to know your test results and keep a list of the medicines you take. How can you care for yourself at home? · Do not lie flat on your back. Prop yourself up slightly. This may reduce the spinning feeling. Keep your eyes open. · Move slowly so that you do not fall. · If your doctor recommends medicine, take it exactly as directed. · Do not drive while you are having vertigo. Certain exercises, called Davis-Daroff exercises, can help decrease vertigo. To do Davis-Daroff exercises:  · Sit on the edge of a bed or sofa and quickly lie down on the side that causes the worst vertigo. Lie on your side with your ear down. · Stay in this position for at least 30 seconds or until the vertigo goes away. · Sit up. If this causes vertigo, wait for it to stop. · Repeat the procedure on the other side. · Repeat this 10 times. Do these exercises 2 times a day until the vertigo is gone. When should you call for help? Call 911 anytime you think you may need emergency care. For example, call if:    · You passed out (lost consciousness).     · You have symptoms of a stroke. These may include:  ? Sudden numbness, tingling, weakness, or loss of movement in your face, arm, or leg, especially on only one side of your body. ? Sudden vision changes. ? Sudden trouble speaking. ? Sudden confusion or trouble understanding simple statements. ? Sudden problems with walking or balance.   ? A sudden, severe headache that is different from past headaches.    Call your doctor now or seek immediate medical care if:    · Vertigo occurs with a fever, a headache, or ringing in your ears.     · You have new or increased nausea and vomiting.    Watch closely for changes in your health, and be sure to contact your doctor if:    · Vertigo gets worse or happens more often.     · Vertigo has not gotten better after 2 weeks. Where can you learn more? Go to http://jessica-faustina.info/. Enter W103 in the search box to learn more about \"Vertigo: Care Instructions. \"  Current as of: October 21, 2018  Content Version: 12.2  © 0920-9924 [employer]. Care instructions adapted under license by [employer] (which disclaims liability or warranty for this information). If you have questions about a medical condition or this instruction, always ask your healthcare professional. [address] any warranty or liability for your use of this information.

## 2019-12-31 NOTE — PROGRESS NOTES
Chief Complaint   Patient presents with    Follow-up     ER Visit 2019 vertigo     Reviewed record in preparation for visit and have obtained necessary documentation. Identified pt with two pt identifiers(name and ). Health Maintenance Due   Topic    Shingrix Vaccine Age 50> (1 of 2)    PAP AKA CERVICAL CYTOLOGY          Chief Complaint   Patient presents with    Follow-up     ER Visit 2019 vertigo        Wt Readings from Last 3 Encounters:   19 164 lb 3.2 oz (74.5 kg)   19 166 lb (75.3 kg)   10/03/19 165 lb (74.8 kg)     Temp Readings from Last 3 Encounters:   19 97.9 °F (36.6 °C) (Oral)   19 98.3 °F (36.8 °C) (Oral)   10/03/19 97.9 °F (36.6 °C) (Oral)     BP Readings from Last 3 Encounters:   19 90/60   19 106/80   10/03/19 100/66     Pulse Readings from Last 3 Encounters:   19 69   19 82   10/03/19 69           Learning Assessment:  :     Learning Assessment 2017   PRIMARY LEARNER Patient Patient Patient   HIGHEST LEVEL OF EDUCATION - PRIMARY LEARNER  TRADE SCHOOL TRADE SCHOOL TRADE SCHOOL   BARRIERS PRIMARY LEARNER NONE NONE NONE   CO-LEARNER CAREGIVER No - -   BARRIERS CO-LEARNER NONE - -   PRIMARY LANGUAGE ENGLISH ENGLISH ENGLISH   LEARNER PREFERENCE PRIMARY LISTENING OTHER (COMMENT) LISTENING   ANSWERED BY patient patient  patient   RELATIONSHIP SELF SELF SELF       Depression Screening:  :     3 most recent PHQ Screens 2019   Little interest or pleasure in doing things Not at all   Feeling down, depressed, irritable, or hopeless Not at all   Total Score PHQ 2 0       Fall Risk Assessment:  :     No flowsheet data found. Abuse Screening:  :     Abuse Screening Questionnaire 2018   Do you ever feel afraid of your partner? N   Are you in a relationship with someone who physically or mentally threatens you? N   Is it safe for you to go home?  Y       Coordination of Care Questionnaire:  :     1) Have you been to an emergency room, urgent care clinic since your last visit? no   Hospitalized since your last visit? no             2) Have you seen or consulted any other health care providers outside of [address] [place] [address] since your last visit? no  (Include any pap smears or colon screenings in this section.)    3) Do you have an Advance Directive on file? no    4) Are you interested in receiving information on Advance Directives? NO      Patient is accompanied by self I have received verbal consent from Manasa Given to discuss any/all medical information while they are present in the room. Reviewed record  In preparation for visit and have obtained necessary documentation.

## 2020-02-07 RX ORDER — METFORMIN HYDROCHLORIDE 500 MG/1
250 TABLET ORAL 2 TIMES DAILY WITH MEALS
Qty: 90 TAB | Refills: 3 | Status: SHIPPED | OUTPATIENT
Start: 2020-02-07 | End: 2020-02-18

## 2020-02-07 RX ORDER — LEVOTHYROXINE SODIUM 88 UG/1
TABLET ORAL
Qty: 90 TAB | Refills: 3 | Status: SHIPPED | OUTPATIENT
Start: 2020-02-07 | End: 2021-02-26 | Stop reason: SDUPTHER

## 2020-02-07 NOTE — TELEPHONE ENCOUNTER
Refill request. Metformin and Levoxyl. Call into [facility] at [address]. Thanks.

## 2020-02-17 RX ORDER — FENOFIBRATE 145 MG/1
TABLET, COATED ORAL
Qty: 90 TAB | Refills: 1 | Status: SHIPPED | OUTPATIENT
Start: 2020-02-17 | End: 2020-08-27

## 2020-02-18 RX ORDER — METFORMIN HYDROCHLORIDE 500 MG/1
250 TABLET ORAL 2 TIMES DAILY WITH MEALS
Qty: 90 TAB | Refills: 3 | Status: SHIPPED | OUTPATIENT
Start: 2020-02-18 | End: 2021-02-26 | Stop reason: SDUPTHER

## 2020-03-11 ENCOUNTER — OFFICE VISIT (OUTPATIENT)
Dept: INTERNAL MEDICINE CLINIC | Age: 55
End: 2020-03-11

## 2020-03-11 VITALS
HEART RATE: 72 BPM | TEMPERATURE: 97.9 F | HEIGHT: 69 IN | OXYGEN SATURATION: 99 % | DIASTOLIC BLOOD PRESSURE: 72 MMHG | RESPIRATION RATE: 16 BRPM | SYSTOLIC BLOOD PRESSURE: 115 MMHG | WEIGHT: 165.8 LBS | BODY MASS INDEX: 24.56 KG/M2

## 2020-03-11 DIAGNOSIS — J02.9 SORE THROAT: Primary | ICD-10-CM

## 2020-03-11 LAB
S PYO AG THROAT QL: NEGATIVE
VALID INTERNAL CONTROL?: YES

## 2020-03-11 NOTE — PATIENT INSTRUCTIONS
Sore Throat: Care Instructions  Your Care Instructions    Infection by bacteria or a virus causes most sore throats. Cigarette smoke, dry air, air pollution, allergies, and yelling can also cause a sore throat. Sore throats can be painful and annoying. Fortunately, most sore throats go away on their own. If you have a bacterial infection, your doctor may prescribe antibiotics. Follow-up care is a key part of your treatment and safety. Be sure to make and go to all appointments, and call your doctor if you are having problems. It's also a good idea to know your test results and keep a list of the medicines you take. How can you care for yourself at home? · If your doctor prescribed antibiotics, take them as directed. Do not stop taking them just because you feel better. You need to take the full course of antibiotics. · Gargle with warm salt water once an hour to help reduce swelling and relieve discomfort. Use 1 teaspoon of salt mixed in 1 cup of warm water. · Take an over-the-counter pain medicine, such as acetaminophen (Tylenol), ibuprofen (Advil, Motrin), or naproxen (Aleve). Read and follow all instructions on the label. · Be careful when taking over-the-counter cold or flu medicines and Tylenol at the same time. Many of these medicines have acetaminophen, which is Tylenol. Read the labels to make sure that you are not taking more than the recommended dose. Too much acetaminophen (Tylenol) can be harmful. · Drink plenty of fluids. Fluids may help soothe an irritated throat. Hot fluids, such as tea or soup, may help decrease throat pain. · Use over-the-counter throat lozenges to soothe pain. Regular cough drops or hard candy may also help. These should not be given to young children because of the risk of choking. · Do not smoke or allow others to smoke around you. If you need help quitting, talk to your doctor about stop-smoking programs and medicines.  These can increase your chances of quitting for good. · Use a vaporizer or humidifier to add moisture to your bedroom. Follow the directions for cleaning the machine. When should you call for help? Call your doctor now or seek immediate medical care if:    · You have new or worse trouble swallowing.     · Your sore throat gets much worse on one side.    Watch closely for changes in your health, and be sure to contact your doctor if you do not get better as expected. Where can you learn more? Go to http://jessica-faustina.info/. Enter [phone] [id number] in the search box to learn more about \"Sore Throat: Care Instructions. \"  Current as of: October 21, 2018  Content Version: 12.2  © 3285-7312 [employer]. Care instructions adapted under license by [employer] (which disclaims liability or warranty for this information). If you have questions about a medical condition or this instruction, always ask your healthcare professional. [address] any warranty or liability for your use of this information.

## 2020-03-11 NOTE — PROGRESS NOTES
HISTORY OF PRESENT ILLNESS  Barbara Moses is a 62 y.o. female. Patient reports sore throat x 1 week. No fever or chills, but does note mild body aches. She has been taking motrin and zyrtec with little relief. Visit Vitals  /72   Pulse 72   Temp 97.9 °F (36.6 °C)   Resp 16   Ht 5' 8.75\" (1.746 m)   Wt 165 lb 12.8 oz (75.2 kg)   SpO2 99%   BMI 24.66 kg/m²       HPI    Review of Systems   HENT: Positive for sore throat. Physical Exam  Constitutional:       Appearance: Normal appearance. HENT:      Head: Normocephalic. Right Ear: Tympanic membrane, ear canal and external ear normal.      Left Ear: Tympanic membrane, ear canal and external ear normal.      Nose:      Comments: Pale and boggy     Mouth/Throat:      Mouth: Mucous membranes are moist.      Comments: Post-pharyngeal cobblestoning  Neck:      Musculoskeletal: Normal range of motion. Cardiovascular:      Rate and Rhythm: Normal rate and regular rhythm. Pulmonary:      Effort: Pulmonary effort is normal.      Breath sounds: Normal breath sounds. Lymphadenopathy:      Cervical: No cervical adenopathy. Skin:     General: Skin is warm and dry. Neurological:      General: No focal deficit present. Mental Status: She is alert and oriented to person, place, and time. Psychiatric:         Mood and Affect: Mood normal.         Behavior: Behavior normal.       Results for orders placed or performed in visit on 03/11/20   AMB POC RAPID STREP A   Result Value Ref Range    VALID INTERNAL CONTROL POC Yes     Group A Strep Ag Negative Negative       ASSESSMENT and PLAN    ICD-10-CM ICD-9-CM    1.  Sore throat J02.9 462 AMB POC RAPID STREP A     Orders Placed This Encounter    AMB POC RAPID STREP A   appears allergy-related- start flonase, continue zyrtec  Follow up if no improvement

## 2020-04-10 ENCOUNTER — TELEPHONE (OUTPATIENT)
Dept: ENDOCRINOLOGY | Age: 55
End: 2020-04-10

## 2020-04-10 NOTE — TELEPHONE ENCOUNTER
I called Ms. Fitzgerald and went over the doxy virtual visit. She has labs ordered in the computer and will have them drawn on the 13th for her 20th appointment.   Yojana Brandon

## 2020-04-10 NOTE — TELEPHONE ENCOUNTER
Pt was setup for a doxy virtual visit on 4/20/20 and stated that she will have her labs done next week. FYI, pt does not have her lab orders and will need new orders in the [facility] care in order to have her labs drawn. The orders that is currently in the system is greater than 6 months old.

## 2020-04-10 NOTE — TELEPHONE ENCOUNTER
----- Message from Shagufta Peck sent at 4/9/2020 12:49 PM EDT -----  Regarding: /telephone  General Message/Vendor Calls    Caller's first and last name:      Reason for call: The pt would like to confirm if she is to come in or have a virtual appt on 4/20/20.       Callback required yes/no and why:yes      Best contact number(s):[phone]

## 2020-04-14 LAB
BUN SERPL-MCNC: 20 MG/DL (ref 6–24)
BUN/CREAT SERPL: 21 (ref 9–23)
CALCIUM SERPL-MCNC: 9.4 MG/DL (ref 8.7–10.2)
CHLORIDE SERPL-SCNC: 107 MMOL/L (ref 96–106)
CHOLEST SERPL-MCNC: 180 MG/DL (ref 100–199)
CO2 SERPL-SCNC: 21 MMOL/L (ref 20–29)
CREAT SERPL-MCNC: 0.97 MG/DL (ref 0.57–1)
EST. AVERAGE GLUCOSE BLD GHB EST-MCNC: 123 MG/DL
GLUCOSE SERPL-MCNC: 94 MG/DL (ref 65–99)
HBA1C MFR BLD: 5.9 % (ref 4.8–5.6)
HDLC SERPL-MCNC: 39 MG/DL
INTERPRETATION, 910389: NORMAL
LDLC SERPL CALC-MCNC: 109 MG/DL (ref 0–99)
POTASSIUM SERPL-SCNC: 4.2 MMOL/L (ref 3.5–5.2)
SODIUM SERPL-SCNC: 140 MMOL/L (ref 134–144)
T4 FREE SERPL-MCNC: 1.33 NG/DL (ref 0.82–1.77)
TRIGL SERPL-MCNC: 162 MG/DL (ref 0–149)
TSH SERPL DL<=0.005 MIU/L-ACNC: 0.69 UIU/ML (ref 0.45–4.5)
VLDLC SERPL CALC-MCNC: 32 MG/DL (ref 5–40)

## 2020-04-20 ENCOUNTER — VIRTUAL VISIT (OUTPATIENT)
Dept: ENDOCRINOLOGY | Age: 55
End: 2020-04-20

## 2020-04-20 DIAGNOSIS — R73.03 PREDIABETES: ICD-10-CM

## 2020-04-20 DIAGNOSIS — E03.9 ACQUIRED HYPOTHYROIDISM: Primary | ICD-10-CM

## 2020-04-20 DIAGNOSIS — E78.1 HYPERTRIGLYCERIDEMIA: ICD-10-CM

## 2020-04-20 DIAGNOSIS — E04.1 THYROID NODULE: ICD-10-CM

## 2020-04-20 RX ORDER — LIOTHYRONINE SODIUM 5 UG/1
TABLET ORAL
Qty: 90 TAB | Refills: 3 | Status: SHIPPED | OUTPATIENT
Start: 2020-04-20 | End: 2021-02-26 | Stop reason: SDUPTHER

## 2020-04-20 NOTE — PROGRESS NOTES
**DUE TO PANDEMIC AND HEALTH CONCERNS IN THE COMMUNITY, THIS PATIENT WAS EITHER ILL OR FOUND TO BE HIGH RISK FOR IN-PERSON EVALUATION WITHIN THE CLINIC. THE FOLLOWING IS A VIRTUAL TELEMEDICINE VIDEO ENCOUNTER VIA [employer], TO WHICH THE PATIENT AGREED. THE PURPOSE IS TO LIMIT INTERRUPTIONS IN HEALTHCARE AND TO PROVIDE FOR ONGOING URGENT NEEDS UNDER THE CURRENT CONDITIONS. CHIEF COMPLAINT: f/u evaluation for hypothyroidism    HISTORY OF PRESENT ILLNESS:   Saúl Yanes is a 60 y.o. female with a PMHx as noted below who presents to the endocrinology clinic for f/u evaluation of hypothyroidism. Hypothyroidism:   Levoxyl 88 mcg daily  Cytomel 5 mcg daily (started prev by Dr. Gigi Blank)  Not missing doses, taking correctly,  TSH 0.685 and stable compared with prior labs,  Denies symptoms of hyper or hypothyroidism. Prediabetes:   Metformin 250mg BID,   A1c 5.9%, prev 5.8%,   Does not tolerate higher doses of metformin,    Thyroid nodule:   1/20/17: Thyroid Ultrasound 0.5 cm x 7 mm x 1.2 cm left superior nodule  Surveillance US selected by patient per Dr. Gigi Blank,  10/9/19: Repeat thyroid ultrasound: stable nodule with little change, no other nodule  No dysphagia    Lipids:  On fenofibrate, [id number], improved from last visit, Total chol 180,    Review of most recent thyroid function:  Lab Results   Component Value Date    TSH 0.685 04/13/2020    TSH 0.628 09/19/2019    TSH 0.230 (L) 05/09/2019    FT4 1.33 04/13/2020    FT4 1.28 09/19/2019    FT4 1.47 05/09/2019    FRT3 3.9 05/09/2019      Thyroid Lab Key:  TSILT = Thyroid stimulating antibodies  TRALT = TSH Receptor Antibodies  TMCLT = TPO antibodies  T3LT = Total T3 levels  924742 = Direct FT4  824935 = Free T3    PAST MEDICAL/SURGICAL HISTORY:   Past Medical History:   Diagnosis Date    Adverse effect of anesthesia     anxiety with waking up    Asthma     hx of asthma r/t allergies/emotional response    Diabetes ([address]     pre diabetic/ no meds    Hx of mammogram 10/2/12    Ill-defined condition     low blood pressure    Nausea & vomiting     Nephrolithiasis     right kidney stone    Other ill-defined conditions(799.89)     echocardiogram,12/2010,  has murmur    Thyroid disease     enlarged & inflamed thryoid/ hypo      Past Surgical History:   Procedure Laterality Date    ABDOMEN SURGERY PROC UNLISTED      laparoscopy to check for endometriosis    COLONOSCOPY N/A 2/26/2018    COLONOS[facility]  2/26/2018         HX BUNIONECTOMY  2001    ,right bunion    HX HERNIA REPAIR      umbilical    HX HYSTERECTOMY  2007    laparoscopic hysterecomy, rectocele repair, umbilical hernia repair, sling    HX ORTHOPAEDIC Right 2001    foot-bunion with hardware    HX RECTOCELE REPAIR  2007    HX TONSILLECTOMY  age 8   [facility]  2007    urethral sling       ALLERGIES:   Allergies   Allergen Reactions    Pcn [Penicillins] Nausea Only     Orally only,can take intravenously       MEDICATIONS ON ADMISSION:     Current Outpatient Medications:     horse chestnut seed (HORSE CHESTNUT PO), Take  by mouth., Disp: , Rfl:     metFORMIN (GLUCOPHAGE) 500 mg tablet, TAKE 0.5 TABS BY MOUTH TWO (2) TIMES DAILY (WITH MEALS). , Disp: 90 Tab, Rfl: 3    fenofibrate nanocrystallized (TRICOR) 145 mg tablet, TAKE 1 TABLET BY MOUTH EVERY DAY, Disp: 90 Tab, Rfl: 1    LEVOXYL 88 mcg tablet, TAKE 1 TABLET BY MOUTH EVERY DAY BEFORE BREAKFAST, Disp: 90 Tab, Rfl: 3    liothyronine (CYTOMEL) 5 mcg tablet, TAKE 1 TABLET BY MOUTH EVERY DAY, Disp: 90 Tab, Rfl: 2    TURMERIC PO, Take  by mouth., Disp: , Rfl:     Cetirizine (ZYRTEC) 10 mg cap, Zyrtec, Disp: , Rfl:     ESTRACE 0.01 % (0.1 mg/gram) vaginal cream, APPLY 1GM TO VAGINAL AREA EVERY DAY FOR 2 WKS AND THEN 2-3 X WEEKLY THEREAFTER, Disp: , Rfl: 4    cholecalciferol, vitamin D3, 2,000 unit tab, Take 2,000 Units by mouth daily. , Disp: , Rfl:     magnesium oxide (MAG-OX) 400 mg tablet, Take 400 mg by mouth daily. , Disp: , Rfl:     VITAMIN E, DL,TOCOPHERYL ACET, (VITAMIN E ACETATE) 400 unit cap capsule, Take  by mouth daily. , Disp: , Rfl:     ALPRAZolam (XANAX) 0.5 mg tablet, Take 1 Tab by mouth three (3) times daily as needed for Anxiety.  Max Daily Amount: 1.5 mg., Disp: 30 Tab, Rfl: 0    SOCIAL HISTORY:   Social History     Socioeconomic History    Marital status:      Spouse name:     Number of children: 3    Years of education: Not on file    Highest education level: Not on file   Occupational History    Occupation: [employer]     Employer: [employer]   Social Needs    Financial resource strain: Not on file    Food insecurity     Worry: Not on file     Inability: Not on file   [employer] needs     Medical: Not on file     Non-medical: Not on file   Tobacco Use    Smoking status: Former Smoker     Packs/day: 0.10     Years: 10.00     Pack years: 1.00     Types: Cigarettes     Last attempt to quit: 1989     Years since quittin.7    Smokeless tobacco: Never Used    Tobacco comment: used to smoke 2-3 cigarettes a day   Substance and Sexual Activity    Alcohol use: No    Drug use: No     Types: OTC    Sexual activity: Not Currently   Lifestyle    Physical activity     Days per week: Not on file     Minutes per session: Not on file    Stress: Not on file   Relationships    Social connections     Talks on phone: Not on file     Gets together: Not on file     Attends Hinduism service: Not on file     Active member of club or organization: Not on file     Attends meetings of clubs or organizations: Not on file     Relationship status: Not on file    Intimate partner violence     Fear of current or ex partner: Not on file     Emotionally abused: Not on file     Physically abused: Not on file     Forced sexual activity: Not on file   Other Topics Concern   [address] Not Asked    Blood Transfusions Not Asked    Caffeine Concern Not Asked    Occupational Exposure Not Asked    Hobby Hazards Not Asked    Sleep Concern Not Asked    Stress Concern Not Asked    Weight Concern Not Asked    Special Diet Not Asked    Back Care Not Asked    Exercise No     Comment: runs regularly    Bike Helmet Not Asked   [address] [facility] Not Asked    Self-Exams Not Asked   Social History Narrative    One son, two daughters       FAMILY HISTORY:  Family History   Problem Relation Age of Onset    Thyroid Disease Father     Cancer Father         CML    Colon Polyps Father     Thyroid Disease Brother     Thyroid Cancer Brother     Heart Disease Maternal Grandfather     Heart Disease Paternal Grandfather        REVIEW OF SYSTEMS: Complete ROS assessed and noted for that which is described above, all else are negative. Eyes: normal  ENT: normal  CVS: normal  Resp: normal  GI: normal  : normal  GYN: normal  Endocrine: normal  Integument: normal  Musculoskeletal: normal  Neuro: normal  Psych: normal    PHYSICAL EXAMINATION:    VITAL SIGNS:  Telemedicine Visit    GENERAL: NCAT, Appears well nourished  EYES: EOMI, non-icteric, no proptosis  Ear/Nose/Throat: NCAT, no visible inflammation or masses  CARDIOVASCULAR: no cyanosis, no visible JVD  RESPIRATORY: comfortable respirations observed, no cyanosis  MUSCULOSKELETAL: Normal ROM of upper extremities observed  SKIN: No edema, rash, or other significant changes observed  NEUROLOGIC:  AAOx3  PSYCHIATRIC: Normal affect, Normal insight and judgement    REVIEW OF LABORATORY AND RADIOLOGY DATA:   Labs and documentation have been reviewed as described above. ASSESSMENT AND PLAN:   Vincent Buchanan is a 60 y.o. female with a PMHx as noted above who presents to the endocrinology clinic for the f/u evaluation of hypothyroidism.     Hypothyroidism:   Levoxyl 88 mcg daily  Cytomel 5 mcg daily  Stable, prelabs before next visit    Prediabetes:   Metformin 250mg twice daily ok for now,   Diet/exercise emphasized,    Thyroid nodule:   Stable on repeat ultrasound,   Next Ultrasound can be in 2022 as long as she remains asymptomatic    Lipids: On fenofibrate, LDL above goal but improved, again advised about statin to consider,    RTC in 6 months with prelabs (October 6th at 8:30 AM)    20 minutes spent toward telemedicine visit today of which >50% of this time was spent in counseling and coordination of care. Stewart Mojica.  [address] [facility]

## 2020-05-28 ENCOUNTER — TELEPHONE (OUTPATIENT)
Dept: INTERNAL MEDICINE CLINIC | Age: 55
End: 2020-05-28

## 2020-05-28 NOTE — TELEPHONE ENCOUNTER
----- Message from Munira Rajput sent at 5/28/2020  7:35 AM EDT -----  Regarding: Dr. Ryann Frias first and last name:Staci Palaciopoli      Reason for call:poss uti, requested in office visit      Callback required yes/no and why:yes      Best contact number(s):[phone]      Details to clarify the request:Pt stated she has a poss uti and requested an in office visit.       Munira Rajput

## 2020-05-28 NOTE — TELEPHONE ENCOUNTER
Patient experiencing UTI sx. Bladder surgery in past. Sx include  frequent urination and burning. No odor. Patient scheduled for in person visit tomorrow. Provided protocol prior to visit. Travel screening complete. Understanding verbalized

## 2020-05-29 ENCOUNTER — OFFICE VISIT (OUTPATIENT)
Dept: INTERNAL MEDICINE CLINIC | Age: 55
End: 2020-05-29

## 2020-05-29 VITALS
OXYGEN SATURATION: 100 % | TEMPERATURE: 96.9 F | RESPIRATION RATE: 18 BRPM | HEART RATE: 73 BPM | BODY MASS INDEX: 24.73 KG/M2 | HEIGHT: 69 IN | DIASTOLIC BLOOD PRESSURE: 78 MMHG | WEIGHT: 167 LBS | SYSTOLIC BLOOD PRESSURE: 116 MMHG

## 2020-05-29 DIAGNOSIS — R82.90 ABNORMAL URINALYSIS: ICD-10-CM

## 2020-05-29 DIAGNOSIS — R35.0 URINARY FREQUENCY: ICD-10-CM

## 2020-05-29 DIAGNOSIS — R30.0 BURNING WITH URINATION: Primary | ICD-10-CM

## 2020-05-29 LAB
BILIRUB UR QL STRIP: NEGATIVE
GLUCOSE UR-MCNC: NEGATIVE MG/DL
KETONES P FAST UR STRIP-MCNC: NEGATIVE MG/DL
PH UR STRIP: 7 [PH] (ref 4.6–8)
PROT UR QL STRIP: NEGATIVE
SP GR UR STRIP: 1.02 (ref 1–1.03)
UA UROBILINOGEN AMB POC: NORMAL (ref 0.2–1)
URINALYSIS CLARITY POC: CLEAR
URINALYSIS COLOR POC: YELLOW
URINE BLOOD POC: NEGATIVE
URINE LEUKOCYTES POC: NORMAL
URINE NITRITES POC: NEGATIVE

## 2020-05-29 RX ORDER — NITROFURANTOIN 25; 75 MG/1; MG/1
100 CAPSULE ORAL 2 TIMES DAILY
Qty: 10 CAP | Refills: 0 | Status: SHIPPED | OUTPATIENT
Start: 2020-05-29 | End: 2020-06-01 | Stop reason: SDUPTHER

## 2020-05-29 RX ORDER — PHENAZOPYRIDINE HYDROCHLORIDE 200 MG/1
200 TABLET, FILM COATED ORAL
Qty: 6 TAB | Refills: 0 | Status: SHIPPED | OUTPATIENT
Start: 2020-05-29 | End: 2020-06-01

## 2020-05-31 LAB — BACTERIA UR CULT: ABNORMAL

## 2020-06-01 ENCOUNTER — TELEPHONE (OUTPATIENT)
Dept: INTERNAL MEDICINE CLINIC | Age: 55
End: 2020-06-01

## 2020-06-01 DIAGNOSIS — R30.0 DYSURIA: Primary | ICD-10-CM

## 2020-06-01 RX ORDER — NITROFURANTOIN 25; 75 MG/1; MG/1
100 CAPSULE ORAL 2 TIMES DAILY
Qty: 4 CAP | Refills: 0 | Status: SHIPPED | OUTPATIENT
Start: 2020-06-01 | End: 2020-06-03

## 2020-06-01 NOTE — TELEPHONE ENCOUNTER
Patient states frequent UTI in the past and need script for Macrobid additional 2 days. Only given 5 days but in past takes a total of 7 days to clear up infection. [place] [facility] [place]  Advised will forward

## 2020-06-01 NOTE — TELEPHONE ENCOUNTER
Jayna Enciso (Self) [phone] (H)     Pt says that she had a visit with geri on Friday for a uti and given a rx for microbid for 5 days, [place] [phone]   She would also like a copy of lab results sent to dr Kodi Navarro, she did not have a fax number

## 2020-06-01 NOTE — TELEPHONE ENCOUNTER
Received VRBO from Dr Carlos Valera to send [facility] for additional 2 days, notified patient. Understanding verbalized.

## 2020-08-07 ENCOUNTER — HOSPITAL ENCOUNTER (OUTPATIENT)
Dept: MAMMOGRAPHY | Age: 55
Discharge: HOME | End: 2020-08-07
Attending: INTERNAL MEDICINE
Payer: COMMERCIAL

## 2020-08-07 DIAGNOSIS — Z12.31 SCREENING MAMMOGRAM FOR HIGH-RISK PATIENT: ICD-10-CM

## 2020-08-07 PROCEDURE — 77063 BREAST TOMOSYNTHESIS BI: CPT

## 2020-08-29 ENCOUNTER — OFFICE VISIT (OUTPATIENT)
Dept: PRIMARY CARE CLINIC | Age: 55
End: 2020-08-29

## 2020-10-09 LAB
EST. AVERAGE GLUCOSE BLD GHB EST-MCNC: 123 MG/DL
HBA1C MFR BLD: 5.9 % (ref 4.8–5.6)
T4 FREE SERPL-MCNC: 1.26 NG/DL (ref 0.82–1.77)
TSH SERPL DL<=0.005 MIU/L-ACNC: 0.71 UIU/ML (ref 0.45–4.5)

## 2020-10-26 ENCOUNTER — TELEPHONE (OUTPATIENT)
Dept: ENDOCRINOLOGY | Age: 55
End: 2020-10-26

## 2020-10-26 NOTE — TELEPHONE ENCOUNTER
----- Message from Erik Leone sent at 10/26/2020 12:56 PM EDT -----  Regarding: Dr Narinder Villarreal  General Message/Vendor Calls    Caller's first and last name:      Reason for call:pt said she received a reminder call that her appt on 10/29/2020 will be a virtual visit pt would like to know if it can be switched back to an in patient visit, she had a virtual visit last time in April in 2020 and she would now like for this appt to be  an in office appt       Callback required yes/no and why:yes for reason given above       Best contact number(s):[phone], pt said please leave a  detailed message with a call back she sees pts also and may not be able to answer the phone.        Details to clarify the request:      Erik Leone

## 2020-10-26 NOTE — TELEPHONE ENCOUNTER
I attempted to return this call and reached her voice mail.  I left a message letting her know that at this time we are not seeing patients in the office and the 10/29 visit is virtual.  Roberto Powell

## 2020-10-29 ENCOUNTER — HOSPITAL ENCOUNTER (OUTPATIENT)
Dept: GENERAL RADIOLOGY | Age: 55
Discharge: HOME | End: 2020-10-29
Payer: COMMERCIAL

## 2020-10-29 ENCOUNTER — VIRTUAL VISIT (OUTPATIENT)
Dept: ENDOCRINOLOGY | Age: 55
End: 2020-10-29
Payer: COMMERCIAL

## 2020-10-29 ENCOUNTER — TRANSCRIBE ORDER (OUTPATIENT)
Dept: REGISTRATION | Age: 55
End: 2020-10-29

## 2020-10-29 DIAGNOSIS — R05.9 COUGH: Primary | ICD-10-CM

## 2020-10-29 DIAGNOSIS — E04.1 THYROID NODULE: ICD-10-CM

## 2020-10-29 DIAGNOSIS — R05.9 COUGH: ICD-10-CM

## 2020-10-29 DIAGNOSIS — E78.1 HYPERTRIGLYCERIDEMIA: ICD-10-CM

## 2020-10-29 DIAGNOSIS — E03.9 ACQUIRED HYPOTHYROIDISM: Primary | ICD-10-CM

## 2020-10-29 DIAGNOSIS — R73.03 PREDIABETES: ICD-10-CM

## 2020-10-29 PROCEDURE — 71046 X-RAY EXAM CHEST 2 VIEWS: CPT

## 2020-10-29 PROCEDURE — 99214 OFFICE O/P EST MOD 30 MIN: CPT | Performed by: INTERNAL MEDICINE

## 2020-10-29 NOTE — PROGRESS NOTES
**DUE TO PANDEMIC AND HEALTH CONCERNS IN THE COMMUNITY, THIS PATIENT WAS EITHER ILL OR FOUND TO BE HIGH RISK FOR IN-PERSON EVALUATION WITHIN THE CLINIC. THE FOLLOWING IS A VIRTUAL TELEMEDICINE VIDEO ENCOUNTER VIA [employer], TO WHICH THE PATIENT AGREED. THE PURPOSE IS TO LIMIT INTERRUPTIONS IN HEALTHCARE AND TO PROVIDE FOR ONGOING URGENT NEEDS UNDER THE CURRENT CONDITIONS. CHIEF COMPLAINT: f/u evaluation for hypothyroidism    HISTORY OF PRESENT ILLNESS:   Yovani Lockett is a 60 y.o. female with a PMHx as noted below who presents to the endocrinology clinic for f/u evaluation of hypothyroidism. Hypothyroidism:   Levoxyl 88 mcg daily  Cytomel 5 mcg daily (started prev by Dr. David Mcdonald)  Not missing doses, taking correctly,  Thyroid levels remain stable at this time, see levels below,   No overt symptoms of hyper or hypothyroidism. Prediabetes:   Metformin 250mg BID, does not tolerate more than this,  A1c 5.9% again, stable,     Thyroid nodule:   1/20/17: Thyroid Ultrasound 0.5 cm x 7 mm x 1.2 cm left superior nodule  Surveillance US selected by patient per Dr. David Mcdonald,  10/9/19: Repeat thyroid ultrasound: stable nodule with little change, no other nodule  Reports brother had a thyroid cancer removed 2 years ago, + recent NET also  She is swallowing well at this time,     Lipids:  On fenofibrate, VIR995, improved from last visit, Total chol 180,    Review of most recent thyroid function:  Lab Results   Component Value Date    TSH 0.713 10/08/2020    TSH 0.685 04/13/2020    TSH 0.628 09/19/2019    FT4 1.26 10/08/2020    FT4 1.33 04/13/2020    FT4 1.28 09/19/2019    FRT3 3.9 05/09/2019      Thyroid Lab Key:  TSILT = Thyroid stimulating antibodies  TRALT = TSH Receptor Antibodies  TMCLT = TPO antibodies  T3LT = Total T3 levels  [id number] = Direct FT4  [id number] = Free T3    PAST MEDICAL/SURGICAL HISTORY:   Past Medical History:   Diagnosis Date    Adverse effect of anesthesia     anxiety with waking up    Asthma     hx of asthma r/t allergies/emotional response    Diabetes ([facility] [address]     pre diabetic/ no meds    Hx of mammogram 10/2/12    Ill-defined condition     low blood pressure    Nausea & vomiting     Nephrolithiasis     right kidney stone    Other ill-defined conditions(799.89)     echocardiogram,12/2010,  has murmur    Thyroid disease     enlarged & inflamed thryoid/ hypo      Past Surgical History:   Procedure Laterality Date    ABDOMEN SURGERY PROC UNLISTED      laparoscopy to check for endometriosis    COLONOSCOPY N/A 2/26/2018    [facility]/26/2018         HX BUNIONECTOMY  2001    ,right bunion    HX HERNIA REPAIR      umbilical    HX HYSTERECTOMY  2007    laparoscopic hysterecomy, rectocele repair, umbilical hernia repair, sling    HX ORTHOPAEDIC Right 2001    foot-bunion with hardware    HX RECTOCELE REPAIR  2007    HX TONSILLECTOMY  age 8   [facility]  2007    urethral sling       ALLERGIES:   Allergies   Allergen Reactions    Pcn [Penicillins] Nausea Only     Orally only,can take intravenously       MEDICATIONS ON ADMISSION:     Current Outpatient Medications:     ZINC PO, Take  by mouth., Disp: , Rfl:     cranberry fruit extract (CRANBERRY PO), Take  by mouth., Disp: , Rfl:     fenofibrate nanocrystallized (TRICOR) 145 mg tablet, TAKE ONE TABLET BY MOUTH DAILY, Disp: 90 Tab, Rfl: 3    liothyronine (CYTOMEL) 5 mcg tablet, TAKE 1 TABLET BY MOUTH EVERY DAY, Disp: 90 Tab, Rfl: 3    metFORMIN (GLUCOPHAGE) 500 mg tablet, TAKE 0.5 TABS BY MOUTH TWO (2) TIMES DAILY (WITH MEALS). , Disp: 90 Tab, Rfl: 3    LEVOXYL 88 mcg tablet, TAKE 1 TABLET BY MOUTH EVERY DAY BEFORE BREAKFAST, Disp: 90 Tab, Rfl: 3    TURMERIC PO, Take  by mouth., Disp: , Rfl:     Cetirizine (ZYRTEC) 10 mg cap, Zyrtec, Disp: , Rfl:     cholecalciferol, vitamin D3, 2,000 unit tab, Take 2,000 Units by mouth daily. , Disp: , Rfl:     VITAMIN E, DL,TOCOPHERYL ACET, (VITAMIN E ACETATE) 400 unit cap capsule, Take  by mouth daily. , Disp: , Rfl:     horse chestnut seed (HORSE CHESTNUT PO), Take  by mouth., Disp: , Rfl:     ALPRAZolam (XANAX) 0.5 mg tablet, Take 1 Tab by mouth three (3) times daily as needed for Anxiety. Max Daily Amount: 1.5 mg., Disp: 30 Tab, Rfl: 0    ESTRACE 0.01 % (0.1 mg/gram) vaginal cream, APPLY 1GM TO VAGINAL AREA EVERY DAY FOR 2 WKS AND THEN 2-3 X WEEKLY THEREAFTER, Disp: , Rfl: 4    magnesium oxide (MAG-OX) 400 mg tablet, Take 400 mg by mouth daily. 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[employer]     Employer: [employer]   Social Needs    Financial resource strain: Not on file    Food insecurity     Worry: Not on file     Inability: Not on file   [employer] needs     Medical: Not on file     Non-medical: Not on file   Tobacco Use    Smoking status: Former Smoker     Packs/day: 0.10     Years: 10.00     Pack years: 1.00     Types: Cigarettes     Last attempt to quit: 1989     Years since quittin.3    Smokeless tobacco: Never Used    Tobacco comment: used to smoke 2-3 cigarettes a day   Substance and Sexual Activity    Alcohol use: No    Drug use: No     Types: OTC    Sexual activity: Not Currently   Lifestyle    Physical activity     Days per week: Not on file     Minutes per session: Not on file    Stress: Not on file   Relationships    Social connections     Talks on phone: Not on file     Gets together: Not on file     Attends Jainism service: Not on file     Active member of club or organization: Not on file     Attends meetings of clubs or organizations: Not on file     Relationship status: Not on file    Intimate partner violence     Fear of current or ex partner: Not on file     Emotionally abused: Not on file     Physically abused: Not on file     Forced sexual activity: Not on file   Other Topics Concern     Service Not Asked    Blood Transfusions Not Asked    Caffeine Concern Not Asked    Occupational Exposure Not Asked    Hobby Hazards Not Asked    Sleep Concern Not Asked    Stress Concern Not Asked    Weight Concern Not Asked    Special Diet Not Asked    Back Care Not Asked    Exercise No     Comment: runs regularly    Bike Helmet Not Asked   [address] [facility] Not Asked    Self-Exams Not Asked   Social History Narrative    One son, two daughters       FAMILY HISTORY:  Family History   Problem Relation Age of Onset    Thyroid Disease Father     Cancer Father         CML    Colon Polyps Father     Thyroid Disease Brother     Thyroid Cancer Brother     Heart Disease Maternal Grandfather     Heart Disease Paternal Grandfather        REVIEW OF SYSTEMS: Complete ROS assessed and noted for that which is described above, all else are negative. Eyes: normal  ENT: normal  CVS: normal  Resp: normal  GI: normal  : normal  GYN: normal  Endocrine: normal  Integument: normal  Musculoskeletal: normal  Neuro: normal  Psych: normal    PHYSICAL EXAMINATION:    VITAL SIGNS:  Telemedicine Visit    GENERAL: NCAT, Appears well nourished  EYES: EOMI, non-icteric, no proptosis  Ear/Nose/Throat: NCAT, no visible inflammation or masses  CARDIOVASCULAR: no cyanosis, no visible JVD  RESPIRATORY: comfortable respirations observed, no cyanosis  MUSCULOSKELETAL: Normal ROM of upper extremities observed  SKIN: No edema, rash, or other significant changes observed  NEUROLOGIC:  AAOx3  PSYCHIATRIC: Normal affect, Normal insight and judgement    REVIEW OF LABORATORY AND RADIOLOGY DATA:   Labs and documentation have been reviewed as described above.      ASSESSMENT AND PLAN:   Mel Peters is a 60 y.o. female with a PMHx as noted above who presents to the endocrinology clinic for the f/u evaluation of hypothyroidism. Hypothyroidism:   Levoxyl 88 mcg daily  Cytomel 5 mcg daily  Clinically and biochemically euthyroid,  Some mild hairloss reported but seems unrelated as TSH mostly unchanged,    Prediabetes:   Metformin 250mg twice daily ok for now,   Diet/exercise over the colder months important,    Thyroid nodule:   Stable on repeat ultrasound,   Next Ultrasound can be in 2022 as long as she remains asymptomatic,  Noted that can always biopsy if she desired,   Noted that we can repeat the ultrasound sooner if she preferred,    Lipids: On fenofibrate, LDL above goal but improved, again advised about statin to consider,    RTC in 6 months with prelabs, April 22 at 9:50 AM,     20 minutes spent toward telemedicine visit today of which >50% of this time was spent in counseling and coordination of care. Cindy Soriano.  [address] [facility]

## 2021-02-26 ENCOUNTER — TELEPHONE (OUTPATIENT)
Dept: ENDOCRINOLOGY | Age: 56
End: 2021-02-26

## 2021-02-26 RX ORDER — METFORMIN HYDROCHLORIDE 500 MG/1
250 TABLET ORAL 2 TIMES DAILY WITH MEALS
Qty: 90 TAB | Refills: 3 | Status: SHIPPED | OUTPATIENT
Start: 2021-02-26

## 2021-02-26 RX ORDER — LIOTHYRONINE SODIUM 5 UG/1
TABLET ORAL
Qty: 90 TAB | Refills: 3 | Status: SHIPPED | OUTPATIENT
Start: 2021-02-26

## 2021-02-26 RX ORDER — LEVOTHYROXINE SODIUM 88 UG/1
TABLET ORAL
Qty: 90 TAB | Refills: 3 | Status: SHIPPED | OUTPATIENT
Start: 2021-02-26

## 2021-02-26 NOTE — TELEPHONE ENCOUNTER
from Mark Cho sent at 2/25/2021  6:08 PM EST -----  Regarding: Dr. Stefano Foster  Medication Refill     Caller (if not patient):  Matheus Maker        Relationship of caller (if not patient):  Self        Best contact number(s):  [phone]        Name of medication and dosage if known:  Metformin 500 mg, Levoxyl 88 mcg, Liothyronine Sodium 5 mcg        Is patient out of this medication (yes/no):  only a few left         Pharmacy name:  [address] listed in chart? (yes/no):  Pharmacy phone number:  [phone]        Details to clarify the request:  Pt is requesting refill the Metformin as soon as possible, but the other medications before her April appointment.     Thanks,  Mark Cho

## 2021-02-26 NOTE — TELEPHONE ENCOUNTER
----- Message from Valerie Albarran sent at 2/25/2021  6:08 PM EST -----  Regarding: Dr. Miguel Guardado  Medication Refill    Caller (if not patient):  [facility]      Relationship of caller (if not patient):  Self      Best contact number(s):  [phone]      Name of medication and dosage if known:  Metformin 500 mg, Levoxyl 88 mcg, Liothyronine Sodium 5 mcg      Is patient out of this medication (yes/no):  only a few left       Pharmacy name:  [facility] listed in chart? (yes/no):  Pharmacy phone number:  [phone]      Details to clarify the request:  Pt is requesting refill the Metformin as soon as possible, but the other medications before her April appointment.     Thanks,  Valerie Albarran

## 2021-03-19 LAB
ALBUMIN SERPL-MCNC: 4.3 G/DL (ref 3.8–4.9)
ALBUMIN/GLOB SERPL: 1.7 {RATIO} (ref 1.2–2.2)
ALP SERPL-CCNC: 38 IU/L (ref 39–117)
ALT SERPL-CCNC: 20 IU/L (ref 0–32)
AST SERPL-CCNC: 20 IU/L (ref 0–40)
BILIRUB SERPL-MCNC: 0.4 MG/DL (ref 0–1.2)
BUN SERPL-MCNC: 16 MG/DL (ref 6–24)
BUN/CREAT SERPL: 19 (ref 9–23)
CALCIUM SERPL-MCNC: 9.3 MG/DL (ref 8.7–10.2)
CHLORIDE SERPL-SCNC: 105 MMOL/L (ref 96–106)
CHOLEST SERPL-MCNC: 183 MG/DL (ref 100–199)
CO2 SERPL-SCNC: 23 MMOL/L (ref 20–29)
CREAT SERPL-MCNC: 0.83 MG/DL (ref 0.57–1)
EST. AVERAGE GLUCOSE BLD GHB EST-MCNC: 120 MG/DL
GLOBULIN SER CALC-MCNC: 2.6 G/DL (ref 1.5–4.5)
GLUCOSE SERPL-MCNC: 95 MG/DL (ref 65–99)
HBA1C MFR BLD: 5.8 % (ref 4.8–5.6)
HDLC SERPL-MCNC: 39 MG/DL
IMP & REVIEW OF LAB RESULTS: NORMAL
LDLC SERPL CALC-MCNC: 118 MG/DL (ref 0–99)
POTASSIUM SERPL-SCNC: 4.3 MMOL/L (ref 3.5–5.2)
PROT SERPL-MCNC: 6.9 G/DL (ref 6–8.5)
SODIUM SERPL-SCNC: 139 MMOL/L (ref 134–144)
T4 FREE SERPL-MCNC: 1.33 NG/DL (ref 0.82–1.77)
TRIGL SERPL-MCNC: 144 MG/DL (ref 0–149)
TSH SERPL DL<=0.005 MIU/L-ACNC: 0.65 UIU/ML (ref 0.45–4.5)
VLDLC SERPL CALC-MCNC: 26 MG/DL (ref 5–40)

## 2021-05-19 ENCOUNTER — PATIENT MESSAGE (OUTPATIENT)
Dept: ENDOCRINOLOGY | Age: 56
End: 2021-05-19

## 2021-06-03 ENCOUNTER — HOSPITAL ENCOUNTER (OUTPATIENT)
Dept: PHYSICAL THERAPY | Age: 56
Discharge: HOME | End: 2021-06-03
Payer: COMMERCIAL

## 2021-06-03 PROCEDURE — 97140 MANUAL THERAPY 1/> REGIONS: CPT

## 2021-06-03 PROCEDURE — 97162 PT EVAL MOD COMPLEX 30 MIN: CPT

## 2021-06-03 PROCEDURE — 97110 THERAPEUTIC EXERCISES: CPT

## 2021-06-03 NOTE — PROGRESS NOTES
PT INITIAL EVALUATION NOTE - [facility] 2-15    Patient Name: Bibiana Carmichael  Date:6/3/2021  : 1965  [x]  Patient  Verified  Payor: Tripp Cavanaugh / Plan: Shade Crumbly / Product Type: HMO /    In time: 26  Out time: 143  Total Treatment Time (min): 59  Total Timed Codes (min): 25  1:1 Treatment Time ( only): 25   Visit #: 1     Treatment Area: Right knee pain [M25.561]    SUBJECTIVE  Pain Level (0-10 scale): 2 currently in R knee. The patient describes this as \"sharp\" medial knee pain. Any medication changes, allergies to medications, adverse drug reactions, diagnosis change, or new procedure performed?: [] No    [x] Yes (see summary sheet for update)  Subjective: The patient reports initial onset of current condition without SIENA, when she was sitting on her couch and noted increased R knee swelling in May 2021. She attributes back to overall decrease in activity when she contracted UTI in 2021; prior to this, she was avid runner participating in [employer], etc. At this time, she is still undergoing maintenance antibiotic regimen (Keflex) and cannot participate in fitness/running activities. The patient has been icing, elevating, performing clamshells/leg lifts, and rolling piriformis, which helped symptoms mildly but not significantly. She notes that as of this point, she has had a few weeks without swelling, however continues to experience pain along inside of her R knee, running down outer calf and up to outer hip, similar to how her L knee felt several years ago when she came to [place] [employer] [facility] for PT. She does note episodes of R knee buckling since onset of current condition, however has not fallen. She works in [employer], and notes squatting and jumping up to rotate increase her pain. Additionally, she does note she fell on her knees when trail running in , however has not had difficulty since that time.  Current functional limitations include: rapid work tasks, walking (she notes she is now walking more slowly than previously), stair navigation, and crossing R leg over L. Goals: \"Walk/hike without pain, and gain more stability in my knee\". OBJECTIVE/EXAMINATION    Posture: L LE weight shift, narrow base of support, increased lumbar lordosis, thoracic kyphosis, increased R knee flexion   Other Observations: Squat: Patient utilizes side lunge maneuver to R to reach ground   Gait and Functional Mobility: The patient ambulates with significant bilateral Trendelenberg, decreased step length, increased pronation bilaterally  Palpation: Tender to palpation at R medial tibiofemoral joint line/medial infrapatellar fat pad/patellar tendon    Hip ROM: PROM WNL bilaterally, all planes except for R hip ER = 40 degrees    R Knee AROM 0-3-139 PROM 0-2-140  L Knee AROM 0-136             PROM 2-0-143    Ankle ROM: PROM WNL bilaterally, all planes    Girth: At mid-patellar line: L = 39cm; R = 38cm    Joint Mobility Assessment: Noted hypermobility to multiplanar R patellar joint glides    Flexibility: Noted decreased bilateral hamstring/hip flexor/quadriceps flexibility as assessed via gross functional movements    LOWER QUARTER   MUSCLE STRENGTH  KEY       R  L  0 - No Contraction  Knee ext  4  4  1 - Trace            flex  4+  5  2 - Poor   Hip ext   4-  4-  3 - Fair          flex   4- (p!)  4-  4 - Good         abd  4-  4  5 - Normal         add  4  4      Ankle DF  4  4                PF  See heel raise test below                INV  4+  4                EV  4+  4    Heel Raise Test: Unable to perform single limb heel raise through full ROM bilaterally    Special Tests: Trendelenberg: (+) bilaterally      Knee Varus/Valgus Stress: (-) bilaterally      Knee Lachmans: (-) bilaterally      Thessaly Test: (+) on R    Functional Tests:  Single Limb Balance: eyes open R = ; L = 30. Eyes closed R = 9.2 seconds; L = 18.3 seconds.      *Noted increased use of ankle > hip strategy eyes closed > open*    15 min Therapeutic Exercise:  [x] See flow sheet :   Rationale: increase ROM, increase strength, improve coordination, improve balance and increase proprioception to improve the patients ability to walk, navigate stairs, and perform work tasks    10 min Manual Therapy: R knee PROM, flexion/extension to tolerance; STM/TPR R distal quadriceps/hip adductor/ITB/hamstrings/proximal gastrocnemius/soleus/popliteus   Rationale: decrease pain, increase ROM, increase tissue extensibility, decrease edema , decrease trigger points and increase postural awareness to improve the patients ability to walk, navigate stairs, and perform work tasks          With   [x] TE   [] TA   [] Neuro   [] SC   [] other: Patient Education: [x] Review HEP    [] Progressed/Changed HEP based on:   [] positioning   [] body mechanics   [] transfers   [] heat/ice application    [] other:      Other Objective/Functional Measures: FOTO Functional Measure: 55/100                Pain Level (0-10 scale) post treatment: 0. Patient noting significant improvement in overall R LE symptoms post-session today.     ASSESSMENT/Changes in Function:     [x]  See Plan of [address] Chuck Carranza, PT, DPT 6/3/2021

## 2021-06-04 NOTE — PROGRESS NOTES
[facility]  [address]  [place]  Phone: [phone] Fax: [phone]    Plan of Care/Statement of Necessity for Physical Therapy Services  2-15    Patient name: Matthew Andrew  : 1965  Provider#: [id number]  Referral source: Princess Philippe MD      Medical/Treatment Diagnosis: Right knee pain [M25.561]     Prior Hospitalization: see medical history     Comorbidities: Hypothyroid, chronic pain  Prior Level of Function: Independent, active  Medications: Verified on Patient Summary List    Start of Care: 21      Onset Date: May 2021       The [address] [place] [address] and following information is based on the information from the initial evaluation. Assessment/ key information:     The patient is a 60year old female who presents to physical therapy services due to acute-onset R knee pain. She demonstrates signs and symptoms consistent with potential R medial meniscus tear, including (+) R Thessaly test, medial tibiofemoral joint line tenderness, mildly decreased R knee extension ROM (AROM 0-3-139; PROM 0-2-140), decreased multiplanar bilateral LE strength with inability to perform single limb heel raise through full ROM bilaterally, decreased single limb postural control with eyes closed (R = 9.2 seconds; L = 18.3 seconds), and aberrant movement patterns with squatting and ambulation. She would benefit from continued skilled physical therapy services to improve symptom management, functional endurance and independence toward return to prior level of function.     Evaluation Complexity History MEDIUM  Complexity : 1-2 comorbidities / personal factors will impact the outcome/ POC ; Examination HIGH Complexity : 4+ Standardized tests and measures addressing body structure, function, activity limitation and / or participation in recreation  ;Presentation MEDIUM Complexity : Evolving with changing characteristics ;Clinical Decision Making MEDIUM Complexity : FOTO score of 26-74  Overall Complexity Rating: MEDIUM    Problem List: pain affecting function, decrease ROM, decrease strength, edema affecting function, impaired gait/ balance, decrease ADL/ functional abilitiies, decrease activity tolerance, decrease flexibility/ joint mobility and decrease transfer abilities   Treatment Plan may include any combination of the following: Therapeutic exercise, Therapeutic activities, Neuromuscular re-education, Physical agent/modality, Gait/balance training, Manual therapy, Patient education, Self Care training, Functional mobility training, Home safety training, Stair training and Other: Dry Needling  Patient / Family readiness to learn indicated by: asking questions, trying to perform skills and interest  Persons(s) to be included in education: patient (P)  Barriers to Learning/Limitations: None  Patient Goal (s): Walk/hike without pain, and gain more stability in my knee.   Patient Self Reported Health Status: good  Rehabilitation Potential: good    Short Term Goals: To be accomplished in 6-8 treatments: The patient will demonstrate understanding of and compliance with updated and progressive HEP toward improved participation in physical therapy plan of care. The patient will demonstrate R knee extension A/PROM to >= neutral toward improved gait mechanics. Long Term Goals: To be accomplished in 12-16 treatments: The patient will demonstrate multiplanar bilateral LE strength increased >= 1/2 MMT grade toward improved endurance with functional tasks such as stair navigation and fitness activities. The patient will report completing her work day without onset of familiar R knee pain 4/5 days over past week to demonstrate improved functional tolerance to work tasks. The patient will score >= 70 on FOTO to demonstrate significantly improved subjective report of function.   Frequency / Duration: Patient to be seen 2 times per week for 12-16 treatments. Patient/ Caregiver education and instruction: self care, activity modification and exercises    [x]  Plan of care has been reviewed with TEVIN Giles PT, DPT 6/4/2021     ________________________________________________________________________    I certify that the above Therapy Services are being furnished while the patient is under my care. I agree with the treatment plan and certify that this therapy is necessary.     Physician's Signature:____________________  Date:____________Time: _________      Nupur Liao MD

## 2021-06-10 ENCOUNTER — HOSPITAL ENCOUNTER (OUTPATIENT)
Dept: PHYSICAL THERAPY | Age: 56
Discharge: HOME | End: 2021-06-10
Payer: COMMERCIAL

## 2021-06-10 PROCEDURE — 97110 THERAPEUTIC EXERCISES: CPT

## 2021-06-10 PROCEDURE — 97140 MANUAL THERAPY 1/> REGIONS: CPT

## 2021-06-10 NOTE — PROGRESS NOTES
PT DAILY TREATMENT NOTE - [facility] 2-15    Patient Name: Rock Mccabe  Date:6/10/2021  : 1965  [x]  Patient  Verified  Payor: Checo Curry / Plan: Nayely Bergeron / Product Type: HMO /    In time:258  Out time:352  Total Treatment Time (min): 54  Total Timed Codes (min): 54  1:1 Treatment Time ( only): 47   Visit #:  2    Treatment Area: Right knee pain [M25.561]    SUBJECTIVE  Pain Level (0-10 scale): 4/10  Any medication changes, allergies to medications, adverse drug reactions, diagnosis change, or new procedure performed?: [x] No    [] Yes (see summary sheet for update)  Subjective functional status/changes:   [] No changes reported  Patient reports her knee continues to swell and be painful. OBJECTIVE    44 min Therapeutic Exercise:  [x]? See flow sheet :   Rationale: increase ROM, increase strength, improve coordination, improve balance and increase proprioception to improve the patients ability to walk, navigate stairs, and perform work tasks     10 min Manual Therapy: R knee PROM, flexion/extension to tolerance; STM/TPR R distal quadriceps/hip adductor/ITB/hamstrings/proximal gastrocnemius/soleus/popliteus   Rationale: decrease pain, increase ROM, increase tissue extensibility, decrease edema , decrease trigger points and increase postural awareness to improve the patients ability to walk, navigate stairs, and perform work tasks                                                                      With   [] TE   [] TA   [] Neuro   [] SC   [] other: Patient Education: [x] Review HEP    [] Progressed/Changed HEP based on:   [] positioning   [] body mechanics   [] transfers   [] heat/ice application    [] other:      Other Objective/Functional Measures: none noted     Pain Level (0-10 scale) post treatment: 0/10    ASSESSMENT/Changes in Function:   Patient tolerated all therex well, will continue to progress as tolerated.   Patient will continue to benefit from skilled PT services to modify and progress therapeutic interventions, address functional mobility deficits, address ROM deficits, address strength deficits, analyze and address soft tissue restrictions, analyze and cue movement patterns, analyze and modify body mechanics/ergonomics and assess and modify postural abnormalities to attain remaining goals. [x]  See Plan of Care  []  See progress note/recertification  []  See Discharge Summary         Progress towards goals / Updated goals:  Patient is progressing towards goals.      PLAN  [x]  Upgrade activities as tolerated     [x]  Continue plan of care  [x]  Update interventions per flow sheet       []  Discharge due to:_  []  Other:_      Mina Barnett, TEVIN 6/10/2021

## 2021-06-17 ENCOUNTER — HOSPITAL ENCOUNTER (OUTPATIENT)
Dept: PHYSICAL THERAPY | Age: 56
Discharge: HOME | End: 2021-06-17
Payer: COMMERCIAL

## 2021-06-17 PROCEDURE — 97140 MANUAL THERAPY 1/> REGIONS: CPT

## 2021-06-17 PROCEDURE — 97110 THERAPEUTIC EXERCISES: CPT

## 2021-06-17 NOTE — PROGRESS NOTES
PT DAILY TREATMENT NOTE - [facility] 2-15    Patient Name: Sylvester Murphy  Date:2021  : 1965  [x]  Patient  Verified  Payor: Mirza Cardenas / Plan: Marylen Hones / Product Type: HMO /    In time: 1034  Out time: 1120  Total Treatment Time (min): 46  Total Timed Codes (min): 46  1:1 Treatment Time ( only): 55   Visit #:  3    *Patient requesting to leave by 11:15-20 today due to other commitments*    Treatment Area: Right knee pain [M25.561]    SUBJECTIVE  Pain Level (0-10 scale): 2 in R knee  Any medication changes, allergies to medications, adverse drug reactions, diagnosis change, or new procedure performed?: [x] No    [] Yes (see summary sheet for update)  Subjective functional status/changes:   [] No changes reported    The patient reports decreased incidence of swelling since last visit; she hiked 3 miles last weekend which felt good. She notes stairs have become easier, and she is walking more quickly than previously. She stopped doing the hip abduction exercise at the wall due to increased \"crunching\" in her knee, which bothered her. OBJECTIVE    28 min Therapeutic Exercise:  [x]?  See flow sheet :   Rationale: increase ROM, increase strength, improve coordination, improve balance and increase proprioception to improve the patients ability to walk, navigate stairs, and perform work tasks     18 min Manual Therapy: R knee PROM, flexion/extension to tolerance; R knee flexion + tibiofemoral IR/extension + tibiofemoral ER; STM/TPR R distal quadriceps/hip adductor/ITB/hamstrings/proximal gastrocnemius/soleus/popliteus   Rationale: decrease pain, increase ROM, increase tissue extensibility, decrease edema , decrease trigger points and increase postural awareness to improve the patients ability to walk, navigate stairs, and perform work tasks                                                                      With   [x] TE   [] TA   [] Neuro   [] SC   [] other: Patient Education: [x] Review HEP [] Progressed/Changed HEP based on:   [] positioning   [] body mechanics   [] transfers   [] heat/ice application    [] other:      Other Objective/Functional Measures: R knee AROM = 0-0-140 post-manual intervention     Pain Level (0-10 scale) post treatment: 0/10    ASSESSMENT/Changes in Function:   The patient tolerated therapy visit well at this date. She demonstrates continued soft tissue limitations particularly along R inferomedial and posterior knee, improved significantly with manual intervention. Patient demonstrating full R knee extension AROM post-manual. Patient demonstrates improved gluteal recruitment with bridges and clamshells, requiring tactile cue with hand at hip to maintain hip stacking during clamshells on R. Patient fatiguing rapidly with incorporation of bridge/clamshell holds for endurance bias. She demonstrates decreased eccentric control during single limb eccentric heel raises R > L, relies on significant UE support to control lowering phase. Noted increased bilateral femoral internal rotation/knee valgus bilaterally with sidestepping/monster walks, improved with cues to \"press out into band\". Patient fatigued, noting abolishment of familiar symptoms post-session today. Continue to progress as tolerated. Patient will continue to benefit from skilled PT services to modify and progress therapeutic interventions, address functional mobility deficits, address ROM deficits, address strength deficits, analyze and address soft tissue restrictions, analyze and cue movement patterns, analyze and modify body mechanics/ergonomics and assess and modify postural abnormalities to attain remaining goals. [x]  See Plan of Care  []  See progress note/recertification  []  See Discharge Summary         Progress towards goals / Updated goals:    Short Term Goals: To be accomplished in 6-8 treatments:                The patient will demonstrate understanding of and compliance with updated and progressive HEP toward improved participation in physical therapy plan of care. - Progressing              The patient will demonstrate R knee extension A/PROM to >= neutral toward improved gait mechanics. - Progressing  Long Term Goals: To be accomplished in 12-16 treatments: The patient will demonstrate multiplanar bilateral LE strength increased >= 1/2 MMT grade toward improved endurance with functional tasks such as stair navigation and fitness activities. - Progressing              The patient will report completing her work day without onset of familiar R knee pain 4/5 days over past week to demonstrate improved functional tolerance to work tasks. - Progressing              The patient will score >= 70 on FOTO to demonstrate significantly improved subjective report of function. - Progressing  Frequency / Duration: Patient to be seen 2 times per week for 12-16 treatments.     PLAN  [x]  Upgrade activities as tolerated     [x]  Continue plan of care  [x]  Update interventions per flow sheet       []  Discharge due to:_  []  Other:_      Yesica Maldonado, PT, DPT 6/17/2021

## 2021-06-24 ENCOUNTER — HOSPITAL ENCOUNTER (OUTPATIENT)
Dept: PHYSICAL THERAPY | Age: 56
Discharge: HOME | End: 2021-06-24
Payer: COMMERCIAL

## 2021-06-24 PROCEDURE — 97140 MANUAL THERAPY 1/> REGIONS: CPT

## 2021-06-24 PROCEDURE — 97110 THERAPEUTIC EXERCISES: CPT

## 2021-06-24 NOTE — PROGRESS NOTES
PT DAILY TREATMENT NOTE - [facility] 2-15    Patient Name: Bailey Zazueta  Date:2021  : 1965  [x]  Patient  Verified  Payor: Mary Sullivan / Plan: Leticia Cordero / Product Type: HMO /    In time:300  Out time:401  Total Treatment Time (min): 61  Total Timed Codes (min): 56  1:1 Treatment Time ( [address] only): 64   Visit #:  4    Treatment Area: Right knee pain [M25.561]    SUBJECTIVE  Pain Level (0-10 scale): 0/10  Any medication changes, allergies to medications, adverse drug reactions, diagnosis change, or new procedure performed?: [x] No    [] Yes (see summary sheet for update)  Subjective functional status/changes:   [] No changes reported  Patient reports she feels like her knee is getting a bit better, but it will still buckle at random moments. She went hiking this past Friday and caused her knee to hurt while going up, but     OBJECTIVE    51 min Therapeutic Exercise:  [x]?  See flow sheet :   Rationale: increase ROM, increase strength, improve coordination, improve balance and increase proprioception to improve the patients ability to walk, navigate stairs, and perform work tasks     10 min Manual Therapy: R knee PROM, flexion/extension to tolerance; STM/TPR R distal quadriceps/hip adductor/ITB/hamstrings/proximal gastrocnemius/soleus/popliteus   Rationale: decrease pain, increase ROM, increase tissue extensibility, decrease edema , decrease trigger points and increase postural awareness to improve the patients ability to walk, navigate stairs, and perform work tasks                                                                      With   [] TE   [] TA   [] Neuro   [] SC   [] other: Patient Education: [x] Review HEP    [] Progressed/Changed HEP based on:   [] positioning   [] body mechanics   [] transfers   [] heat/ice application    [] other:      Other Objective/Functional Measures: none noted     Pain Level (0-10 scale) post treatment: 0/10    ASSESSMENT/Changes in Function:   Patient tolerated all therex well, will continue to progress as tolerated. Patient will continue to benefit from skilled PT services to modify and progress therapeutic interventions, address functional mobility deficits, address ROM deficits, address strength deficits, analyze and address soft tissue restrictions, analyze and cue movement patterns, analyze and modify body mechanics/ergonomics and assess and modify postural abnormalities to attain remaining goals. [x]  See Plan of Care  []  See progress note/recertification  []  See Discharge Summary         Progress towards goals / Updated goals:  Patient is progressing towards goals.      PLAN  [x]  Upgrade activities as tolerated     [x]  Continue plan of care  [x]  Update interventions per flow sheet       []  Discharge due to:_  []  Other:_      Jacob Dalal, PTA 6/24/2021

## 2021-06-29 ENCOUNTER — APPOINTMENT (OUTPATIENT)
Dept: PHYSICAL THERAPY | Age: 56
End: 2021-06-29
Payer: COMMERCIAL

## 2021-07-01 ENCOUNTER — HOSPITAL ENCOUNTER (OUTPATIENT)
Dept: PHYSICAL THERAPY | Age: 56
Discharge: HOME | End: 2021-07-01
Payer: COMMERCIAL

## 2021-07-01 PROCEDURE — 97110 THERAPEUTIC EXERCISES: CPT

## 2021-07-01 NOTE — PROGRESS NOTES
PT DAILY TREATMENT NOTE - [facility] 2-15    Patient Name: Brett Tomas  Date:2021  : 1965  [x]  Patient  Verified  Payor: Josephine Aranda / Plan: Maxwell Herminio / Product Type: HMO /    In time: 6986  Out time:   Total Treatment Time (min): 40 (patient required restroom break from 5:36pm-5:38pm)  Total Timed Codes (min): 40  1:1 Treatment Time ( [address] only): 40   Visit #:  5    Treatment Area: Right knee pain [M25.561]    SUBJECTIVE  Pain Level (0-10 scale): 0  Any medication changes, allergies to medications, adverse drug reactions, diagnosis change, or new procedure performed?: [x] No    [] Yes (see summary sheet for update)  Subjective functional status/changes:   [] No changes reported    The patient reports she went to [address] to golf today without issue; her knees felt fine. She feels she is 95% back to where she was previously. She would like to start running again, and has had minimal issues regarding knees since last visit. She would like to make today her final physical therapy visit.     OBJECTIVE     Gait and Functional Mobility: The patient ambulates with significant bilateral Trendelenberg, decreased step length, increased pronation bilaterally  Palpation: No noted tenderness to palpation at R medial tibiofemoral joint line/medial infrapatellar fat pad/patellar tendon     Hip ROM: PROM WNL bilaterally, all planes except for R hip ER = 51 degrees     R Knee AROM 0-2-144          PROM 0-147  L Knee AROM 0-141              PROM 2-0-142     LOWER QUARTER                            MUSCLE STRENGTH  KEY                                                                             R                      L  0 - No Contraction                   Knee ext                      4+                    4+  1 - Trace                                           flex                     5                      5  2 - Poor                                   Hip ext                         4                     4  3 - Fair                                           flex                        5                      4+  4 - Good                                        abd                        5                     4+  5 - Normal                                     add                        4+                     4                                                  Ankle DF                     4+                    4+                                                            PF                     See heel raise test below                                                            INV                    4+                    4+                                                            EV                     4+                    4+     Heel Raise Test: R = 20 repetitions; L = Unable to perform single limb heel raise through full ROM     Functional Tests:  Single Limb Balance: eyes open >= 30 seconds bilaterally. Eyes closed R = 20.9 seconds; L = 27.1 seconds. *Noted increased use of ankle > hip strategy eyes closed > open*    40 min Therapeutic Exercise:  [x]? See flow sheet : Includes time spent on re-assessment tests and measures   Rationale: increase ROM, increase strength, improve coordination, improve balance and increase proprioception to improve the patients ability to walk, navigate stairs, and perform work tasks                                                                      With   [x] TE   [] TA   [] Neuro   [] SC   [] other: Patient Education: [x] Review HEP    [] Progressed/Changed HEP based on:   [] positioning   [] body mechanics   [] transfers   [] heat/ice application    [] other:      Other Objective/Functional Measures: FOTO = 75     Pain Level (0-10 scale) post treatment: 0/10    ASSESSMENT/Changes in Function:   The patient is a 60year old female who has participated in 5 skilled physical therapy visits due to acute-onset R knee pain.  She demonstrates increased R knee ROM (AROM 0-2-144; PROM 0-147), increased multiplanar bilateral LE strength with ability to perform 20 R single limb heel raises, and improved single limb postural control with eyes closed (R = 20.9 seconds; L = 27.1 seconds). She scored 75 on FOTO, demonstrating significantly improved subjective report of function since initial evaluation. She has met or partially met 5/5 physical therapy goals at this date. Due to demonstrated subjective and objective progress, 5/5 goals met, and demonstrated independence with progressive and updated HEP, as well as patient request, the patient no longer requires skilled physical therapy services and is discharged to independent fitness program with discussion of appropriate return to running principles at this time. []  See Plan of Care  []  See progress note/recertification  [x]  See Discharge Summary         Progress towards goals / Updated goals:    Short Term Goals: To be accomplished in 6-8 treatments:               IXO patient will demonstrate understanding of and compliance with updated and progressive HEP toward improved participation in physical therapy plan of care. - Met              The patient will demonstrate R knee extension A/PROM to >= neutral toward improved gait mechanics. - Partially Met for R knee extension PROM  Long Term Goals: To be accomplished in 12-16 treatments:               HUM patient will demonstrate multiplanar bilateral LE strength increased >= 1/2 MMT grade toward improved endurance with functional tasks such as stair navigation and fitness activities. - Met              The patient will report completing her work day without onset of familiar R knee pain 4/5 days over past week to demonstrate improved functional tolerance to work tasks.  - Met              The patient will score >= 70 on FOTO to demonstrate significantly improved subjective report of function. - Met    PLAN  []  Upgrade activities as tolerated     []  Continue plan of care  []  Update interventions per flow sheet       [x]  Discharge due to: Patient has met all physical therapy goals at this time  []  Other:_      John Lamas, PT, DPT 7/1/2021

## 2021-07-02 NOTE — ANCILLARY DISCHARGE INSTRUCTIONS
[facility]  [address] ([facility] [address] [place]  Phone: [phone] Fax: [phone]    Discharge Summary  2-15    Patient name: Vicki Jang  : 1965  Provider#: [id number]  Referral source: Jaime Gutierrez MD      Medical/Treatment Diagnosis: Right knee pain [M25.561]     Prior Hospitalization: see medical history     Comorbidities: See Plan of Care  Prior Level of Function:See Plan of Care  Medications: Verified on Patient Summary List    Start of Care: 21      Onset Date: May 2021   Visits from Start of Care: 5     Missed Visits: 1  Reporting Period : 21 to 21      ASSESSMENT/SUMMARY OF CARE: The patient is a 60year old female who has participated in 5 skilled physical therapy visits due to acute-onset R knee pain. She demonstrates increased R knee ROM (AROM 0-2-144; PROM 0-147), increased multiplanar bilateral LE strength with ability to perform 20 R single limb heel raises, and improved single limb postural control with eyes closed (R = 20.9 seconds; L = 27.1 seconds). She scored 75 on FOTO, demonstrating significantly improved subjective report of function since initial evaluation. She has met or partially met 5/5 physical therapy goals at this date. Due to demonstrated subjective and objective progress, 5/5 goals met, and demonstrated independence with progressive and updated HEP, as well as patient request, the patient no longer requires skilled physical therapy services and is discharged to independent fitness program with discussion of appropriate return to running principles at this time.     Short Term Goals: To be accomplished in 6-8 treatments:               YASHIRA patient will demonstrate understanding of and compliance with updated and progressive HEP toward improved participation in physical therapy plan of care. - Met              The patient will demonstrate R knee extension A/PROM to >= neutral toward improved gait mechanics. - Partially Met for R knee extension PROM  Long Term Goals: To be accomplished in 12-16 treatments:               ESK patient will demonstrate multiplanar bilateral LE strength increased >= 1/2 MMT grade toward improved endurance with functional tasks such as stair navigation and fitness activities. - Met              The patient will report completing her work day without onset of familiar R knee pain 4/5 days over past week to demonstrate improved functional tolerance to work tasks. - Met              The patient will score >= 70 on FOTO to demonstrate significantly improved subjective report of function. - Met     RECOMMENDATIONS:  [x]Discontinue therapy: [x]Patient has reached or is progressing toward set goals      []Patient is non-compliant or has abdicated      []Due to lack of appreciable progress towards set goals      []Other    Damari Cos, PT, DPT 7/2/2021

## 2021-07-12 ENCOUNTER — APPOINTMENT (OUTPATIENT)
Dept: CT IMAGING | Age: 56
End: 2021-07-12
Attending: EMERGENCY MEDICINE
Payer: COMMERCIAL

## 2021-07-12 ENCOUNTER — HOSPITAL ENCOUNTER (EMERGENCY)
Age: 56
Discharge: OTHER INSTITUTION | End: 2021-07-12
Attending: EMERGENCY MEDICINE | Admitting: FAMILY MEDICINE
Payer: COMMERCIAL

## 2021-07-12 ENCOUNTER — HOSPITAL ENCOUNTER (EMERGENCY)
Dept: MRI IMAGING | Age: 56
Discharge: HOME | End: 2021-07-12
Attending: EMERGENCY MEDICINE
Payer: COMMERCIAL

## 2021-07-12 VITALS
DIASTOLIC BLOOD PRESSURE: 61 MMHG | RESPIRATION RATE: 16 BRPM | HEART RATE: 62 BPM | SYSTOLIC BLOOD PRESSURE: 115 MMHG | HEIGHT: 69 IN | BODY MASS INDEX: 24.59 KG/M2 | TEMPERATURE: 98.3 F | WEIGHT: 166.01 LBS | OXYGEN SATURATION: 98 %

## 2021-07-12 VITALS — WEIGHT: 166 LBS | BODY MASS INDEX: 24.51 KG/M2

## 2021-07-12 DIAGNOSIS — K80.50 CHOLEDOCHOLITHIASIS: ICD-10-CM

## 2021-07-12 DIAGNOSIS — R10.12 ABDOMINAL PAIN, LUQ (LEFT UPPER QUADRANT): Primary | ICD-10-CM

## 2021-07-12 PROBLEM — R10.9 ABDOMINAL PAIN: Status: ACTIVE | Noted: 2021-07-12

## 2021-07-12 LAB
ALBUMIN SERPL-MCNC: 4 G/DL (ref 3.5–5)
ALBUMIN/GLOB SERPL: 1.2 {RATIO} (ref 1.1–2.2)
ALP SERPL-CCNC: 41 U/L (ref 45–117)
ALT SERPL-CCNC: 33 U/L (ref 12–78)
AMORPH CRY URNS QL MICRO: ABNORMAL
ANION GAP SERPL CALC-SCNC: 10 MMOL/L (ref 5–15)
APPEARANCE UR: ABNORMAL
AST SERPL-CCNC: 20 U/L (ref 15–37)
BACTERIA URNS QL MICRO: ABNORMAL /HPF
BASOPHILS # BLD: 0 K/UL (ref 0–0.1)
BASOPHILS NFR BLD: 1 % (ref 0–1)
BILIRUB SERPL-MCNC: 0.4 MG/DL (ref 0.2–1)
BILIRUB UR QL: NEGATIVE
BUN SERPL-MCNC: 27 MG/DL (ref 6–20)
BUN/CREAT SERPL: 28 (ref 12–20)
CALCIUM SERPL-MCNC: 9.9 MG/DL (ref 8.5–10.1)
CHLORIDE SERPL-SCNC: 104 MMOL/L (ref 97–108)
CO2 SERPL-SCNC: 25 MMOL/L (ref 21–32)
COLOR UR: ABNORMAL
COMMENT, HOLDF: NORMAL
CREAT SERPL-MCNC: 0.97 MG/DL (ref 0.55–1.02)
DIFFERENTIAL METHOD BLD: ABNORMAL
EOSINOPHIL # BLD: 0.1 K/UL (ref 0–0.4)
EOSINOPHIL NFR BLD: 1 % (ref 0–7)
EPITH CASTS URNS QL MICRO: ABNORMAL /LPF
ERYTHROCYTE [DISTWIDTH] IN BLOOD BY AUTOMATED COUNT: 13.2 % (ref 11.5–14.5)
GLOBULIN SER CALC-MCNC: 3.4 G/DL (ref 2–4)
GLUCOSE SERPL-MCNC: 116 MG/DL (ref 65–100)
GLUCOSE UR STRIP.AUTO-MCNC: NEGATIVE MG/DL
HCT VFR BLD AUTO: 37.6 % (ref 35–47)
HGB BLD-MCNC: 12.3 G/DL (ref 11.5–16)
HGB UR QL STRIP: NEGATIVE
IMM GRANULOCYTES # BLD AUTO: 0 K/UL (ref 0–0.04)
IMM GRANULOCYTES NFR BLD AUTO: 1 % (ref 0–0.5)
KETONES UR QL STRIP.AUTO: NEGATIVE MG/DL
LEUKOCYTE ESTERASE UR QL STRIP.AUTO: NEGATIVE
LIPASE SERPL-CCNC: 173 U/L (ref 73–393)
LYMPHOCYTES # BLD: 1.6 K/UL (ref 0.8–3.5)
LYMPHOCYTES NFR BLD: 27 % (ref 12–49)
MCH RBC QN AUTO: 27.4 PG (ref 26–34)
MCHC RBC AUTO-ENTMCNC: 32.7 G/DL (ref 30–36.5)
MCV RBC AUTO: 83.7 FL (ref 80–99)
MONOCYTES # BLD: 0.6 K/UL (ref 0–1)
MONOCYTES NFR BLD: 9 % (ref 5–13)
NEUTS SEG # BLD: 3.8 K/UL (ref 1.8–8)
NEUTS SEG NFR BLD: 61 % (ref 32–75)
NITRITE UR QL STRIP.AUTO: NEGATIVE
NRBC # BLD: 0 K/UL (ref 0–0.01)
NRBC BLD-RTO: 0 PER 100 WBC
PH UR STRIP: 8 [PH] (ref 5–8)
PLATELET # BLD AUTO: 318 K/UL (ref 150–400)
PMV BLD AUTO: 10.1 FL (ref 8.9–12.9)
POTASSIUM SERPL-SCNC: 3.5 MMOL/L (ref 3.5–5.1)
PROT SERPL-MCNC: 7.4 G/DL (ref 6.4–8.2)
PROT UR STRIP-MCNC: NEGATIVE MG/DL
RBC # BLD AUTO: 4.49 M/UL (ref 3.8–5.2)
RBC #/AREA URNS HPF: ABNORMAL /HPF (ref 0–5)
SAMPLES BEING HELD,HOLD: NORMAL
SODIUM SERPL-SCNC: 139 MMOL/L (ref 136–145)
SP GR UR REFRACTOMETRY: 1.01 (ref 1–1.03)
TROPONIN I SERPL-MCNC: <0.05 NG/ML
UR CULT HOLD, URHOLD: NORMAL
UROBILINOGEN UR QL STRIP.AUTO: 0.2 EU/DL (ref 0.2–1)
WBC # BLD AUTO: 6.1 K/UL (ref 3.6–11)
WBC URNS QL MICRO: ABNORMAL /HPF (ref 0–4)

## 2021-07-12 PROCEDURE — 74183 MRI ABD W/O CNTR FLWD CNTR: CPT

## 2021-07-12 PROCEDURE — A9585 GADOBUTROL INJECTION: HCPCS | Performed by: EMERGENCY MEDICINE

## 2021-07-12 PROCEDURE — 93005 ELECTROCARDIOGRAM TRACING: CPT

## 2021-07-12 PROCEDURE — 99285 EMERGENCY DEPT VISIT HI MDM: CPT

## 2021-07-12 PROCEDURE — 85025 COMPLETE CBC W/AUTO DIFF WBC: CPT

## 2021-07-12 PROCEDURE — 74011250636 HC RX REV CODE- 250/636: Performed by: EMERGENCY MEDICINE

## 2021-07-12 PROCEDURE — 84484 ASSAY OF TROPONIN QUANT: CPT

## 2021-07-12 PROCEDURE — 65270000029 HC RM PRIVATE

## 2021-07-12 PROCEDURE — 80053 COMPREHEN METABOLIC PANEL: CPT

## 2021-07-12 PROCEDURE — 74177 CT ABD & PELVIS W/CONTRAST: CPT

## 2021-07-12 PROCEDURE — 81001 URINALYSIS AUTO W/SCOPE: CPT

## 2021-07-12 PROCEDURE — 96374 THER/PROPH/DIAG INJ IV PUSH: CPT

## 2021-07-12 PROCEDURE — 83690 ASSAY OF LIPASE: CPT

## 2021-07-12 PROCEDURE — 74011000636 HC RX REV CODE- 636: Performed by: EMERGENCY MEDICINE

## 2021-07-12 PROCEDURE — 96375 TX/PRO/DX INJ NEW DRUG ADDON: CPT

## 2021-07-12 RX ORDER — ONDANSETRON 2 MG/ML
4 INJECTION INTRAMUSCULAR; INTRAVENOUS
Status: COMPLETED | OUTPATIENT
Start: 2021-07-12 | End: 2021-07-12

## 2021-07-12 RX ORDER — ESTRADIOL 0.03 MG/D
FILM, EXTENDED RELEASE TRANSDERMAL
COMMUNITY
Start: 2021-06-04

## 2021-07-12 RX ORDER — KETOROLAC TROMETHAMINE 30 MG/ML
15 INJECTION, SOLUTION INTRAMUSCULAR; INTRAVENOUS ONCE
Status: COMPLETED | OUTPATIENT
Start: 2021-07-12 | End: 2021-07-12

## 2021-07-12 RX ORDER — MORPHINE SULFATE 4 MG/ML
4 INJECTION INTRAVENOUS
Status: DISCONTINUED | OUTPATIENT
Start: 2021-07-12 | End: 2021-07-12 | Stop reason: HOSPADM

## 2021-07-12 RX ORDER — CEPHALEXIN 250 MG/1
250 CAPSULE ORAL 4 TIMES DAILY
COMMUNITY

## 2021-07-12 RX ORDER — HYDROXYZINE 25 MG/1
TABLET, FILM COATED ORAL
COMMUNITY
Start: 2021-05-02

## 2021-07-12 RX ADMIN — KETOROLAC TROMETHAMINE 15 MG: 30 INJECTION, SOLUTION INTRAMUSCULAR; INTRAVENOUS at 13:10

## 2021-07-12 RX ADMIN — IOPAMIDOL 100 ML: 755 INJECTION, SOLUTION INTRAVENOUS at 12:03

## 2021-07-12 RX ADMIN — ONDANSETRON 4 MG: 2 INJECTION INTRAMUSCULAR; INTRAVENOUS at 11:46

## 2021-07-12 RX ADMIN — SODIUM CHLORIDE 1000 ML: 9 INJECTION, SOLUTION INTRAVENOUS at 11:46

## 2021-07-12 RX ADMIN — GADOBUTROL 7.5 ML: 604.72 INJECTION INTRAVENOUS at 14:53

## 2021-07-12 NOTE — ED NOTES
Patient arrives ambulatory to ed with complaints of LUQ pain since waking up this morning. Denies N/V/D. Patient currently taking a maintenance dose for UTI since February. Patient also states chills and feeling clammy. Pain worse with movement.

## 2021-07-12 NOTE — ED NOTES
Per transfer center, [address] [place] [address] is on diversion and not accepting transfers at this time. Patient agreeable to go to [facility]. Transfer center updated at this time.

## 2021-07-12 NOTE — ED NOTES
TRANSFER - OUT REPORT:    Verbal report given to Preston Schaeffer RN(name) on Corinne Savage  being transferred to [facility]) for routine progression of care       Report consisted of patients Situation, Background, Assessment and   Recommendations(SBAR). Information from the following report(s) SBAR was reviewed with the receiving nurse. Lines:   Peripheral IV 07/12/21 Right Antecubital (Active)   Site Assessment Clean, dry, & intact 07/12/21 1045   Dressing Status Clean, dry, & intact 07/12/21 1045        Opportunity for questions and clarification was provided. Patient transported [place] [id number] with .

## 2021-07-12 NOTE — ED NOTES
Pt transferred in stable condition at this time. Patient ambulatory to exit with spouse. Spouse to drive POV to [address] at this time. Emtala, face sheet, and ed encounter sent with patient and faxed to [facility] at this time.

## 2021-07-12 NOTE — ED PROVIDER NOTES
73-year-old female with a history of diabetes, nephrolithiasis, and thyroid disease was in her normal state of health when she woke up this morning, but around 9:00 she had an abrupt onset of left upper quadrant abdominal pain that was severe and is unrelenting. It does not go into her chest or her back. No nausea or vomiting or diarrhea. She had a normal bowel movement just a little while ago with no blood in it. No fever or cough or shortness of breath. Does not feel similar to prior kidney stones. She has had frequent UTIs and is taking Keflex daily for prophylaxis, but has no new urinary symptoms or dysuria. No leg weakness or numbness or tingling or pain.            Past Medical History:   Diagnosis Date    Adverse effect of anesthesia     anxiety with waking up    Asthma     hx of asthma r/t allergies/emotional response    Diabetes ([facility] [address]     pre diabetic/ no meds    High triglycerides     Hx of mammogram 10/2/12    Ill-defined condition     low blood pressure    Nausea & vomiting     Nephrolithiasis     right kidney stone    Other ill-defined conditions(799.89)     echocardiogram,12/2010,  has murmur    Thyroid disease     enlarged & inflamed thryoid/ hypo        Past Surgical History:   Procedure Laterality Date    COLONOSCOPY N/A 2/26/2018    COLONOSCOPY performed by Jeff Vogt [facility] at [facility]  2/26/2018         HX BUNIONECTOMY  2001    ,right bunion    HX HERNIA REPAIR      umbilical    HX HYSTERECTOMY  2007    laparoscopic hysterecomy, rectocele repair, umbilical hernia repair, sling    HX ORTHOPAEDIC Right 2001    foot-bunion with hardware    HX RECTOCELE REPAIR  2007    HX TONSILLECTOMY  age 8   [de-identified] UROLOGICAL  2007    urethral sling    HX UROLOGICAL  2020    sling release    ID ABDOMEN SURGERY PROC UNLISTED      laparoscopy to check for endometriosis         Family History:   Problem Relation Age of Onset    Thyroid Disease Father     Cancer Father         CML    Colon Polyps Father     Thyroid Disease Brother     Thyroid Cancer Brother     Heart Disease Maternal Grandfather     Heart Disease Paternal Grandfather        Social History     Socioeconomic History    Marital status:      Spouse name:     Number of children: 3    Years of education: Not on file    Highest education level: Not on file   Occupational History    Occupation: MammographerPete     Employer: [employer]   Tobacco Use    Smoking status: Former Smoker     Packs/day: 0.10     Years: 10.00     Pack years: 1.00     Types: Cigarettes     Quit date: 1989     Years since quittin.0    Smokeless tobacco: Never Used    Tobacco comment: used to smoke 2-3 cigarettes a day   Substance and Sexual Activity    Alcohol use: No    Drug use: No     Types: OTC    Sexual activity: Not Currently   Other Topics Concern     Service Not Asked    Blood Transfusions Not Asked    Caffeine Concern Not Asked    Occupational Exposure Not Asked    Hobby Hazards Not Asked    Sleep Concern Not Asked    Stress Concern Not Asked    Weight Concern Not Asked    Special Diet Not Asked    Back Care Not Asked    Exercise No     Comment: runs regularly    Bike Helmet Not Asked    Seat Belt Not Asked    Self-Exams Not Asked   Social History Narrative    One son, two daughters     Social Determinants of Health     Financial Resource Strain:     Difficulty of Paying Living Expenses:    Food Insecurity:     Worried About Running Out of Food in the Last Year:     Ran Out of Food in the Last Year:    Transportation Needs:     Lack of Transportation (Medical):      Lack of Transportation (Non-Medical):    Physical Activity:     Days of Exercise per Week:     Minutes of Exercise per Session:    Stress:     Feeling of Stress :    Social Connections:     Frequency of Communication with Friends and Family:     Frequency of Social Gatherings with Friends and Family:     Attends Anglican Services:     Active Member of Clubs or Organizations:     Attends Club or Organization Meetings:     Marital Status:    Intimate Partner Violence:     Fear of Current or Ex-Partner:     Emotionally Abused:     Physically Abused:     Sexually Abused: ALLERGIES: Pcn [penicillins]    Review of Systems   Constitutional: Negative for fever. HENT: Negative for trouble swallowing. Eyes: Negative for visual disturbance. Respiratory: Negative for cough. Cardiovascular: Negative for chest pain. Gastrointestinal: Positive for abdominal pain. Genitourinary: Negative for difficulty urinating. Musculoskeletal: Negative for gait problem. Skin: Negative for rash. Neurological: Negative for headaches. Hematological: Does not bruise/bleed easily. Psychiatric/Behavioral: Negative for sleep disturbance. Vitals:    07/12/21 1028 07/12/21 1030   BP:  129/77   Pulse:  78   Resp:  16   Temp: 97.8 °F (36.6 °C)    SpO2:  100%   Weight:  75.3 kg (166 lb 0.1 oz)   Height:  5' 9\" (1.753 m)            Physical Exam  Constitutional:       Appearance: Normal appearance. HENT:      Head: Normocephalic. Nose: Nose normal.      Mouth/Throat:      Mouth: Mucous membranes are moist.   Eyes:      Extraocular Movements: Extraocular movements intact. Conjunctiva/sclera: Conjunctivae normal.   Cardiovascular:      Rate and Rhythm: Normal rate. Pulmonary:      Effort: Pulmonary effort is normal. No respiratory distress. Abdominal:      General: Abdomen is flat. Tenderness: There is abdominal tenderness in the left upper quadrant. Musculoskeletal:         General: Normal range of motion. Skin:     Findings: No rash. Neurological:      General: No focal deficit present. Mental Status: She is alert.    Psychiatric:         Behavior: Behavior normal.          MDM  Number of Diagnoses or Management Options  Abdominal pain, LUQ (left upper quadrant)  Choledocholithiasis  Diagnosis management comments: EKG at 1034  Normal sinus with normal axis at a rate of 74  VA, QRS, and QTc all within normal limits  No ST changes  Nonischemic  Some motion artifact limits interpretation, but no evidence of STEMI      Perfect Serve Consult for Admission  1:17 PM    ED Room Number: SER06/06  Patient Name and age:  Kellie Burton 60 y.o.  female  Working Diagnosis: Abdominal pain, LUQ (left upper quadrant)  (primary encounter diagnosis)  Choledocholithiasis    COVID-19 Suspicion:  no  Sepsis present:  no  Reassessment needed: no  Code Status:  Full Code  Readmission: no  Isolation Requirements:  no  Recommended Level of Care:  med/surg  Department:[facility] [place]  Other:    Pain is on the left, not the right, so that is unusual because the CT shows cholelithiasis and possibly choledocholithiasis. Thankfully, she has no fever or jaundice and labs are all relatively normal.  I spoke with Dr. Kendall Son, gastroenterology, who recommended MRCP with IV contrast.  [place] I will try to have the patient transferred to [facility] to be admitted and, depending on how long transportation takes, I may get the MRCP done here before she goes. If that would delay transport I will wait until she gets to [facility] to have the test done. He says he may still perform endoscopic ultrasonography given the location of her pain. Is possible the stones are red herring's and this could be peptic ulcer related.          Procedures

## 2021-07-12 NOTE — Clinical Note
Status[de-identified] INPATIENT [101]   Type of Bed: Surgical [18]   Inpatient Hospitalization Certified Necessary for the Following Reasons: 9.  Other (further clarification in H&P documentation)   Admitting Diagnosis: Abdominal pain [871340]   Admitting Physician: Jarrell Murphy [[id number]]   Attending Physician: Natalee Greenwood   Estimated Length of Stay: 3-4 Midnights   Discharge Plan[de-identified] Home with Office Follow-up

## 2021-07-13 LAB
ATRIAL RATE: 74 BPM
CALCULATED P AXIS, ECG09: 29 DEGREES
CALCULATED R AXIS, ECG10: 10 DEGREES
CALCULATED T AXIS, ECG11: -16 DEGREES
DIAGNOSIS, 93000: NORMAL
P-R INTERVAL, ECG05: 168 MS
Q-T INTERVAL, ECG07: 388 MS
QRS DURATION, ECG06: 74 MS
QTC CALCULATION (BEZET), ECG08: 430 MS
VENTRICULAR RATE, ECG03: 74 BPM

## 2021-07-21 ENCOUNTER — TRANSCRIBE ORDER (OUTPATIENT)
Dept: SCHEDULING | Age: 56
End: 2021-07-21

## 2021-07-21 DIAGNOSIS — Z12.31 VISIT FOR SCREENING MAMMOGRAM: Primary | ICD-10-CM

## 2021-08-12 ENCOUNTER — HOSPITAL ENCOUNTER (OUTPATIENT)
Dept: MAMMOGRAPHY | Age: 56
Discharge: HOME | End: 2021-08-12
Attending: INTERNAL MEDICINE
Payer: COMMERCIAL

## 2021-08-12 DIAGNOSIS — Z12.31 VISIT FOR SCREENING MAMMOGRAM: ICD-10-CM

## 2021-08-12 PROCEDURE — 77063 BREAST TOMOSYNTHESIS BI: CPT

## 2021-08-28 RX ORDER — FENOFIBRATE 145 MG/1
145 TABLET, COATED ORAL DAILY
Qty: 90 TABLET | Refills: 1 | Status: SHIPPED | OUTPATIENT
Start: 2021-08-28 | End: 2021-12-18

## 2022-03-19 PROBLEM — R73.03 PREDIABETES: Status: ACTIVE | Noted: 2017-01-16

## 2022-03-19 PROBLEM — E04.1 THYROID NODULE: Status: ACTIVE | Noted: 2017-01-23

## 2022-03-19 PROBLEM — R10.9 ABDOMINAL PAIN: Status: ACTIVE | Noted: 2021-07-12

## 2022-03-19 PROBLEM — E78.1 HYPERTRIGLYCERIDEMIA: Status: ACTIVE | Noted: 2017-05-18

## 2022-03-19 PROBLEM — K57.90 DIVERTICULOSIS: Status: ACTIVE | Noted: 2019-10-03

## 2022-03-19 PROBLEM — G57.93 NEUROPATHIC PAIN OF BOTH LEGS: Status: ACTIVE | Noted: 2017-09-28

## 2022-04-22 NOTE — ED NOTES
Patient states she would like to be transferred to [facility] and would like her  to drive POV. No

## 2022-06-20 ENCOUNTER — TRANSCRIBE ORDER (OUTPATIENT)
Dept: SCHEDULING | Age: 57
End: 2022-06-20

## 2022-06-20 DIAGNOSIS — Z12.31 SCREENING MAMMOGRAM FOR HIGH-RISK PATIENT: Primary | ICD-10-CM

## 2023-04-23 DIAGNOSIS — Z12.31 SCREENING MAMMOGRAM FOR HIGH-RISK PATIENT: Primary | ICD-10-CM

## 2024-04-18 NOTE — TELEPHONE ENCOUNTER
No protocol for requested medication.    Medication:    methylpheniDATE ER (Concerta) 36 MG CR tablet       Last office visit date: 03/18/2024  Next scheduled appointment?04/23/2024    Recommendations:[id number] - [place], [place] - [facility] [address] [facility] [address]     Pharmacy:      Order pended, routed to clinician for review.     Pt was seen on 11/30/17 for UTI, states she  given an antibiotic for 5 days. Pt states she finished up her antibiotic last Tuesday, she's not better, having some back pain. Pt states she thought she supposed to take a week of antibiotic not 5 days. She needs a return call as soon as possible to discuss the problem.

## 2024-06-19 NOTE — PROGRESS NOTES
HISTORY OF PRESENT ILLNESS  Aaliyah Wise is a 60 y.o. female. Patient reports increased urinary frequency, urgency, and dysuria worsening over the past few days. No fever, chills, or flank pain. No hematuria. Patient has history of recurrent UTIs. Patient has been trying increased hydration and felt a little better yesterday, but worse again today. Visit Vitals  /78 (BP 1 Location: Right arm, BP Patient Position: Sitting)   Pulse 73   Temp 96.9 °F (36.1 °C)   Resp 18   Ht 5' 8.75\" (1.746 m)   Wt 167 lb (75.8 kg)   SpO2 100%   BMI 24.84 kg/m²       HPI    Review of Systems   Genitourinary: Positive for dysuria, frequency and urgency. Physical Exam  Constitutional:       Appearance: Normal appearance. Abdominal:      Tenderness: There is no right CVA tenderness or left CVA tenderness. Skin:     General: Skin is warm and dry. Neurological:      General: No focal deficit present. Mental Status: She is alert and oriented to person, place, and time. Psychiatric:         Mood and Affect: Mood normal.         Behavior: Behavior normal.         ASSESSMENT and PLAN    ICD-10-CM ICD-9-CM    1. Burning with urination R30.0 788.1 AMB POC URINALYSIS DIP STICK AUTO W/O MICRO      CULTURE, URINE   2. Urinary frequency R35.0 788.41    3.  Abnormal urinalysis R82.90 791.9      Orders Placed This Encounter    CULTURE, URINE    AMB POC URINALYSIS DIP STICK AUTO W/O MICRO    phenazopyridine (PYRIDIUM) 200 mg tablet    nitrofurantoin, macrocrystal-monohydrate, (MACROBID) 100 mg capsule   will send urine culture- hold antibiotic and start if urine culture positive-script printed as patient is going on vacation next week  Start pyridium for symptom relief  Increase water intake Orthopaedic Surgery Progress Note    Subjective:  No acute events overnight. Pain well controlled. Denies chest pain, shortness of breath, or fevers.    Objective:  /84   Pulse 81   Temp 36.1 °C (97 °F)   Resp 16   SpO2 97%     Gen: arousable, NAD, appropriately conversational  Cardiac: RRR to peripheral palpation  Resp: nonlabored on RA  GI: soft, nondistended    MSK:  LUE:   - skin intact, no deformity, mild swelling. Tender at site of injury with painful ROM.  -Motor intact in axillary/AIN/PIN/ulnar distributions  -SILT axillary/radial/median/ulnar distributions  -Hand wwp, 2+ radial pulse, cap refill brisk  -Compartments soft and compressible, no pain with passive stretch of digits      Results for orders placed or performed during the hospital encounter of 06/18/24 (from the past 24 hour(s))   Transthoracic Echo (TTE) Complete   Result Value Ref Range    AV pk adriel 1.82 m/s    LVOT diam 2.00 cm    LV Biplane EF 64 %    MV avg E/e' ratio 11.84     MV E/A ratio 0.55     LA vol index A/L 27.0 ml/m2    Tricuspid annular plane systolic excursion 2.1 cm    RV free wall pk S' 11.70 cm/s    LVIDd 4.00 cm    RVSP 17.9 mmHg    Aortic Valve Area by Continuity of Peak Velocity 2.33 cm2    AV pk grad 13.2 mmHg    LV A4C EF 57.4        Transthoracic Echo (TTE) Complete   Final Result      FL less than 1 hour    (Results Pending)       Assessment/Plan: 61 y.o. female with L proximal humerus fracture. Pending operative fixation.    Plan:  - Admitted to orthopaedic surgery  - Consented and added to OR schedule for IMN L humerus w/ Dr. Patricia on 6/19  - Clearance for OR pending periop medicine consult; echo complete with EF 60-65%  - on CIWA for prior DTs  - WBAT: NWB LUE in sling  - Abx: periop   - DVT: SCDs, hold chemoppx for OR  - Diet: NPO at midnight for upcoming procedure  - FEN: LR @ 100cc/hr; HLIV with good PO intake  - Pain: Tylenol, oxycodone, dilaudid   - Bowel: colace, senna, miralax  - Continue home  meds    Dispo: pending OR    Juan Finch MD  Orthopedic Surgery PGY-1  Available by Epic Chat    While admitted, this patient will be followed by the Ortho Trauma Team, available via Epic Chat weekdays 6a-6p. Please page [phone] on nights and weekends.    Ortho Trauma  First Call: Juan Finch, PGY-1  Second Call: Darryl Summers, PGY-2  Third Call: Ernestine Womack, PGY-3

## (undated) DEVICE — (D)PREP SKN CHLRAPRP APPL 26ML -- CONVERT TO ITEM 371833

## (undated) DEVICE — SOLIDIFIER MEDC 1200ML -- CONVERT TO 356117

## (undated) DEVICE — TOWEL SURG W17XL27IN STD BLU COT NONFENESTRATED PREWASHED

## (undated) DEVICE — Device

## (undated) DEVICE — KENDALL RADIOLUCENT FOAM MONITORING ELECTRODE RECTANGULAR SHAPE: Brand: KENDALL

## (undated) DEVICE — CUFF ADULT 1 PC 1 VINYL DISP --

## (undated) DEVICE — PENCIL ES L3M BTTN SWCH S STL HEX LOK BLDE ELECTRD HOLSTER

## (undated) DEVICE — SYRINGE BLB 50CC IRRIG PLIABLE FNGR FLNG GRAD FLSK DISP

## (undated) DEVICE — SET 2ND L34IN N DEHP THE QUEENS MED CNTR VALUELINK

## (undated) DEVICE — KENDALL DL ECG CABLE AND LEAD WIRE SYSTEM, 3-LEAD, SINGLE PATIENT USE: Brand: KENDALL

## (undated) DEVICE — SYR 10ML LUER LOK 1/5ML GRAD --

## (undated) DEVICE — 1200 GUARD II KIT W/5MM TUBE W/O VAC TUBE: Brand: GUARDIAN

## (undated) DEVICE — CATHETER IV 20GA L1.25IN FEP STR HUB TEF INTROCAN SFTY

## (undated) DEVICE — BASIN EMSIS 16OZ GRAPHITE PLAS KID SHP MOLD GRAD FOR ORAL

## (undated) DEVICE — TIP SUCT BLU PLAS BLB W/O CTRL VENT YANK

## (undated) DEVICE — REM POLYHESIVE ADULT PATIENT RETURN ELECTRODE: Brand: VALLEYLAB

## (undated) DEVICE — NEEDLE HYPO 18GA L1.5IN PNK S STL HUB POLYPR SHLD REG BVL

## (undated) DEVICE — CATH IV AUTOGRD BC PNK 20GA 25 -- INSYTE

## (undated) DEVICE — SOLUTION LACTATED RINGERS INJECTION USP

## (undated) DEVICE — CONTINU-FLO SOLUTION SET, 2 INJECTION SITES, MALE LUER LOCK ADAPTER WITH RETRACTABLE COLLAR, LARGE BORE STOPCOCK WITH ROTATING MALE LUER LOCK EXTENSION SET, 2 INJECTION SITES, MALE LUER LOCK ADAPTER WITH RETRACTABLE COLLAR: Brand: INTERLINK/CONTINU-FLO

## (undated) DEVICE — SYR 3ML LL TIP 1/10ML GRAD --

## (undated) DEVICE — SUTURE ETHLN SZ 4-0 L18IN NONABSORBABLE BLK L19MM PS-2 3/8 1667H

## (undated) DEVICE — EXTREMITY III-LF: Brand: MEDLINE INDUSTRIES, INC.

## (undated) DEVICE — SET ADMIN 16ML TBNG L100IN 2 Y INJ SITE IV PIGGY BK DISP

## (undated) DEVICE — SUTURE VCRL SZ 3-0 L27IN ABSRB UD L26MM SH 1/2 CIR J416H

## (undated) DEVICE — CONTAINER,SPECIMEN,OR STERILE,4OZ: Brand: MEDLINE

## (undated) DEVICE — TOWEL 4 PLY TISS 19X30 SUE WHT

## (undated) DEVICE — 3M™ COBAN™ NL STERILE NON-LATEX SELF-ADHERENT WRAP, 2084S, 4 IN X 5 YD (10 CM X 4,5 M), 18 ROLLS/CASE: Brand: 3M™ COBAN™

## (undated) DEVICE — STERILE POLYISOPRENE POWDER-FREE SURGICAL GLOVES WITH EMOLLIENT COATING: Brand: PROTEXIS

## (undated) DEVICE — Z DISCONTINUED PER MEDLINE LINE GAS SAMPLING O2/CO2 LNG AD 13 FT NSL W/ TBNG FILTERLINE

## (undated) DEVICE — CURITY NON-ADHERENT STRIPS: Brand: CURITY

## (undated) DEVICE — 1010 S-DRAPE TOWEL DRAPE 10/BX: Brand: STERI-DRAPE™

## (undated) DEVICE — STERILE POLYISOPRENE POWDER-FREE SURGICAL GLOVES: Brand: PROTEXIS

## (undated) DEVICE — SOLUTION IV 1000ML 0.9% SOD CHL

## (undated) DEVICE — LIGHT HANDLE: Brand: DEVON

## (undated) DEVICE — KIT INFECTION CTRL ST FRAN --

## (undated) DEVICE — C-ARMOR C-ARM EQUIPMENT COVERS CLEAR STERILE UNIVERSAL FIT 12 PER CASE: Brand: C-ARMOR

## (undated) DEVICE — NEONATAL-ADULT SPO2 SENSOR: Brand: NELLCOR

## (undated) DEVICE — 3M™ TEGADERM™ TRANSPARENT FILM DRESSING FRAME STYLE, 1624W, 2-3/8 IN X 2-3/4 IN (6 CM X 7 CM), 100/CT 4CT/CASE: Brand: 3M™ TEGADERM™

## (undated) DEVICE — PADDING CAST 4 INX5 YD STRL